# Patient Record
Sex: MALE | Race: BLACK OR AFRICAN AMERICAN | NOT HISPANIC OR LATINO | Employment: UNEMPLOYED | URBAN - METROPOLITAN AREA
[De-identification: names, ages, dates, MRNs, and addresses within clinical notes are randomized per-mention and may not be internally consistent; named-entity substitution may affect disease eponyms.]

---

## 2021-03-30 ENCOUNTER — OFFICE VISIT (OUTPATIENT)
Dept: FAMILY MEDICINE CLINIC | Facility: CLINIC | Age: 66
End: 2021-03-30

## 2021-03-30 VITALS
TEMPERATURE: 97.7 F | SYSTOLIC BLOOD PRESSURE: 118 MMHG | RESPIRATION RATE: 18 BRPM | DIASTOLIC BLOOD PRESSURE: 80 MMHG | HEART RATE: 89 BPM | OXYGEN SATURATION: 99 % | WEIGHT: 126 LBS

## 2021-03-30 DIAGNOSIS — F41.9 ANXIETY: ICD-10-CM

## 2021-03-30 DIAGNOSIS — K29.50 CHRONIC GASTRITIS, PRESENCE OF BLEEDING UNSPECIFIED, UNSPECIFIED GASTRITIS TYPE: ICD-10-CM

## 2021-03-30 DIAGNOSIS — Z12.5 PROSTATE CANCER SCREENING: ICD-10-CM

## 2021-03-30 DIAGNOSIS — F11.11 HISTORY OF HEROIN ABUSE (HCC): ICD-10-CM

## 2021-03-30 DIAGNOSIS — E11.49 OTHER DIABETIC NEUROLOGICAL COMPLICATION ASSOCIATED WITH TYPE 2 DIABETES MELLITUS (HCC): ICD-10-CM

## 2021-03-30 DIAGNOSIS — E11.69 TYPE 2 DIABETES MELLITUS WITH OTHER SPECIFIED COMPLICATION, UNSPECIFIED WHETHER LONG TERM INSULIN USE (HCC): Primary | ICD-10-CM

## 2021-03-30 DIAGNOSIS — L60.0 INGROWN TOENAIL OF BOTH FEET: ICD-10-CM

## 2021-03-30 PROCEDURE — 99205 OFFICE O/P NEW HI 60 MIN: CPT | Performed by: NURSE PRACTITIONER

## 2021-03-30 RX ORDER — PRIMIDONE 50 MG/1
50 TABLET ORAL 4 TIMES DAILY
COMMUNITY
End: 2021-03-30

## 2021-03-30 RX ORDER — ESCITALOPRAM OXALATE 10 MG/1
10 TABLET ORAL DAILY
COMMUNITY
End: 2021-03-30

## 2021-03-30 RX ORDER — GABAPENTIN 100 MG/1
300 CAPSULE ORAL 3 TIMES DAILY
Qty: 90 CAPSULE | Refills: 2 | Status: SHIPPED | OUTPATIENT
Start: 2021-03-30 | End: 2021-04-15

## 2021-03-30 RX ORDER — FLASH GLUCOSE SENSOR
1 KIT MISCELLANEOUS CONTINUOUS
Qty: 1 EACH | Refills: 0 | Status: SHIPPED | OUTPATIENT
Start: 2021-03-30 | End: 2022-02-16 | Stop reason: SDUPTHER

## 2021-03-30 RX ORDER — GABAPENTIN 100 MG/1
100 CAPSULE ORAL 3 TIMES DAILY
COMMUNITY
End: 2021-03-30 | Stop reason: SDUPTHER

## 2021-03-30 RX ORDER — ONDANSETRON 4 MG/1
4 TABLET, ORALLY DISINTEGRATING ORAL EVERY 6 HOURS PRN
COMMUNITY

## 2021-03-30 RX ORDER — FLASH GLUCOSE SCANNING READER
1 EACH MISCELLANEOUS CONTINUOUS
Qty: 1 DEVICE | Refills: 0 | Status: SHIPPED | OUTPATIENT
Start: 2021-03-30 | End: 2022-02-16 | Stop reason: SDUPTHER

## 2021-03-30 RX ORDER — PANTOPRAZOLE SODIUM 40 MG/1
40 TABLET, DELAYED RELEASE ORAL DAILY
Qty: 60 TABLET | Refills: 0 | Status: SHIPPED | OUTPATIENT
Start: 2021-03-30 | End: 2021-11-16 | Stop reason: SDUPTHER

## 2021-03-30 NOTE — PATIENT INSTRUCTIONS
How to Stop Smoking   WHAT YOU NEED TO KNOW:   You will improve your health and the health of others around you if you stop smoking  Your risk for heart and lung disease, cancer, stroke, heart attack, and vision problems will also decrease  Your adolescent can help prevent or stop harm to his or her brain or body  This will help him or her become a healthy adult  You can benefit from quitting no matter how long you have smoked  DISCHARGE INSTRUCTIONS:   Prepare to stop smoking:  Nicotine is a highly addictive drug found in cigarettes  Withdrawal symptoms can happen when you stop smoking and make it hard to quit  These include anxiety, depression, irritability, trouble sleeping, and increased appetite  You increase your chances of success if you prepare to quit  · Set a quit date  Jason Rodriguezness a date that is within the next 2 weeks  Do not pick a day that you think may be stressful or busy  Write down the day or [place] it on your calender  · Tell friends and family that you plan to quit  Explain that you may have withdrawal symptoms when you try to quit  Ask them to support you  They may be able to encourage you and help reduce your stress to make it easier for you to quit  · Make a list of your reasons for quitting  Put the list somewhere you will see it every day, such as your refrigerator  You can look at the list when you have a craving  · Remove all tobacco and nicotine products from your home, car, and workplace  Also, remove anything else that will tempt you to smoke, such as lighters, matches, or ashtrays  Clean your car, home, and places at work that smell like smoke  The smell of smoke can trigger a craving  · Identify triggers that make you want to smoke  This may include activities, feelings, or people  Also write down 1 way you can deal with each of your triggers  For example, if you want to smoke as soon as you wake up, plan another activity during this time, such as exercise      · Make a plan for how you will quit  Learn about the tools that can help you quit, such as medicine, counseling, or nicotine replacement therapy  Choose at least 2 options to help you quit  · Help your adolescent make a plan to quit  The plan will be more successful if your adolescent makes his or her own decisions  Do not try to pressure him or her to quit immediately or in a certain way  Be supportive and offer help if needed  Tools to help you stop smoking:   · Counseling  from a trained healthcare provider can provide you with support and skills to quit smoking  The provider will also teach you to manage your withdrawal symptoms and cravings  You may receive counseling from one counselor, in group therapy, or through phone therapy called a quit line  · Nicotine replacement therapy (NRT)  such as nicotine patches, gum, or lozenges may help reduce your nicotine cravings  You may get these without a doctor's order  Do not use e-cigarettes or smokeless tobacco in place of cigarettes or to help you quit  They still contain nicotine  · Prescription medicines  such as nasal sprays or nicotine inhalers may help reduce your withdrawal symptoms  Other medicines may also be used to reduce your urge to smoke  Ask your healthcare provider about these medicines  You may need to start certain medicines 2 weeks before your quit date for them to work well  · Hypnosis  is a practice that helps guide you through thoughts and feelings  Hypnosis may help decrease your cravings and make you more willing to quit  · Acupuncture therapy  uses very thin needles to balance energy channels in the body  This is thought to help decrease cravings and symptoms of nicotine withdrawal     · Support groups  let you talk to others who are trying to quit or have already quit  It may be helpful to speak with others about how they quit  Manage your cravings:   · Avoid situations, people, and places that tempt you to smoke    Go to nonsmoking places, such as libraries or restaurants  Understand what tempts you and try to avoid these things  · Keep your hands busy  Hold things such as a stress ball or pen  · Put candy or toothpicks in your mouth  Keep lollipops, sugarless gum, or toothpicks with you at all times  · Do not have alcohol or caffeine  These drinks may tempt you to smoke  Drink healthy liquids such as water or juice instead  · Reward yourself when you resist your cravings  Rewards will motivate you and help you stay positive  · Do an activity that distracts you from your craving  Examples include cleaning, creating art, or gardening  Prevent weight gain after you quit:  You may gain a few pounds after you quit smoking  It is healthier for you to gain a few pounds than to continue to smoke  The following can help you prevent weight gain:  · Eat healthy foods  These include fruits, vegetables, whole-grain breads, low-fat dairy products, beans, lean meats, and fish  Eat healthy snacks, such as low-fat yogurt, if you get hungry between meals  · Drink water before, during, and between meals  This will make your stomach feel full and help prevent you from overeating  Ask your healthcare provider how much liquid to drink each day and which liquids are best for you  · Be physically active  Activity may help reduce your cravings and reduce stress  Take a walk or do some kind of physical activity every day  Ask your healthcare provider which activities are right for you  For support and more information:   · Smokefree  gov  Phone: 7- 279 - 726-2905  Web Address: www smokefrMetabacus  [address] Information is for End User's use only and may not be sold, redistributed or otherwise used for commercial purposes  All illustrations and images included in CareNotes® are the copyrighted property of [employer]  or [facility] Dedra Zuleta   The above information is an  only   It is not intended as medical advice for individual conditions or treatments  Talk to your doctor, nurse or pharmacist before following any medical regimen to see if it is safe and effective for you

## 2021-03-31 PROBLEM — F11.11 HISTORY OF HEROIN ABUSE (HCC): Status: ACTIVE | Noted: 2021-03-31

## 2021-03-31 PROBLEM — F41.9 ANXIETY: Status: ACTIVE | Noted: 2021-03-31

## 2021-03-31 PROBLEM — Z12.5 PROSTATE CANCER SCREENING: Status: ACTIVE | Noted: 2021-03-31

## 2021-03-31 PROBLEM — L60.0 INGROWN TOENAIL OF BOTH FEET: Status: ACTIVE | Noted: 2021-03-31

## 2021-03-31 PROBLEM — K29.50 CHRONIC GASTRITIS: Status: ACTIVE | Noted: 2021-03-31

## 2021-03-31 PROBLEM — E11.40 DIABETIC NEUROPATHY (HCC): Status: ACTIVE | Noted: 2021-03-31

## 2021-03-31 PROBLEM — E11.69 TYPE 2 DIABETES MELLITUS WITH OTHER SPECIFIED COMPLICATION (HCC): Status: ACTIVE | Noted: 2021-03-31

## 2021-03-31 NOTE — ASSESSMENT & PLAN NOTE
Newly diagnosed in [place] and started on oral meds and insulin  No A1C on file  Lab test sent  Long discussion regarding diet/exercise  Continue same regimen and will reassess based on A1C and daily BS log  Pt requesting Freestyle Angella order

## 2021-03-31 NOTE — ASSESSMENT & PLAN NOTE
Bloating & nausea w/ meals, chronic symptoms  Will treat empirically with PPI for 8 weeks then step down to H2      Pt declines GI referral at this time, will refer if symptoms persist

## 2021-03-31 NOTE — ASSESSMENT & PLAN NOTE
Pt and daughter expressly request referral to podiatrist   They are both hesitant to trim nails given his neuropathy and diabetes

## 2021-03-31 NOTE — PROGRESS NOTES
Assessment/Plan:    Problem List Items Addressed This Visit        Digestive    Chronic gastritis     Bloating & nausea w/ meals, chronic symptoms  Will treat empirically with PPI for 8 weeks then step down to H2  Pt declines GI referral at this time, will refer if symptoms persist          Relevant Medications    pantoprazole (PROTONIX) 40 mg tablet       Endocrine    [facility] mellitus with other specified [facility] ) - Primary     Newly diagnosed in [place] and started on oral meds and insulin  No A1C on file  Lab test sent  Long discussion regarding diet/exercise  Continue same regimen and will reassess based on A1C and daily BS log  Pt requesting Freestyle Angella order  Relevant Medications    metFORMIN (GLUCOPHAGE) 1000 MG tablet    insulin detemir (LEVEMIR) 100 units/mL subcutaneous injection    gabapentin (NEURONTIN) 100 mg capsule    Continuous Blood Gluc  (FreeStyle Angella 14 Day [place]) AYLA    Continuous Blood Gluc Sensor (FreeStyle Angella 14 Day Sensor) MISC    Other Relevant Orders    POCT hemoglobin A1c    CBC and differential    Comprehensive metabolic panel    Hemoglobin A1C    Lipid panel    TSH, 3rd generation with Free T4 reflex    Vitamin D 25 hydroxy    Diabetic neuropathy (HCC)     Uncontrolled  Will increase gabapentin to 300mg TID  Advised to keep in contact with office if symptoms don't improve as relapse to drug use is concern  Will f/u in 4 weeks  Relevant Medications    metFORMIN (GLUCOPHAGE) 1000 MG tablet    insulin detemir (LEVEMIR) 100 units/mL subcutaneous injection    gabapentin (NEURONTIN) 100 mg capsule       Musculoskeletal and Integument    Ingrown toenail of both feet     Pt and daughter expressly request referral to podiatrist   They are both hesitant to trim nails given his neuropathy and diabetes  Relevant Orders    Ambulatory referral to Podiatry       Other    History of heroin abuse ([facility] )     Not on suboxone    Pt stopped primidone  Denies night sweats, diarrhea, chest pain, SOB, headaches, or insomnia  He endorses dysphoria and anxiety  He stopped taking celexa  Declines pharmacotherapy  Advised and agrees to mental health therapy  Referral placed  Relevant Orders    Ambulatory referral to behavioral health therapists    Anxiety     Denies SI/HI  Emotional at times during history/exam   Declines pharmacotherapy, stopped taking celexa  Advised and accepts referral to behavioral health therapy  Will f/u in 4 weeks, advised to call if needed sooner  Relevant Orders    Ambulatory referral to behavioral health therapists    Prostate cancer screening    Relevant Orders    PSA, total and free            Return in about 4 weeks (around 4/27/2021) for Recheck depression, diabetes, acid reflux  I have spent 50 minutes with Patient and family today in which greater than 50% of this time was spent in counseling/coordination of care regarding Diagnostic results, Prognosis, Risks and benefits of tx options, Intructions for management, Patient and family education, Importance of tx compliance, Risk factor reductions, and Impressions as well as reviewing recent notes from specialist visits and relevant test and imaging results  Subjective:     HPI: Claudia Ferrera is a 60 y o  male who  has no past medical history on file  who presented to the office today   To establish care with PCP  He is accompanied by his daughter  Patient was in [place] for 6 weeks for cocaine and heroin withdrawal and rehabilitation  He has returned and living with his daughter  He is committed to taking care of his health and starting a new life free of illicit drug use  He is not on Suboxone  He was discharged with Celexa and primidone but no longer taking these 2 medications  He was also started on diabetic medications as he was newly diagnosed with type 2 diabetes      He discloses that due to his painful peripheral neuropathy he was self medicating with illegal drugs  He also states that anxiety and depression are continue challenges for him but he does not want to take medication for this  He also explains that he has GERD symptoms with bloating and nausea with meals  He was never treated for GERD or gastritis in the past   Daughter also requests a freestyle Angella monitor because it is difficult for the patient to check his blood sugar with the monitor that they have; he does not like to stick his finger with lancets  He is a tobacco smoker, is pre-contemplative and is considering medication but declines at this encounter  He also declines chest CT due to history of smoking and also declines referral to GI for routine colonoscopy  No other health concerns at this time  The following portions of the patient's history were reviewed and updated as appropriate: allergies, current medications, past family history, past medical history, past social history, past surgical history, and problem list     Current Outpatient Medications on File Prior to Visit   Medication Sig Dispense Refill    insulin detemir (LEVEMIR) 100 units/mL subcutaneous injection Inject under the skin      metFORMIN (GLUCOPHAGE) 1000 MG tablet Take 1,000 mg by mouth 2 (two) times a day with meals      ondansetron (ZOFRAN-ODT) 4 mg disintegrating tablet Take 4 mg by mouth every 6 (six) hours as needed for nausea or vomiting       No current facility-administered medications on file prior to visit  Review of Systems   Constitutional: Negative for chills and fever  HENT: Negative for ear pain and sore throat  Eyes: Negative for pain and visual disturbance  Respiratory: Negative for cough and shortness of breath  Cardiovascular: Negative for chest pain and palpitations  Gastrointestinal: Positive for abdominal distention, abdominal pain and nausea  Negative for vomiting  Gerd symptoms     Genitourinary: Negative for dysuria and hematuria  Musculoskeletal: Negative for arthralgias and back pain  Skin: Negative for color change and rash  Neurological: Negative for seizures and syncope  Psychiatric/Behavioral: Positive for agitation and dysphoric mood  Negative for self-injury and suicidal ideas  The patient is nervous/anxious  All other systems reviewed and are negative  Tobacco Cessation Counseling: Tobacco cessation counseling was provided  The patient is sincerely urged to quit consumption of tobacco  He is ready to quit tobacco  Medication options and side effects of medication discussed  Patient refused medication  Objective:    /80 (BP Location: Left arm, Patient Position: Sitting, Cuff Size: Standard)   Pulse 89   Temp 97 7 °F (36 5 °C) (Temporal)   Resp 18   Wt 57 2 kg (126 lb)   SpO2 99%     Physical Exam  Constitutional:       Appearance: Normal appearance  HENT:      Head: Normocephalic  Right Ear: Tympanic membrane, ear canal and external ear normal  There is no impacted cerumen  Left Ear: Tympanic membrane, ear canal and external ear normal  There is no impacted cerumen  Nose: Nose normal       Mouth/Throat:      Mouth: Mucous membranes are moist    Eyes:      Extraocular Movements: Extraocular movements intact  Pupils: Pupils are equal, round, and reactive to light  Neck:      Musculoskeletal: Normal range of motion  Cardiovascular:      Rate and Rhythm: Normal rate and regular rhythm  Pulses: Normal pulses  Heart sounds: Normal heart sounds  Pulmonary:      Effort: Pulmonary effort is normal       Breath sounds: Normal breath sounds  Abdominal:      General: Bowel sounds are normal       Palpations: Abdomen is soft  Musculoskeletal: Normal range of motion  General: No tenderness or signs of injury  Right lower leg: No edema  Left lower leg: No edema  Skin:     General: Skin is warm and dry        Capillary Refill: Capillary refill takes less than 2 seconds  Findings: No bruising, lesion or rash  Neurological:      General: No focal deficit present  Mental Status: He is alert and oriented to person, place, and time  Psychiatric:         Attention and Perception: Attention normal          Mood and Affect: Affect is tearful  Speech: Speech normal          Behavior: Behavior normal          Thought Content: Thought content normal          Cognition and Memory: Cognition normal          Judgment: Judgment normal          Kg AYLA Solis  03/31/21  10:43 AM    Patient Instructions     How to Stop Smoking   WHAT YOU NEED TO KNOW:   You will improve your health and the health of others around you if you stop smoking  Your risk for heart and lung disease, cancer, stroke, heart attack, and vision problems will also decrease  Your adolescent can help prevent or stop harm to his or her brain or body  This will help him or her become a healthy adult  You can benefit from quitting no matter how long you have smoked  DISCHARGE INSTRUCTIONS:   Prepare to stop smoking:  Nicotine is a highly addictive drug found in cigarettes  Withdrawal symptoms can happen when you stop smoking and make it hard to quit  These include anxiety, depression, irritability, trouble sleeping, and increased appetite  You increase your chances of success if you prepare to quit  · Set a quit date  Susy Padilla a date that is within the next 2 weeks  Do not pick a day that you think may be stressful or busy  Write down the day or [place] it on your calender  · Tell friends and family that you plan to quit  Explain that you may have withdrawal symptoms when you try to quit  Ask them to support you  They may be able to encourage you and help reduce your stress to make it easier for you to quit  · Make a list of your reasons for quitting  Put the list somewhere you will see it every day, such as your refrigerator   You can look at the list when you have a craving  · Remove all tobacco and nicotine products from your home, car, and workplace  Also, remove anything else that will tempt you to smoke, such as lighters, matches, or ashtrays  Clean your car, home, and places at work that smell like smoke  The smell of smoke can trigger a craving  · Identify triggers that make you want to smoke  This may include activities, feelings, or people  Also write down 1 way you can deal with each of your triggers  For example, if you want to smoke as soon as you wake up, plan another activity during this time, such as exercise  · Make a plan for how you will quit  Learn about the tools that can help you quit, such as medicine, counseling, or nicotine replacement therapy  Choose at least 2 options to help you quit  · Help your adolescent make a plan to quit  The plan will be more successful if your adolescent makes his or her own decisions  Do not try to pressure him or her to quit immediately or in a certain way  Be supportive and offer help if needed  Tools to help you stop smoking:   · Counseling  from a trained healthcare provider can provide you with support and skills to quit smoking  The provider will also teach you to manage your withdrawal symptoms and cravings  You may receive counseling from one counselor, in group therapy, or through phone therapy called a quit line  · Nicotine replacement therapy (NRT)  such as nicotine patches, gum, or lozenges may help reduce your nicotine cravings  You may get these without a doctor's order  Do not use e-cigarettes or smokeless tobacco in place of cigarettes or to help you quit  They still contain nicotine  · Prescription medicines  such as nasal sprays or nicotine inhalers may help reduce your withdrawal symptoms  Other medicines may also be used to reduce your urge to smoke  Ask your healthcare provider about these medicines   You may need to start certain medicines 2 weeks before your quit date for them to work well  · Hypnosis  is a practice that helps guide you through thoughts and feelings  Hypnosis may help decrease your cravings and make you more willing to quit  · Acupuncture therapy  uses very thin needles to balance energy channels in the body  This is thought to help decrease cravings and symptoms of nicotine withdrawal     · Support groups  let you talk to others who are trying to quit or have already quit  It may be helpful to speak with others about how they quit  Manage your cravings:   · Avoid situations, people, and places that tempt you to smoke  Go to nonsmoking places, such as libraries or restaurants  Understand what tempts you and try to avoid these things  · Keep your hands busy  Hold things such as a stress ball or pen  · Put candy or toothpicks in your mouth  Keep lollipops, sugarless gum, or toothpicks with you at all times  · Do not have alcohol or caffeine  These drinks may tempt you to smoke  Drink healthy liquids such as water or juice instead  · Reward yourself when you resist your cravings  Rewards will motivate you and help you stay positive  · Do an activity that distracts you from your craving  Examples include cleaning, creating art, or gardening  Prevent weight gain after you quit:  You may gain a few pounds after you quit smoking  It is healthier for you to gain a few pounds than to continue to smoke  The following can help you prevent weight gain:  · Eat healthy foods  These include fruits, vegetables, whole-grain breads, low-fat dairy products, beans, lean meats, and fish  Eat healthy snacks, such as low-fat yogurt, if you get hungry between meals  · Drink water before, during, and between meals  This will make your stomach feel full and help prevent you from overeating  Ask your healthcare provider how much liquid to drink each day and which liquids are best for you  · Be physically active    Activity may help reduce your cravings and reduce stress  Take a walk or do some kind of physical activity every day  Ask your healthcare provider which activities are right for you  For support and more information:   · Smokefree  gov  Phone: 5- 635 - 371-6254  Web Address: perez smokefree  [place] Information is for End User's use only and may not be sold, redistributed or otherwise used for commercial purposes  All illustrations and images included in CareNotes® are the copyrighted property of [employer]  or [address] [place], [place] [address]seng Zuleta   The above information is an  only  It is not intended as medical advice for individual conditions or treatments  Talk to your doctor, nurse or pharmacist before following any medical regimen to see if it is safe and effective for you

## 2021-03-31 NOTE — ASSESSMENT & PLAN NOTE
Not on suboxone  Pt stopped primidone  Denies night sweats, diarrhea, chest pain, SOB, headaches, or insomnia  He endorses dysphoria and anxiety  He stopped taking celexa  Declines pharmacotherapy  Advised and agrees to mental health therapy  Referral placed

## 2021-03-31 NOTE — ASSESSMENT & PLAN NOTE
Uncontrolled  Will increase gabapentin to 300mg TID  Advised to keep in contact with office if symptoms don't improve as relapse to drug use is concern  Will f/u in 4 weeks

## 2021-03-31 NOTE — ASSESSMENT & PLAN NOTE
Denies SI/HI  Emotional at times during history/exam   Declines pharmacotherapy, stopped taking celexa  Advised and accepts referral to behavioral health therapy  Will f/u in 4 weeks, advised to call if needed sooner

## 2021-04-03 ENCOUNTER — APPOINTMENT (OUTPATIENT)
Dept: LAB | Facility: HOSPITAL | Age: 66
End: 2021-04-03
Payer: COMMERCIAL

## 2021-04-03 DIAGNOSIS — E11.69 TYPE 2 DIABETES MELLITUS WITH OTHER SPECIFIED COMPLICATION, UNSPECIFIED WHETHER LONG TERM INSULIN USE (HCC): ICD-10-CM

## 2021-04-03 DIAGNOSIS — Z12.5 PROSTATE CANCER SCREENING: ICD-10-CM

## 2021-04-03 LAB
25(OH)D3 SERPL-MCNC: 22.9 NG/ML (ref 30–100)
ALBUMIN SERPL BCP-MCNC: 3.8 G/DL (ref 3.5–5)
ALP SERPL-CCNC: 85 U/L (ref 46–116)
ALT SERPL W P-5'-P-CCNC: 33 U/L (ref 12–78)
ANION GAP SERPL CALCULATED.3IONS-SCNC: 9 MMOL/L (ref 4–13)
AST SERPL W P-5'-P-CCNC: 12 U/L (ref 5–45)
BASOPHILS # BLD AUTO: 0.02 THOUSANDS/ΜL (ref 0–0.1)
BASOPHILS NFR BLD AUTO: 0 % (ref 0–1)
BILIRUB SERPL-MCNC: 0.42 MG/DL (ref 0.2–1)
BUN SERPL-MCNC: 12 MG/DL (ref 5–25)
CALCIUM SERPL-MCNC: 9.1 MG/DL (ref 8.3–10.1)
CHLORIDE SERPL-SCNC: 103 MMOL/L (ref 100–108)
CHOLEST SERPL-MCNC: 207 MG/DL (ref 50–200)
CO2 SERPL-SCNC: 28 MMOL/L (ref 21–32)
CREAT SERPL-MCNC: 0.76 MG/DL (ref 0.6–1.3)
EOSINOPHIL # BLD AUTO: 0.15 THOUSAND/ΜL (ref 0–0.61)
EOSINOPHIL NFR BLD AUTO: 2 % (ref 0–6)
ERYTHROCYTE [DISTWIDTH] IN BLOOD BY AUTOMATED COUNT: 12.7 % (ref 11.6–15.1)
EST. AVERAGE GLUCOSE BLD GHB EST-MCNC: 200 MG/DL
GFR SERPL CREATININE-BSD FRML MDRD: 111 ML/MIN/1.73SQ M
GLUCOSE P FAST SERPL-MCNC: 189 MG/DL (ref 65–99)
HBA1C MFR BLD: 8.6 %
HCT VFR BLD AUTO: 41.6 % (ref 36.5–49.3)
HDLC SERPL-MCNC: 62 MG/DL
HGB BLD-MCNC: 14.1 G/DL (ref 12–17)
IMM GRANULOCYTES # BLD AUTO: 0.03 THOUSAND/UL (ref 0–0.2)
IMM GRANULOCYTES NFR BLD AUTO: 0 % (ref 0–2)
LDLC SERPL CALC-MCNC: 124 MG/DL (ref 0–100)
LYMPHOCYTES # BLD AUTO: 1.48 THOUSANDS/ΜL (ref 0.6–4.47)
LYMPHOCYTES NFR BLD AUTO: 20 % (ref 14–44)
MCH RBC QN AUTO: 30 PG (ref 26.8–34.3)
MCHC RBC AUTO-ENTMCNC: 33.9 G/DL (ref 31.4–37.4)
MCV RBC AUTO: 89 FL (ref 82–98)
MONOCYTES # BLD AUTO: 0.49 THOUSAND/ΜL (ref 0.17–1.22)
MONOCYTES NFR BLD AUTO: 7 % (ref 4–12)
NEUTROPHILS # BLD AUTO: 5.21 THOUSANDS/ΜL (ref 1.85–7.62)
NEUTS SEG NFR BLD AUTO: 71 % (ref 43–75)
NONHDLC SERPL-MCNC: 145 MG/DL
NRBC BLD AUTO-RTO: 0 /100 WBCS
PLATELET # BLD AUTO: 172 THOUSANDS/UL (ref 149–390)
PMV BLD AUTO: 11.4 FL (ref 8.9–12.7)
POTASSIUM SERPL-SCNC: 3.5 MMOL/L (ref 3.5–5.3)
PROT SERPL-MCNC: 6.9 G/DL (ref 6.4–8.2)
RBC # BLD AUTO: 4.7 MILLION/UL (ref 3.88–5.62)
SODIUM SERPL-SCNC: 140 MMOL/L (ref 136–145)
TRIGL SERPL-MCNC: 106 MG/DL
TSH SERPL DL<=0.05 MIU/L-ACNC: 0.47 UIU/ML (ref 0.36–3.74)
WBC # BLD AUTO: 7.38 THOUSAND/UL (ref 4.31–10.16)

## 2021-04-03 PROCEDURE — 83036 HEMOGLOBIN GLYCOSYLATED A1C: CPT

## 2021-04-03 PROCEDURE — 82306 VITAMIN D 25 HYDROXY: CPT

## 2021-04-03 PROCEDURE — 80061 LIPID PANEL: CPT

## 2021-04-03 PROCEDURE — 3052F HG A1C>EQUAL 8.0%<EQUAL 9.0%: CPT | Performed by: FAMILY MEDICINE

## 2021-04-03 PROCEDURE — 80053 COMPREHEN METABOLIC PANEL: CPT

## 2021-04-03 PROCEDURE — 84153 ASSAY OF PSA TOTAL: CPT

## 2021-04-03 PROCEDURE — 84154 ASSAY OF PSA FREE: CPT

## 2021-04-03 PROCEDURE — 84443 ASSAY THYROID STIM HORMONE: CPT

## 2021-04-03 PROCEDURE — 36415 COLL VENOUS BLD VENIPUNCTURE: CPT

## 2021-04-03 PROCEDURE — 85025 COMPLETE CBC W/AUTO DIFF WBC: CPT

## 2021-04-05 ENCOUNTER — OFFICE VISIT (OUTPATIENT)
Dept: FAMILY MEDICINE CLINIC | Facility: CLINIC | Age: 66
End: 2021-04-05

## 2021-04-05 VITALS
OXYGEN SATURATION: 97 % | TEMPERATURE: 97.6 F | HEART RATE: 95 BPM | DIASTOLIC BLOOD PRESSURE: 86 MMHG | SYSTOLIC BLOOD PRESSURE: 136 MMHG | BODY MASS INDEX: 25.72 KG/M2 | HEIGHT: 60 IN | WEIGHT: 131 LBS | RESPIRATION RATE: 18 BRPM

## 2021-04-05 DIAGNOSIS — B35.1 ONYCHOMYCOSIS: ICD-10-CM

## 2021-04-05 DIAGNOSIS — E11.69 TYPE 2 DIABETES MELLITUS WITH OTHER SPECIFIED COMPLICATION, UNSPECIFIED WHETHER LONG TERM INSULIN USE (HCC): Primary | ICD-10-CM

## 2021-04-05 DIAGNOSIS — E55.9 VITAMIN D DEFICIENCY: Primary | ICD-10-CM

## 2021-04-05 PROCEDURE — 11721 DEBRIDE NAIL 6 OR MORE: CPT | Performed by: PODIATRIST

## 2021-04-05 PROCEDURE — 99212 OFFICE O/P EST SF 10 MIN: CPT | Performed by: PODIATRIST

## 2021-04-05 RX ORDER — MELATONIN
1000 DAILY
Qty: 30 TABLET | Refills: 2 | Status: SHIPPED | OUTPATIENT
Start: 2021-04-05 | End: 2021-07-16

## 2021-04-05 NOTE — PROGRESS NOTES
At [address] [facility], [facility] 60 y o  male MRN: [id number]  Encounter: [id number]      Assessment/Plan        Diagnoses and all orders for this visit:    Type 2 diabetes mellitus with other specified complication, unspecified whether long term insulin use ([facility] [address] )    Onychomycosis  -     Ambulatory referral to Podiatry       Plan:   Patient was seen/examined  All questions and concerns addressed   Nails x10 were sharply trimmed to normal length and thickness with a large nail nipper without incident (CPT 83167)   Educated patient on proper diabetic foot care; checking feet every day, wearing white socks, always wearing diabetic shoes even in house, tight glycemic control, proper low-sugar diet and exercise   RTC in 6 month(s)    Dr Hudson Hackett was present during this entire procedure  History of Present Illness     HPI:  Skylar Slater is a 60 y o  male who presents with elongated toenails  States that their nails are painful, elongated  They have difficulty applying their socks and shoes  The pressure within their shoe gear is painful and they have been unable to cut their nails adequately  Patient reports numbness/tingling  They state their last HbA1c was 8 6%  The patient denies any nausea, vomiting, fever, chills, shortness of breath, or chest pains  Review of Systems   Constitutional: Negative  HENT: Negative  Eyes: Negative  Respiratory: Negative  Cardiovascular: Negative  Gastrointestinal: Negative  Musculoskeletal: Negative   Skin: elongated thickened toenails  Neurological: Negative  Historical Information   No past medical history on file  No past surgical history on file    Social History   Social History     Substance and Sexual Activity   Alcohol Use Not Currently     Social History     Substance and Sexual Activity   Drug Use Never     Social History     Tobacco Use   Smoking Status Current Every Day Smoker   Smokeless Tobacco Never Used     Family History: No family history on file  Meds/Allergies   (Not in a hospital admission)    No Known Allergies    Objective     Current Vitals:   Blood Pressure: 136/86 (04/05/21 0924)  Pulse: 95 (04/05/21 0924)  Temperature: 97 6 °F (36 4 °C) (04/05/21 0924)  Respirations: 18 (04/05/21 0924)  Height: 4' 11" (149 9 cm) (04/05/21 0924)  Weight - Scale: 59 4 kg (131 lb) (04/05/21 0924)  SpO2: 97 % (04/05/21 0924)        /86 (BP Location: Left arm, Patient Position: Sitting, Cuff Size: Standard)   Pulse 95   Temp 97 6 °F (36 4 °C)   Resp 18   Ht 4' 11" (1 499 m)   Wt 59 4 kg (131 lb)   SpO2 97%   BMI 26 46 kg/m²       Lower Extremity Exam:    Vascular: Right foot DP/PT +2                   Left foot DP/PT +2                   There is no lower extremity edema bilateral     Musculoskeletal: There is 5/5 strength throughout the bilateral lower extremity  full ankle range of motion with well maintained subtalar range of motion  There is no tenderness over the foot and ankle  There is not foot deformities    Neurological: Sensation to 5 07 Mulberry-Josef nylon filament: positive bilaterally      Vibratory sense to distal Foot  positive bilaterally      Sharp/Dull sense is positive bilaterally      Dermatology: Skin Condition:  normal     There is not evidence of macerated tissue between toe spaces  Nail Exam: onychomycosis of the toenails       Open ulcerations: No     Calluses: No    Risk Category: 0 = No loss of protective sensation

## 2021-04-06 DIAGNOSIS — E11.69 TYPE 2 DIABETES MELLITUS WITH OTHER SPECIFIED COMPLICATION, UNSPECIFIED WHETHER LONG TERM INSULIN USE (HCC): Primary | ICD-10-CM

## 2021-04-06 LAB
PSA FREE MFR SERPL: 37.1 %
PSA FREE SERPL-MCNC: 0.26 NG/ML
PSA SERPL-MCNC: 0.7 NG/ML (ref 0–4)

## 2021-04-15 ENCOUNTER — OFFICE VISIT (OUTPATIENT)
Dept: FAMILY MEDICINE CLINIC | Facility: CLINIC | Age: 66
End: 2021-04-15

## 2021-04-15 VITALS
RESPIRATION RATE: 18 BRPM | TEMPERATURE: 98 F | SYSTOLIC BLOOD PRESSURE: 138 MMHG | OXYGEN SATURATION: 98 % | DIASTOLIC BLOOD PRESSURE: 86 MMHG | WEIGHT: 128.3 LBS | HEIGHT: 60 IN | BODY MASS INDEX: 25.19 KG/M2 | HEART RATE: 96 BPM

## 2021-04-15 DIAGNOSIS — E11.69 TYPE 2 DIABETES MELLITUS WITH OTHER SPECIFIED COMPLICATION, UNSPECIFIED WHETHER LONG TERM INSULIN USE (HCC): Primary | ICD-10-CM

## 2021-04-15 DIAGNOSIS — E11.49 OTHER DIABETIC NEUROLOGICAL COMPLICATION ASSOCIATED WITH TYPE 2 DIABETES MELLITUS (HCC): ICD-10-CM

## 2021-04-15 PROCEDURE — 99213 OFFICE O/P EST LOW 20 MIN: CPT | Performed by: FAMILY MEDICINE

## 2021-04-15 PROCEDURE — 4004F PT TOBACCO SCREEN RCVD TLK: CPT | Performed by: FAMILY MEDICINE

## 2021-04-15 PROCEDURE — 3008F BODY MASS INDEX DOCD: CPT | Performed by: FAMILY MEDICINE

## 2021-04-15 RX ORDER — GABAPENTIN 300 MG/1
600 CAPSULE ORAL 3 TIMES DAILY
Qty: 180 CAPSULE | Refills: 2 | Status: SHIPPED | OUTPATIENT
Start: 2021-04-15 | End: 2021-07-19

## 2021-04-16 ENCOUNTER — SOCIAL WORK (OUTPATIENT)
Dept: BEHAVIORAL/MENTAL HEALTH CLINIC | Facility: CLINIC | Age: 66
End: 2021-04-16

## 2021-04-16 DIAGNOSIS — F32.1 CURRENT MODERATE EPISODE OF MAJOR DEPRESSIVE DISORDER WITHOUT PRIOR EPISODE (HCC): Primary | ICD-10-CM

## 2021-04-16 NOTE — PROGRESS NOTES
A/P:  1  Diabetic peripheral neuropathy: Increase gabapentin to 600 mg po TID  If no relief, consider TCA versus duloxetine versus venlafaxine  F/U with Felt Blue, CRNP in 4-6 weeks  HPI:  60year old man diagnosed with diabetic polyneuropathy in b/l legs  Started on gabapentin 300 mg po TID  States that the gabapentin helps the pain, but his symptoms return after a few hours  In particular, the symptoms keep him awake at night  Last A1C 8 6% (goal <7%)  On metformin, determir      ROS per HPI      Current Outpatient Medications:     cholecalciferol (VITAMIN D3) 1,000 units tablet, Take 1 tablet (1,000 Units total) by mouth daily, Disp: 30 tablet, Rfl: 2    gabapentin (NEURONTIN) 300 mg capsule, Take 2 capsules (600 mg total) by mouth 3 (three) times a day, Disp: 180 capsule, Rfl: 2    insulin detemir (LEVEMIR) 100 units/mL subcutaneous injection, Inject under the skin, Disp: , Rfl:     metFORMIN (GLUCOPHAGE) 1000 MG tablet, Take 1 tablet (1,000 mg total) by mouth 2 (two) times a day with meals, Disp: 180 tablet, Rfl: 0    ondansetron (ZOFRAN-ODT) 4 mg disintegrating tablet, Take 4 mg by mouth every 6 (six) hours as needed for nausea or vomiting, Disp: , Rfl:     pantoprazole (PROTONIX) 40 mg tablet, Take 1 tablet (40 mg total) by mouth daily, Disp: 60 tablet, Rfl: 0    Continuous Blood Gluc  (FreeStyle Angella 14 Day Nowata) AYLA, Use 1 Device continuous (Patient not taking: Reported on 4/15/2021), Disp: 1 Device, Rfl: 0    Continuous Blood Gluc Sensor (FreeStyle Angella 14 Day Sensor) MISC, Use 1 Device continuous (Patient not taking: Reported on 4/15/2021), Disp: 1 each, Rfl: 0    Vitals:    04/15/21 1130   BP: 138/86   Pulse: 96   Resp: 18   Temp: 98 °F (36 7 °C)   SpO2: 98%     GNRL: WN, WD male in mild distress  EXT: Decreased sensation b/l LE, 1+ b/l pulses, no obvious wounds or deformities of feet
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
## 2021-04-16 NOTE — BH TREATMENT PLAN
[address] [facility], [facility]  1955  Date of Treatment Plan:4/16/21    Treatment Goals (after each item selected, indicate outcome measures; (ie: as evidenced by)    [x] Reduce Risk Factors of: relapse   [x] Reduce Major Symptoms of: Depression and addiction   [x] Ameliorate Functional Impairments of:    [x] Develop Coping Strategies to Address Stress of: past and present addictive behaviors   [] Stabilize (short term) Crisis of:   [x] Maintain (long term) Stabilization of Symptoms of: addiction and depression   [] Medication Referral to:   [x] Maintain Sobriety: from drugs and alcohol     Planned Interventions- Patient Participation (must be consistent with treatment goals):    [] Assertiveness Training [x] Problem Solving Skills Training   [] Anger Management [x] Solution Focused Techniques   [] Affect Identification and Expression [x] Stress Management   [] Cognitive Restructuring [] Supportive Therapy   [] Communication Training  [] Self/Other Calloway Training    [x] Grief Work  [x] Decision Options Exploration   [] Imagery/Relaxation Training [] Decision Option Exploration   [] Parent Training  [x] Pattern Identification and Interruption       [x] Engage Significant Others in Treatment: to involve [facility] dtr Leesa Linares    [x] Facilitate Decision Making Regarding:long term abstinance    [] Explore/Monitor:    [] Teach Skills of:    [] Educate Regarding:   [] Assign Readings:   [] Referrals Planned:   [x] Use of Resources/Strengths:community resources and past work experience   [x] Preventative Strategies: relapse prevention   [x] Obstacles to Change: psychosocial stressors, lack of motivation and negative environment etc     Estimated Time Frames:     Goal 1: Develop Coping Strategies to Address Stress of: past and present addictive behaviors   Goal 2: Maintain (long term) Stabilization of Symptoms of: addiction and depression    I have been provided education on my primary diagnosis of: 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

## 2021-04-19 ENCOUNTER — TELEPHONE (OUTPATIENT)
Dept: FAMILY MEDICINE CLINIC | Facility: CLINIC | Age: 66
End: 2021-04-19

## 2021-04-19 NOTE — PATIENT INSTRUCTIONS
Help Prevent Suicide   WHAT YOU NEED TO KNOW:   A person may see suicide as the only way to escape emotional or physical pain and suffering  You can help provide emotional support for him or her and get the help he or she needs  Learn to recognize warning signs that the person may be considering suicide  Resources are available to help you and the person  DISCHARGE INSTRUCTIONS:   Call the person's local emergency number (911 in the [address] [place] [address],[address]) if:   · The person has done something on purpose to hurt himself or herself  · The person tries to commit suicide  · The person tells you he or she made a plan to commit suicide  Call the person's doctor or therapist if:   · The person acts out in anger, is reckless, or is abusing alcohol or drugs  · The person has serious thoughts of suicide, even after treatment  · You begin to see warning signs that the person may be considering suicide  · The person has intense feelings of sadness, anger, revenge, or despair  · The person withdraws from others  · The person tells you he or she has more thoughts of suicide when alone  · The person stops eating, or begins to smoke or drink heavily  · The person says he or she is a burden because of a disability or disease  · You have questions or concerns about the person's condition or care  What to do if the person is having thoughts of suicide:  Call the person's local emergency number [phone] in the [address] [place] [address],[address]) if you feel he or she is at immediate risk of suicide  Also call if he or she talks about an active suicide plan  Assume that the person intends to carry out his or her plan  The following are some things you can do:  · Contact a suicide prevention organization:      ? National Suicide Prevention Lifeline: 2-708.258.5007 (3-614-615-CNTK)     ? Suicide Hotline: 2-824.197.7585 (7-568-PSOWYIM)     ?  For a list of international numbers: https://save org/find-help/international-resources/    · Contact the person's therapist  His or her healthcare provider can give you a list of therapists if he or she does not have one  · Keep medicines, weapons, and alcohol out of the person's reach  · Do not leave the person alone if he or she says he or she wants to commit suicide  Do not leave the person alone if you think he or she may try it  Make sure you do not put yourself at risk if the person has a weapon  · Ask the person if he or she is thinking of committing suicide and has a plan  Warning signs to watch for:   · Talking about a plan for committing suicide, or suddenly deciding to make a will    · Cutting himself or herself, burning the skin with cigarettes, or driving recklessly    · Drug or alcohol use, not taking prescribed medicine, or taking too much prescribed medicine    · Sudden anger, lashing out at others, or seeming hopeless, anxious, or angry and then suddenly becoming happy or peaceful    · Not wanting to spend time with others or doing things he or she usually enjoys    · Trouble at work, or not showing up for work    · A change in the way he or she eats, sleeps, or dresses    · Weight gain or loss or having less energy than usual    · Trouble sleeping or spending a lot of time sleeping    · Giving away or throwing away his or her belongings    · Suddenly not going to therapy    Follow up with the person's doctor or therapist as directed:  Write down your questions so you remember to ask them during your visits  Treatments the person may need:   · Medicines  may be given to prevent mood swings, or to decrease anxiety or depression  The person will need to take all medicines as directed  A sudden stop can be harmful  It may take 4 to 6 weeks for the medicine to help him or her feel better  · A therapist  can help the person identify and change negative feelings or beliefs about himself or herself  This may also help change the way the he or she feels and acts   A therapist can also help the person find ways to cope with things that cannot be changed  What you can do to help the person:   · Encourage the person to seek help for drug or alcohol abuse  Drugs and alcohol can increase suicidal thoughts and make the person more likely to act on them  · Help the person connect with others  Encourage him or her to become involved in the community  Some examples include tutoring a young student, volunteering at a [employer], or joining a group exercise program     · Exercise with the person  Exercise can lift his or her mood, increase energy, and make it easier to sleep at night  · Encourage the person to try new things  Adults who are open to new experiences handle stress and change better than those who are not  · Call, visit, or send postcards to the person often  Check on him or her after the loss of a pet, longtime friend, or child  Holidays, birthdays, and anniversaries can be difficult for a person after a loss  The loss of a spouse can be especially painful and lonely  · Help the person schedule a visit with his or her Scientologist or spiritual leader  A Scientologist or spiritual leader may be able to offer additional support and resources to the person  · Encourage the person to continue taking medicine and going to therapy  Medicine and therapy can help improve his or her mental health  For support and more information:   · [address] [place] [address]  Phone: 2- 725 - 709-TBFF (01 33 [phone] 04 02)  Web Address: [employer]    · Suicide Awareness Voices of Education  [address] [place] [address] Tony Gurrola [address] , [address]  Phone: 3- 666 - 560-2344  Web Address: [employer] [facility] [place] [address] Information is for End User's use only and may not be sold, redistributed or otherwise used for commercial purposes   All illustrations and images included in CareNotes® are the copyrighted property of [employer]  or [address]  The above information is an  only  It is not intended as medical advice for individual conditions or treatments  Talk to your doctor, nurse or pharmacist before following any medical regimen to see if it is safe and effective for you

## 2021-04-19 NOTE — TELEPHONE ENCOUNTER
----- Message from Barbara Greenberg sent at 4/15/2021 12:30 PM EDT -----  Regarding: Laurie Gordon, when scheduling patients for Ray  If the patient is seeing Oza Klinefelter for the 1st time be sure to schedule the patient as a new patient appt  This will come to me in my workq as a new patient and will trigger me to set up the B/H guarantor on the patients acct  Thanks

## 2021-04-21 ENCOUNTER — TELEPHONE (OUTPATIENT)
Dept: PSYCHIATRY | Facility: CLINIC | Age: 66
End: 2021-04-21

## 2021-05-05 ENCOUNTER — IMMUNIZATIONS (OUTPATIENT)
Dept: FAMILY MEDICINE CLINIC | Facility: HOSPITAL | Age: 66
End: 2021-05-05

## 2021-05-05 DIAGNOSIS — Z23 ENCOUNTER FOR IMMUNIZATION: Primary | ICD-10-CM

## 2021-05-05 PROCEDURE — 91301 SARS-COV-2 / COVID-19 MRNA VACCINE (MODERNA) 100 MCG: CPT

## 2021-05-05 PROCEDURE — 0011A SARS-COV-2 / COVID-19 MRNA VACCINE (MODERNA) 100 MCG: CPT

## 2021-05-07 ENCOUNTER — TELEPHONE (OUTPATIENT)
Dept: FAMILY MEDICINE CLINIC | Facility: CLINIC | Age: 66
End: 2021-05-07

## 2021-05-07 NOTE — TELEPHONE ENCOUNTER
This therapist contacted Esthela Santos and confirmed the virtual appt on 5/14 at 11am  His email id is:    [email or web address]  [email or web address]

## 2021-05-14 ENCOUNTER — TELEMEDICINE (OUTPATIENT)
Dept: BEHAVIORAL/MENTAL HEALTH CLINIC | Facility: CLINIC | Age: 66
End: 2021-05-14

## 2021-05-14 DIAGNOSIS — F41.1 GENERALIZED ANXIETY DISORDER: Primary | ICD-10-CM

## 2021-05-14 PROCEDURE — 90832 PSYTX W PT 30 MINUTES: CPT | Performed by: SOCIAL WORKER

## 2021-05-14 NOTE — PATIENT INSTRUCTIONS
Generalized Anxiety Disorder   WHAT YOU NEED TO KNOW:   General anxiety disorder (OMID) is a condition that causes you to worry more than normal  It also causes you to feel fear in most situations  You are worried or afraid even without a cause  You may worry about your health, job, money, and relationships  It is hard for you to control your worry and feel calm  OMID prevents you from doing daily activities  It may also prevent you from spending time with family and friends  Without treatment, your anxiety may get worse  DISCHARGE INSTRUCTIONS:   Call your local emergency number (911 in the [address]) for any of the following:   · You have chest pain, tightness, or heaviness that may spread to your shoulders, arms, jaw, neck, or back  · You feel like hurting yourself or someone else  Call your doctor if:   · Your symptoms get worse or do not get better with treatment  · You have new symptoms since your last visit  · You have questions or concerns about your condition or care  Medicines:   · Medicines  help you feel more calm and relaxed, and decrease your symptoms  · Take your medicine as directed  Contact your healthcare provider if you think your medicine is not helping or if you have side effects  Tell him of her if you are allergic to any medicine  Keep a list of the medicines, vitamins, and herbs you take  Include the amounts, and when and why you take them  Bring the list or the pill bottles to follow-up visits  Carry your medicine list with you in case of an emergency  Manage anxiety:   · Talk to someone about your anxiety  Your healthcare provider may suggest counseling  Cognitive behavioral therapy can help you understand and change how you react to events  It can also help you understand what triggers your symptoms  You might feel more comfortable talking with a friend or family member about your anxiety  Choose someone you know will be supportive and encouraging      · Keep a journal of your symptoms  Write down what you were doing before your symptoms started  Also write down what made the anxiety better or worse  Bring this journal with you to your follow-up appointments  · Find ways to relax  Activities such as yoga, meditation, or listening to music can help you relax  Spend time with friends, or do things you enjoy  · Practice deep breathing  Deep breathing can help you relax when you feel anxious  Focus on taking slow, deep breaths several times a day, or during an anxiety attack  Slowly breathe in through your nose  Pause, then slowly breathe out through your mouth  Try to breathe out longer than you breathed in  · Create a regular sleep routine  Regular sleep can help you feel calmer during the day  Go to sleep and wake up at the same times every day  Do not watch television or use the computer right before bed  Your room should be comfortable, dark, and quiet  · Eat a variety of healthy foods  Healthy foods include fruits, vegetables, low-fat dairy products, lean meats, fish, whole-grain breads, and cooked beans  Healthy foods can help you feel less anxious and have more energy  · Exercise regularly  Exercise can increase your energy level  Exercise may also lift your mood and help you sleep better  Your healthcare provider can help you create an exercise plan  · Do not smoke  Nicotine and other chemicals in cigarettes and cigars can increase anxiety  Ask your healthcare provider for information if you currently smoke and need help to quit  E-cigarettes or smokeless tobacco still contain nicotine  Talk to your healthcare provider before you use these products  · Do not have caffeine  Caffeine can make your symptoms worse  Do not have foods or drinks that are meant to increase your energy level  · Limit or do not drink alcohol  Ask your healthcare provider if alcohol is safe for you  Also ask how much is safe   You may not be able to drink alcohol if you take certain anxiety or depression medicines  · Do not use drugs  Drugs can make your anxiety worse  It can also make anxiety hard to manage  Talk to your healthcare provider if you use drugs and want help to quit  Follow up with your healthcare provider as directed:  Write down your questions so you remember to ask them during your visits  © Copyright [address] Information is for End User's use only and may not be sold, redistributed or otherwise used for commercial purposes  All illustrations and images included in CareNotes® are the copyrighted property of A D A M , Inc  or [facility] Dedra Zuleta   The above information is an  only  It is not intended as medical advice for individual conditions or treatments  Talk to your doctor, nurse or pharmacist before following any medical regimen to see if it is safe and effective for you

## 2021-05-14 NOTE — PSYCH
Assessment/Plan: Jeff Mayo presented in the session with an anxious mood and affect  He was able to ventilate his feelings appropriately  He was also able to pin point the reasons for his anxiety  He denied any depressive thoughts or feelings currently  There are no diagnoses linked to this encounter  Subjective: I have been feeling anxious and nervous lately  I am going for camping with my N/A friends  I am anxious and nervous because I may forget and leave stuff behind  I feel overwhelmed now and feel nervous constantly  I take gabapentin and the vitamins  I take the metformin for diabetes  My anxiety is the worse now  Patient ID: Jaden Ocasio is a 60 y o  male  HPI11:05 to 11:40- 35 min    Review of Systems      Objective: Jeff Mayo presented in the session with anxiety  He was having difficulty logging in  He seemed to be nervous and was feeling on the edge  He was unable to relax and wanted to cut short the visit  He was unable to acknowledge the importance of deep breathing  He was unable to practice deep breathing  He seemed to look for a quick solution or a pill to manage the anxiety         Physical Exam

## 2021-05-14 NOTE — BH TREATMENT PLAN
[address] [facility]  1955  Date of Treatment Plan:4/16/21    Treatment Goals (after each item selected, indicate outcome measures; (ie: as evidenced by)    [x] Reduce Risk Factors of: relapse   [x] Reduce Major Symptoms of: Depression and anxiety   [x] Ameliorate Functional Impairments of: lack of interest and lack of motivation   [x] Develop Coping Strategies to Address Stress of: life stressors   [x] Stabilize (short term) Crisis of: going out in the crowd away from the comfort zone   [x] Maintain (long term) Stabilization of Symptoms of: addiction and depression   [] Medication Referral to:   [x] Maintain Sobriety: from drugs and alcohol     Planned Interventions- Patient Participation (must be consistent with treatment goals):    [] Assertiveness Training [x] Problem Solving Skills Training   [] Anger Management [x] Solution Focused Techniques   [] Affect Identification and Expression [x] Stress Management   [] Cognitive Restructuring [x] Supportive Therapy   [] Communication Training  [x] Self/Other Power Training    [x] Grief Work  [x] Decision Options Exploration   [x] Imagery/Relaxation Training [] Decision Option Exploration   [] Parent Training  [x] Pattern Identification and Interruption       [x] Engage Significant Others in Treatment: to involve Sherwin's dtr Antoinette Torres    [x] Facilitate Decision Making Regarding:long term abstinance    [] Explore/Monitor:    [x] Teach Skills of: relaxation   [] Educate Regarding:   [] Assign Readings:   [] Referrals Planned:   [x] Use of Resources/Strengths:community resources and past work experience   [x] Preventative Strategies: relapse prevention   [x] Obstacles to Change: psychosocial stressors, lack of motivation and negative environment etc     Estimated Time Frames: 7/16/2021     Goal 1: Develop Coping Strategies to Address Stress of: past and present addictive behaviors   Goal 2: Maintain (long term) Stabilization of Symptoms of: addiction and depression    I have been provided education on my primary diagnosis of: 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

## 2021-05-21 ENCOUNTER — TELEMEDICINE (OUTPATIENT)
Dept: BEHAVIORAL/MENTAL HEALTH CLINIC | Facility: CLINIC | Age: 66
End: 2021-05-21

## 2021-05-21 DIAGNOSIS — F41.1 GENERALIZED ANXIETY DISORDER: Primary | ICD-10-CM

## 2021-05-21 PROCEDURE — 90834 PSYTX W PT 45 MINUTES: CPT | Performed by: SOCIAL WORKER

## 2021-05-21 NOTE — PSYCH
Assessment/Plan: Santosh Hudson presented in the session with an anxious mood and irritable affect          There are no diagnoses linked to this encounter  Subjective: I am in a situation and need help  I feel I don't have the control over my situation  My 10 yr old grand dtr has been disrespectful  She doesn't respects her brother who is 15  Her mom has lots leniency for her  I told my dtr that I cannot deal with the disrespectful behavior  I don't know what to do  My energy is little low  I had issue with weight loss  My normal weight was 135  Now my weight is 127  I am more anxious than depressed  I feel irritated and angry with my situation  I am anxious especially when I think about something that is coming up like my appointment  I was very much anxious and nervous before my camping trip  Patient ID: Deidra Holt is a 60 y o  male  HPI- 2:16 to 3:05- 51 min     Review of Systems      Objective:Sherwin was having difficulty joining the call today at 1pm  Therapist tried to help him but Santosh Hudson couldn't join  So he was asked to join later after 2:15  Santosh Hudson seems to have anxiety prior to an appointment or activity  He was helped to learn the problem solving technique and the relaxation skill  He was helped to learn the deep breathing exercise  He was encouraged to practice it 15 to 20 min daily  Santosh Hudson seems to have the attention/concentration problems  He acknowledged the need for the breathing exercise        Physical Exam

## 2021-05-21 NOTE — PATIENT INSTRUCTIONS
Generalized Anxiety Disorder   WHAT YOU NEED TO KNOW:   General anxiety disorder (OMID) is a condition that causes you to worry more than normal  It also causes you to feel fear in most situations  You are worried or afraid even without a cause  You may worry about your health, job, money, and relationships  It is hard for you to control your worry and feel calm  OMID prevents you from doing daily activities  It may also prevent you from spending time with family and friends  Without treatment, your anxiety may get worse  DISCHARGE INSTRUCTIONS:   Call your local emergency number (911 in the [address]) for any of the following:   · You have chest pain, tightness, or heaviness that may spread to your shoulders, arms, jaw, neck, or back  · You feel like hurting yourself or someone else  Call your doctor if:   · Your symptoms get worse or do not get better with treatment  · You have new symptoms since your last visit  · You have questions or concerns about your condition or care  Medicines:   · Medicines  help you feel more calm and relaxed, and decrease your symptoms  · Take your medicine as directed  Contact your healthcare provider if you think your medicine is not helping or if you have side effects  Tell him of her if you are allergic to any medicine  Keep a list of the medicines, vitamins, and herbs you take  Include the amounts, and when and why you take them  Bring the list or the pill bottles to follow-up visits  Carry your medicine list with you in case of an emergency  Manage anxiety:   · Talk to someone about your anxiety  Your healthcare provider may suggest counseling  Cognitive behavioral therapy can help you understand and change how you react to events  It can also help you understand what triggers your symptoms  You might feel more comfortable talking with a friend or family member about your anxiety  Choose someone you know will be supportive and encouraging      · Keep a journal of your symptoms  Write down what you were doing before your symptoms started  Also write down what made the anxiety better or worse  Bring this journal with you to your follow-up appointments  · Find ways to relax  Activities such as yoga, meditation, or listening to music can help you relax  Spend time with friends, or do things you enjoy  · Practice deep breathing  Deep breathing can help you relax when you feel anxious  Focus on taking slow, deep breaths several times a day, or during an anxiety attack  Slowly breathe in through your nose  Pause, then slowly breathe out through your mouth  Try to breathe out longer than you breathed in  · Create a regular sleep routine  Regular sleep can help you feel calmer during the day  Go to sleep and wake up at the same times every day  Do not watch television or use the computer right before bed  Your room should be comfortable, dark, and quiet  · Eat a variety of healthy foods  Healthy foods include fruits, vegetables, low-fat dairy products, lean meats, fish, whole-grain breads, and cooked beans  Healthy foods can help you feel less anxious and have more energy  · Exercise regularly  Exercise can increase your energy level  Exercise may also lift your mood and help you sleep better  Your healthcare provider can help you create an exercise plan  · Do not smoke  Nicotine and other chemicals in cigarettes and cigars can increase anxiety  Ask your healthcare provider for information if you currently smoke and need help to quit  E-cigarettes or smokeless tobacco still contain nicotine  Talk to your healthcare provider before you use these products  · Do not have caffeine  Caffeine can make your symptoms worse  Do not have foods or drinks that are meant to increase your energy level  · Limit or do not drink alcohol  Ask your healthcare provider if alcohol is safe for you  Also ask how much is safe   You may not be able to drink alcohol if you take certain anxiety or depression medicines  · Do not use drugs  Drugs can make your anxiety worse  It can also make anxiety hard to manage  Talk to your healthcare provider if you use drugs and want help to quit  Follow up with your healthcare provider as directed:  Write down your questions so you remember to ask them during your visits  © Copyright [address] Information is for End User's use only and may not be sold, redistributed or otherwise used for commercial purposes  All illustrations and images included in CareNotes® are the copyrighted property of A D A M , Inc  or [facility] Dedra Zuleta   The above information is an  only  It is not intended as medical advice for individual conditions or treatments  Talk to your doctor, nurse or pharmacist before following any medical regimen to see if it is safe and effective for you

## 2021-05-21 NOTE — BH TREATMENT PLAN
[address] [facility]  1955  Date of Treatment Plan:4/16/21    Treatment Goals (after each item selected, indicate outcome measures; (ie: as evidenced by)    [x] Reduce Risk Factors of: relapse on opiates   [x] Reduce Major Symptoms of: Depression and anxiety   [x] Ameliorate Functional Impairments of: lack of interest and lack of motivation   [x] Develop Coping Strategies to Address Stress of: psychosocial stressors    [x] Stabilize (short term) Crisis of: going out in the crowd away from the comfort zone   [x] Maintain (long term) Stabilization of Symptoms of: addiction, anxiety and depression   [] Medication Referral to:   [x] Maintain Sobriety: from psychoactive substances     Planned Interventions- Patient Participation (must be consistent with treatment goals):    [x] Assertiveness Training [x] Problem Solving Skills Training   [] Anger Management [x] Solution Focused Techniques   [] Affect Identification and Expression [x] Stress Management   [] Cognitive Restructuring [x] Supportive Therapy   [] Communication Training  [x] Self/Other [place] Training    [x] Grief Work  [x] Decision Options Exploration   [x] Imagery/Relaxation Training [] Decision Option Exploration   [] Parent Training  [x] Pattern Identification and Interruption       [x] Engage Significant Others in Treatment: to involve Sherwin's dtr Amanda Aguirre    [x] Facilitate Decision Making Regarding:family issues    [x] Explore/Monitor: anxiety, depression and addictive thinking patterns   [x] Teach Skills of: relaxation, problem solving   [] Educate Regarding:   [] Assign Readings:   [] Referrals Planned:   [x] Use of Resources/Strengths:community resources and past work experience   [x] Preventative Strategies: positive coping skills, positive distractions   [x] Obstacles to Change: psychosocial stressors, lack of motivation and negative environment etc     Estimated Time Frames: 7/16/2021     Goal 1: Develop Coping Strategies to Address Stress of: past and present addictive behaviors   Goal 2: Maintain (long term) Stabilization of Symptoms of: addiction and depression    I have been provided education on my primary diagnosis of: 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

## 2021-05-26 ENCOUNTER — OFFICE VISIT (OUTPATIENT)
Dept: FAMILY MEDICINE CLINIC | Facility: CLINIC | Age: 66
End: 2021-05-26

## 2021-05-26 VITALS
RESPIRATION RATE: 20 BRPM | WEIGHT: 130.3 LBS | SYSTOLIC BLOOD PRESSURE: 126 MMHG | OXYGEN SATURATION: 97 % | DIASTOLIC BLOOD PRESSURE: 78 MMHG | BODY MASS INDEX: 25.58 KG/M2 | HEART RATE: 97 BPM | TEMPERATURE: 98.6 F | HEIGHT: 60 IN

## 2021-05-26 DIAGNOSIS — Z79.4 TYPE 2 DIABETES MELLITUS WITH OTHER SPECIFIED COMPLICATION, WITH LONG-TERM CURRENT USE OF INSULIN (HCC): ICD-10-CM

## 2021-05-26 DIAGNOSIS — M79.622 LEFT UPPER ARM PAIN: ICD-10-CM

## 2021-05-26 DIAGNOSIS — E11.69 TYPE 2 DIABETES MELLITUS WITH OTHER SPECIFIED COMPLICATION, WITH LONG-TERM CURRENT USE OF INSULIN (HCC): ICD-10-CM

## 2021-05-26 DIAGNOSIS — G47.30 SLEEP APNEA, UNSPECIFIED TYPE: ICD-10-CM

## 2021-05-26 DIAGNOSIS — E11.9 ENCOUNTER FOR DIABETIC FOOT EXAM (HCC): ICD-10-CM

## 2021-05-26 DIAGNOSIS — E11.49 OTHER DIABETIC NEUROLOGICAL COMPLICATION ASSOCIATED WITH TYPE 2 DIABETES MELLITUS (HCC): ICD-10-CM

## 2021-05-26 DIAGNOSIS — F11.11 HISTORY OF HEROIN ABUSE (HCC): ICD-10-CM

## 2021-05-26 DIAGNOSIS — J45.909 ASTHMA DUE TO ENVIRONMENTAL ALLERGIES: ICD-10-CM

## 2021-05-26 DIAGNOSIS — Z00.01 ENCOUNTER FOR GENERAL ADULT MEDICAL EXAMINATION WITH ABNORMAL FINDINGS: Primary | ICD-10-CM

## 2021-05-26 PROCEDURE — 4004F PT TOBACCO SCREEN RCVD TLK: CPT | Performed by: NURSE PRACTITIONER

## 2021-05-26 PROCEDURE — 99215 OFFICE O/P EST HI 40 MIN: CPT | Performed by: NURSE PRACTITIONER

## 2021-05-26 PROCEDURE — 3008F BODY MASS INDEX DOCD: CPT | Performed by: NURSE PRACTITIONER

## 2021-05-26 RX ORDER — ASPIRIN 81 MG/1
81 TABLET ORAL DAILY
Qty: 90 TABLET | Refills: 2 | Status: SHIPPED | OUTPATIENT
Start: 2021-05-26 | End: 2022-02-04

## 2021-05-26 RX ORDER — CETIRIZINE HYDROCHLORIDE 10 MG/1
10 TABLET ORAL DAILY
Qty: 30 TABLET | Refills: 2 | Status: SHIPPED | OUTPATIENT
Start: 2021-05-26 | End: 2021-09-09

## 2021-05-26 NOTE — PATIENT INSTRUCTIONS
Remember to have a small snack before bedtime to prevent hypoglycemic event (critically low blood sugar at night)

## 2021-05-26 NOTE — PROGRESS NOTES
Assessment/Plan:    Problem List Items Addressed This Visit        Endocrine    Type 2 diabetes mellitus with other specified complication ([facility] )     Compliant with 1000mg BID metformin for last two months  Does not take Lantus - he's trying to control on oral meds alone  Due to low energy in early morning I believe he may be hypoglycemic over night and recommend a snack before bed  I will keep him off insulin and consider reducing metformin if A1C reveals marked improvement  DM eye exam complete by optometrist  DM foot exam today  Lab Results   Component Value Date    HGBA1C 8 6 (H) 04/03/2021            Relevant Medications    aspirin (ECOTRIN LOW STRENGTH) 81 mg EC tablet    Other Relevant Orders    Microalbumin / creatinine urine ratio    Ambulatory referral to Endocrinology    Hemoglobin A1C    Diabetic neuropathy ([facility] )     Recently increased to 600mg TID by colleague during f/u visit  I agree with this plan and patient feels this is better for symptom control  He still has pain localized to anterior upper left arm which is worse during neuropathy flare-ups; see plan for this below  Lab Results   Component Value Date    HGBA1C 8 6 (H) 04/03/2021               Respiratory    Asthma due to environmental allergies    Relevant Medications    cetirizine (ZyrTEC) 10 mg tablet    Sleep apnea     unrefreshing sleep, daytime somnelence  I believe there is component of hypoglycemia at night which is addressed in the note, but will refer to sleep med for study  Relevant Orders    Ambulatory referral to Sleep Medicine       Other    History of heroin abuse (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  Relevant Medications    Diclofenac Sodium (VOLTAREN) 1 %    Other Relevant Orders    D-dimer, quantitative      Other Visit Diagnoses     Encounter for general adult medical examination with abnormal findings    -  Primary    Enc[facility] diabetic [facility] exam [facility]                Return in about 3 months (around 8/26/2021) for Recheck DM  A chart review was performed and previous primary care visit notes were reviewed  All applicable imaging studies were reviewed and images were reviewed personally  All applicable laboratory studies were reviewed personally  Care everywhere review was performed if  available and all pertinent notes were reviewed  I have spent 40 minutes with Patient and family today in which greater than 50% of this time was spent in counseling/coordination of care regarding Diagnostic results, Prognosis, Risks and benefits of tx options, Intructions for management, Patient and family education, Importance of tx compliance, Risk factor reductions and Impressions  Subjective:     HPI: Marc Adan is a 60 y o  male who  has no past medical history on file  who presented to the office today for a follow up  Two months ago he established care with me  He is again accompanied by his daughter  Before establishing patient was in [facility] [address] for 6 weeks for cocaine and heroin withdrawal and rehabilitation  He is not on Suboxone  He was discharged with Celexa and primidone but no longer taking these 2 medications  He was also started on diabetic medications as he was newly diagnosed with type 2 diabetes  He also states that anxiety and depression are continue challenges for him but he does not want to take medication for this  Today he declines referral to colonoscopy for routine colon screen  He did have a diabetic eye exam from  doctor  He was able to be seen by podiatry where he had his nails trimmed  He also established with psychotherapy after my original referral   He still feels he's okay with pharmacotherapy for anxiety  His chief complaint is chronic fatigue  This is mostly first thing in the morning where he feels "very low energy, difficulty getting out of bed"  He typically does not eat after dinner/before bed  He was seen for a same-day visit for diabetic neuropathy wherein Dr Wally Kehr increased his gabapentin to 600mg TID  Patient complains of pain in the upper left extremity, this is worse during neuropathic flares but chronic  New for the last 2-3 weeks  He is doing strenuous outdoor work but he complains of the "deep" pain mainly focused just this anterior left upper arm area  Denies swelling, erythema, loss of sensation in left arm or unusual warmth/coolness of that extremity  No trauma to the area  Daughter is once again asking for [facility] but he may not need it based on diabetic control  They would like consult with Endo  No other concerns at this time      The following portions of the patient's history were reviewed and updated as appropriate: allergies, current medications, past family history, past medical history, past social history, past surgical history, and problem list     Current Outpatient Medications on File Prior to Visit   Medication Sig Dispense Refill    cholecalciferol (VITAMIN D3) 1,000 units tablet Take 1 tablet (1,000 Units total) by mouth daily 30 tablet 2    gabapentin (NEURONTIN) 300 mg capsule Take 2 capsules (600 mg total) by mouth 3 (three) times a day 180 capsule 2    metFORMIN (GLUCOPHAGE) 1000 MG tablet Take 1 tablet (1,000 mg total) by mouth 2 (two) times a day with meals 180 tablet 0    Continuous Blood Gluc  (FreeStyle [place] 14 Day [place]) AYLA Use 1 Device continuous (Patient not taking: Reported on 4/15/2021) 1 Device 0    Continuous Blood Gluc Sensor (FreeStyle [place]ella 14 Day Sensor) MISC Use 1 Device continuous (Patient not taking: Reported on 4/15/2021) 1 each 0    insulin detemir (LEVEMIR) 100 units/mL subcutaneous injection Inject under the skin      ondansetron (ZOFRAN-ODT) 4 mg disintegrating tablet Take 4 mg by mouth every 6 (six) hours as needed for nausea or vomiting      pantoprazole (PROTONIX) 40 mg tablet Take 1 tablet (40 mg total) by mouth daily (Patient not taking: Reported on 5/26/2021) 60 tablet 0     No current facility-administered medications on file prior to visit  Review of Systems   Constitutional: Negative for chills and fever  HENT: Negative for ear pain and sore throat  Eyes: Negative for pain and visual disturbance  Respiratory: Negative for cough and shortness of breath  Cardiovascular: Negative for chest pain and palpitations  Gastrointestinal: Positive for abdominal distention and nausea  Negative for abdominal pain and vomiting  Gerd symptoms  Genitourinary: Negative for dysuria and hematuria  Musculoskeletal: Negative for arthralgias and back pain  Skin: Negative for color change and rash  Neurological: Negative for seizures and syncope  Psychiatric/Behavioral: Negative for agitation, dysphoric mood, self-injury and suicidal ideas  The patient is nervous/anxious  All other systems reviewed and are negative  BMI Counseling: Body mass index is 26 32 kg/m²  The BMI is above normal  Nutrition recommendations include decreasing portion sizes, encouraging healthy choices of fruits and vegetables, decreasing fast food intake, consuming healthier snacks, limiting drinks that contain sugar, moderation in carbohydrate intake, increasing intake of lean protein, reducing intake of saturated and trans fat and reducing intake of cholesterol   Exercise recommendations include moderate physical activity 150 minutes/week, exercising 3-5 times per week and obtaining a gym membership  No pharmacotherapy was ordered  Objective:    /78 (BP Location: Left arm, Patient Position: Sitting, Cuff Size: Standard)   Pulse 97   Temp 98 6 °F (37 °C) (Temporal)   Resp 20   Ht 4' 11" (1 499 m)   Wt 59 1 kg (130 lb 4 8 oz)   SpO2 97%   BMI 26 32 kg/m²     Physical Exam  Constitutional:       Appearance: Normal appearance  HENT:      Head: Normocephalic  Right Ear: Tympanic membrane, ear canal and external ear normal  There is no impacted cerumen  Left Ear: Tympanic membrane, ear canal and external ear normal  There is no impacted cerumen  Nose: Nose normal       Mouth/Throat:      Mouth: Mucous membranes are moist    Eyes:      Extraocular Movements: Extraocular movements intact  Pupils: Pupils are equal, round, and reactive to light  Neck:      Musculoskeletal: Normal range of motion  Cardiovascular:      Rate and Rhythm: Normal rate and regular rhythm  Pulses: Normal pulses  no weak pulses          Dorsalis pedis pulses are 2+ on the right side and 2+ on the left side  Heart sounds: Normal heart sounds  Pulmonary:      Effort: Pulmonary effort is normal       Breath sounds: Normal breath sounds  Abdominal:      General: Bowel sounds are normal       Palpations: Abdomen is soft  Musculoskeletal: Normal range of motion  General: No tenderness or signs of injury  Right lower leg: No edema  Left lower leg: No edema  Feet:      Right foot:      Skin integrity: No ulcer, skin breakdown, erythema, warmth, callus or dry skin  Left foot:      Skin integrity: No ulcer, skin breakdown, erythema, warmth, callus or dry skin  Skin:     General: Skin is warm and dry  Capillary Refill: Capillary refill takes less than 2 seconds  Findings: No bruising, lesion or rash  Neurological:      General: No focal deficit present        Mental Status: He is alert and oriented to person, place, and time    Psychiatric:         Attention and Perception: Attention normal          Mood and Affect: Affect is tearful  Speech: Speech normal          Behavior: Behavior normal          Thought Content: Thought content normal          Cognition and Memory: Cognition normal          Judgment: Judgment normal        Patient's shoes and socks removed  Right Foot/Ankle   Right Foot Inspection  Skin Exam: skin normal and skin intact no dry skin, no warmth, no callus, no erythema, no maceration, no abnormal color, no pre-ulcer, no ulcer and no callus                          Toe Exam: no swelling, no tenderness, erythema and  no right toe deformity  Sensory       Monofilament testing: intact  Vascular  Capillary refills: < 3 seconds  The right DP pulse is 2+  Right Toe  - Comprehensive Exam  Ecchymosis: none  Arch: normal  Hammertoes: absent  Claw Toes: absent  Swelling: none   Tenderness: none         Left Foot/Ankle  Left Foot Inspection  Skin Exam: skin normal and skin intactno dry skin, no warmth, no erythema, no maceration, normal color, no pre-ulcer, no ulcer and no callus                         Toe Exam: ROM and strength within normal limitsno swelling, no tenderness, no erythema and no left toe deformity                   Sensory       Monofilament: intact  Vascular  Capillary refills: < 3 seconds  The left DP pulse is 2+  Left Toe  - Comprehensive Exam  Ecchymosis: none  Arch: normal  Hammertoes: absent  Claw toes: absent  Swelling: none   Tenderness: none       Assign Risk Category:  No deformity present; No loss of protective sensation; No weak pulses       Risk: 0      AYLA Kovacs  05/28/21  12:43 PM    Patient Instructions   Remember to have a small snack before bedtime to prevent hypoglycemic event (critically low blood sugar at night)

## 2021-05-28 ENCOUNTER — TELEMEDICINE (OUTPATIENT)
Dept: BEHAVIORAL/MENTAL HEALTH CLINIC | Facility: CLINIC | Age: 66
End: 2021-05-28

## 2021-05-28 ENCOUNTER — TELEPHONE (OUTPATIENT)
Dept: FAMILY MEDICINE CLINIC | Facility: CLINIC | Age: 66
End: 2021-05-28

## 2021-05-28 DIAGNOSIS — F32.0 CURRENT MILD EPISODE OF MAJOR DEPRESSIVE DISORDER, UNSPECIFIED WHETHER RECURRENT (HCC): Primary | ICD-10-CM

## 2021-05-28 PROBLEM — M79.622 LEFT UPPER ARM PAIN: Status: ACTIVE | Noted: 2021-05-28

## 2021-05-28 PROBLEM — J45.909 ASTHMA DUE TO ENVIRONMENTAL ALLERGIES: Status: ACTIVE | Noted: 2021-05-28

## 2021-05-28 PROBLEM — G47.30 SLEEP APNEA: Status: ACTIVE | Noted: 2021-05-28

## 2021-05-28 PROCEDURE — 90834 PSYTX W PT 45 MINUTES: CPT | Performed by: SOCIAL WORKER

## 2021-05-28 NOTE — ASSESSMENT & PLAN NOTE
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

## 2021-05-28 NOTE — PSYCH
Assessment/Plan: Adele Hall presented in the session with a sad mood and affect  He was able to verbalize his feelings openly about his past mistakes  He verbalized about his addiction and how it damaged his relationships  There are no diagnoses linked to this encounter  Subjective: I spoke with my dtr regarding my grand dtrs behavior  She spoke to the 10 yr old and told her to respect her grandpa  Yesterday night I was having nightmares and was calling out for my brother and my ex wife  My grandson recorded the episode  I was feeling very tired an exhausted and my whole body was down  My sugar level was at 300 and I was so much exhausted that I couldn't do any thing  I was feeling guilty about my past behavior especially with my addiction  My ex and were together for 40 yrs and she  is always in my dreams  I want to rebuild our relationship as good friends  Patient ID: Saleem Castillo is a 60 y o  male  HPI- 2:25 to 3:15= 50 min    Review of Systems      Objective:Sherwin confessed that he is a recovering addict and has a clean time for 157 days  He seems to have gained good insight and wants to start a new chapter in his life  He agrees to focus on the relapse prevention, relationship rebuilding, and the self care        Physical Exam

## 2021-05-28 NOTE — BH TREATMENT PLAN
[address] [facility]  1955  Date of Treatment Plan:4/16/21    Treatment Goals (after each item selected, indicate outcome measures; (ie: as evidenced by)    [x] Reduce Risk Factors of: relapse on opiates- Sherwin has 157 days clean time   [x] Reduce Major Symptoms of: Depression and anxiety- Jameel Collado continues to feel depressed and slightly anxious   [x] Ameliorate Functional Impairments of: lack of interest and lack of motivation- Sherwin display improved motivation for change   [x] Develop Coping Strategies to Address Stress of: psychosocial stressors- Jameel Collado is motivated to learn coping skills    [x] Stabilize (short term) Crisis of: going out in the crowd away from the comfort zone- Jameel Collado feels nervous in the crowded areas   [x] Maintain (long term) Stabilization of Symptoms of: addiction, anxiety and depression- Jameel Collado continues to work on all 3   [] Medication Referral to:   [x] Maintain Sobriety: from psychoactive substances- good progress evidenced by 157 days clean time     Planned Interventions- Patient Participation (must be consistent with treatment goals):    [x] Assertiveness Training [x] Problem Solving Skills Training   [] Anger Management [x] Solution Focused Techniques   [] Affect Identification and Expression [x] Stress Management   [] Cognitive Restructuring [x] Supportive Therapy   [] Communication Training  [x] Self/Other [place] Training    [x] Grief Work  [x] Decision Options Exploration   [x] Imagery/Relaxation Training [] Decision Option Exploration   [] Parent Training  [x] Pattern Identification and Interruption       [x] Engage Significant Others in Treatment: to involve Sherwin's dtr Emily Mahoney    [x] Facilitate Decision Making Regarding:family issues    [x] Explore/Monitor: anxiety, depression and addictive thinking patterns   [x] Teach Skills of: relaxation, problem solving   [] Educate Regarding:   [] Assign Readings:   [] Referrals Planned:   [x] Use of Resources/Strengths:community resources and past work experience   [x] Preventative Strategies: positive coping skills, positive distractions   [x] Obstacles to Change: psychosocial stressors, lack of motivation and negative environment etc     Estimated Time Frames: 7/16/2021     Goal 1: Develop Coping Strategies to Address Stress of: past and present addictive behaviors   Goal 2: Maintain (long term) Stabilization of Symptoms of: addiction and depression    I have been provided education on my primary diagnosis of: 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

## 2021-05-28 NOTE — ASSESSMENT & PLAN NOTE
Compliant with 1000mg BID metformin for last two months  Does not take Lantus - he's trying to control on oral meds alone  Due to low energy in early morning I believe he may be hypoglycemic over night and recommend a snack before bed  I will keep him off insulin and consider reducing metformin if A1C reveals marked improvement  DM eye exam complete by optometrist  DM foot exam today    Lab Results   Component Value Date    HGBA1C 8 6 (H) 04/03/2021

## 2021-05-28 NOTE — PATIENT INSTRUCTIONS
Depression   WHAT YOU NEED TO KNOW:   Depression is a medical condition that causes feelings of sadness or hopelessness that do not go away  Depression may cause you to lose interest in things you used to enjoy  These feelings may interfere with your daily life  DISCHARGE INSTRUCTIONS:   Call your local emergency number (911 in the [address] [place] [address] Rd[address]) if:   · You think about harming yourself or someone else  · You have done something on purpose to hurt yourself  Call your therapist or doctor if:   · Your symptoms do not improve  · You cannot make it to your next appointment  · You have new symptoms  · You have questions or concerns about your condition or care  The following resources are available at any time to help you, if needed:   · [address] [place] [address]: [phone] (5-475-908-SAPI)     · Suicide Hotline: 1-839.472.7145 (8-885-PEJPWCL)     · For a list of international numbers: https://save org/find-help/international-resources/    Medicines:   · Antidepressants  may be given to improve or balance your mood  You may need to take this medicine for several weeks before you begin to feel better  · Take your medicine as directed  Contact your healthcare provider if you think your medicine is not helping or if you have side effects  Tell him of her if you are allergic to any medicine  Keep a list of the medicines, vitamins, and herbs you take  Include the amounts, and when and why you take them  Bring the list or the pill bottles to follow-up visits  Carry your medicine list with you in case of an emergency  Therapy  is often used together with medicine to relieve depression  Therapy is a way for you to talk about your feelings and anything that may be causing depression  Therapy can be done alone or in a group  It may also be done with family members or a significant other  Self-care:   · Get regular physical activity  Try to be active for 30 minutes, 3 to 5 days a week   Physical activity can help relieve depression  Work with your healthcare provider to develop a plan that you enjoy  It may help to ask someone to be active with you  · Create a regular sleep schedule  A routine can help you relax before bed  Listen to music, read, or do yoga  Try to go to bed and wake up at the same time every day  Sleep is important for emotional health  · Eat a variety of healthy foods  Healthy foods include fruits, vegetables, whole-grain breads, low-fat dairy products, lean meats, fish, and cooked beans  A healthy meal plan is low in fat, salt, and added sugar  · Do not drink alcohol or use drugs  Alcohol and drugs can make depression worse  Talk to your therapist or doctor if you need help quitting  Follow up with your healthcare provider as directed: Your healthcare provider will monitor your progress at follow-up visits  He or she will also monitor your medicine if you take antidepressants  Your healthcare provider will ask if the medicine is helping  Tell him or her about any side effects or problems you may have with your medicine  The type or amount of medicine may need to be changed  Write down your questions so you remember to ask them during your visits  © Copyright [address] [place] [address] Drive Information is for End User's use only and may not be sold, redistributed or otherwise used for commercial purposes  All illustrations and images included in CareNotes® are the copyrighted property of [employer]  or [facility] Dedra Zuleta   The above information is an  only  It is not intended as medical advice for individual conditions or treatments  Talk to your doctor, nurse or pharmacist before following any medical regimen to see if it is safe and effective for you

## 2021-05-28 NOTE — ASSESSMENT & PLAN NOTE
unrefreshing sleep, daytime somnelence  I believe there is component of hypoglycemia at night which is addressed in the note, but will refer to sleep med for study

## 2021-05-28 NOTE — ASSESSMENT & PLAN NOTE
Recently increased to 600mg TID by colleague during f/u visit  I agree with this plan and patient feels this is better for symptom control  He still has pain localized to anterior upper left arm which is worse during neuropathy flare-ups; see plan for this below    Lab Results   Component Value Date    HGBA1C 8 6 (H) 04/03/2021

## 2021-05-28 NOTE — ASSESSMENT & PLAN NOTE
Not on suboxone  Denies night sweats, diarrhea, chest pain, SOB, headaches, or insomnia  Slight tremors from withdrawal remain  He has f/u with mental health provider  Continues to decline pharmacotherapy

## 2021-06-02 ENCOUNTER — IMMUNIZATIONS (OUTPATIENT)
Dept: FAMILY MEDICINE CLINIC | Facility: HOSPITAL | Age: 66
End: 2021-06-02

## 2021-06-02 DIAGNOSIS — Z23 ENCOUNTER FOR IMMUNIZATION: Primary | ICD-10-CM

## 2021-06-02 PROCEDURE — 0012A SARS-COV-2 / COVID-19 MRNA VACCINE (MODERNA) 100 MCG: CPT

## 2021-06-02 PROCEDURE — 91301 SARS-COV-2 / COVID-19 MRNA VACCINE (MODERNA) 100 MCG: CPT

## 2021-06-04 ENCOUNTER — TELEMEDICINE (OUTPATIENT)
Dept: BEHAVIORAL/MENTAL HEALTH CLINIC | Facility: CLINIC | Age: 66
End: 2021-06-04

## 2021-06-04 DIAGNOSIS — F41.1 GENERALIZED ANXIETY DISORDER: Primary | ICD-10-CM

## 2021-06-04 NOTE — BH TREATMENT PLAN
[address] [facility]  1955  Date of Treatment Plan:4/16/21    Treatment Goals (after each item selected, indicate outcome measures; (ie: as evidenced by)    [x] Reduce Risk Factors of: relapse on opiates- Missy Gomez continues to maintain abstinence from opiates   [x] Reduce Major Symptoms of: Depression and anxiety- Missy Gomez continues to feel depressed and anxious   [x] Ameliorate Functional Impairments of: lack of interest and lack of motivation- Sherwin display improved interest and motivation for change   [x] Develop Coping Strategies to Address Stress of: psychosocial stressors- Missy Gomez is  Learning positive coping skills    [x] Stabilize (short term) Crisis of: going out in the crowd away from the comfort zone- Missy Gomez continues to feel nervous in the crowded areas   [x] Maintain (long term) Stabilization of Symptoms of: addiction, anxiety and depression- Missy Gomez continues to work on all 3 long term goals   [] Medication Referral to:   [x] Maintain Sobriety: from psychoactive substances- good progress evidenced by 164 days clean time     Planned Interventions- Patient Participation (must be consistent with treatment goals):    [x] Assertiveness Training [x] Problem Solving Skills Training   [] Anger Management [x] Solution Focused Techniques   [] Affect Identification and Expression [x] Stress Management   [] Cognitive Restructuring [x] Supportive Therapy   [] Communication Training  [x] Self/Other [place] Training    [x] Grief Work  [x] Decision Options Exploration   [x] Imagery/Relaxation Training [] Decision Option Exploration   [] Parent Training  [x] Pattern Identification and Interruption       [x] Engage Significant Others in Treatment: to involve Sherwin's dtdao Kaye    [x] Facilitate Decision Making Regarding:family issues    [x] Explore/Monitor: anxiety, depression and addictive thinking patterns   [x] Teach Skills of: relaxation, problem solving   [] Educate Regarding:   [] Assign Readings: [] Referrals Planned:   [x] Use of Resources/Strengths:community resources and past work experience   [x] Preventative Strategies: positive coping skills, positive distractions   [x] Obstacles to Change: psychosocial stressors, lack of motivation and negative environment etc     Estimated Time Frames: 7/16/2021     Goal 1: Develop Coping Strategies to Address Stress of: past and present addictive behaviors   Goal 2: Maintain (long term) Stabilization of Symptoms of: addiction and depression    I have been provided education on my primary diagnosis of: 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

## 2021-06-04 NOTE — PSYCH
Assessment/Plan: Orly Avila presented in the session with an euthymic mood and affect  He verbalized his feelings openly about his family dynamics and how it impacted him  He also verbalized about his addiction and how it affected his relationships  He seems motivated to maintain abstinence and recover completely  There are no diagnoses linked to this encounter  Subjective: I took my 2nd shot yesterday and was feeling sick really bad  I was sleeping all day yesterday  Today I am feeling much better  I had a great memorial day weekend with my family  My son came to visit me from [place]  We are planning for a camping trip with my family  One more family event is coming up in [place]  My sisters grand dtr is getting Episcopal at home and then at the Buddhist  My dtrs don't want me to go to [place] because they are afraid that I may relapse  My brother kicked me out of his appt because I was using  Then he asked my son to leave which made me upset  My ex wife of 36 yrs left me because of my addiction  Patient ID: Holly Santillan is a 60 y o  male  HPI- 3:35 to 4:25= 50 min    Review of Leopoldo Juanier is living in a much more safer environment currently  He is able to maintain abstinence with the help of his family  He actively participated in the session  He wants to maintain the focus on relapse prevention, relationship rebuilding, and self care        Physical Exam

## 2021-06-04 NOTE — PATIENT INSTRUCTIONS
Generalized Anxiety Disorder   WHAT YOU NEED TO KNOW:   General anxiety disorder (OMID) is a condition that causes you to worry more than normal  It also causes you to feel fear in most situations  You are worried or afraid even without a cause  You may worry about your health, job, money, and relationships  It is hard for you to control your worry and feel calm  OMID prevents you from doing daily activities  It may also prevent you from spending time with family and friends  Without treatment, your anxiety may get worse  DISCHARGE INSTRUCTIONS:   Call your local emergency number (911 in the [address] [facility] [address]) for any of the following:   · You have chest pain, tightness, or heaviness that may spread to your shoulders, arms, jaw, neck, or back  · You feel like hurting yourself or someone else  Call your doctor if:   · Your symptoms get worse or do not get better with treatment  · You have new symptoms since your last visit  · You have questions or concerns about your condition or care  Medicines:   · Medicines  help you feel more calm and relaxed, and decrease your symptoms  · Take your medicine as directed  Contact your healthcare provider if you think your medicine is not helping or if you have side effects  Tell him of her if you are allergic to any medicine  Keep a list of the medicines, vitamins, and herbs you take  Include the amounts, and when and why you take them  Bring the list or the pill bottles to follow-up visits  Carry your medicine list with you in case of an emergency  Manage anxiety:   · Talk to someone about your anxiety  Your healthcare provider may suggest counseling  Cognitive behavioral therapy can help you understand and change how you react to events  It can also help you understand what triggers your symptoms  You might feel more comfortable talking with a friend or family member about your anxiety  Choose someone you know will be supportive and encouraging      · Keep a journal of your symptoms  Write down what you were doing before your symptoms started  Also write down what made the anxiety better or worse  Bring this journal with you to your follow-up appointments  · Find ways to relax  Activities such as yoga, meditation, or listening to music can help you relax  Spend time with friends, or do things you enjoy  · Practice deep breathing  Deep breathing can help you relax when you feel anxious  Focus on taking slow, deep breaths several times a day, or during an anxiety attack  Slowly breathe in through your nose  Pause, then slowly breathe out through your mouth  Try to breathe out longer than you breathed in  · Create a regular sleep routine  Regular sleep can help you feel calmer during the day  Go to sleep and wake up at the same times every day  Do not watch television or use the computer right before bed  Your room should be comfortable, dark, and quiet  · Eat a variety of healthy foods  Healthy foods include fruits, vegetables, low-fat dairy products, lean meats, fish, whole-grain breads, and cooked beans  Healthy foods can help you feel less anxious and have more energy  · Exercise regularly  Exercise can increase your energy level  Exercise may also lift your mood and help you sleep better  Your healthcare provider can help you create an exercise plan  · Do not smoke  Nicotine and other chemicals in cigarettes and cigars can increase anxiety  Ask your healthcare provider for information if you currently smoke and need help to quit  E-cigarettes or smokeless tobacco still contain nicotine  Talk to your healthcare provider before you use these products  · Do not have caffeine  Caffeine can make your symptoms worse  Do not have foods or drinks that are meant to increase your energy level  · Limit or do not drink alcohol  Ask your healthcare provider if alcohol is safe for you  Also ask how much is safe   You may not be able to drink alcohol if you take certain anxiety or depression medicines  · Do not use drugs  Drugs can make your anxiety worse  It can also make anxiety hard to manage  Talk to your healthcare provider if you use drugs and want help to quit  Follow up with your healthcare provider as directed:  Write down your questions so you remember to ask them during your visits  © Copyright [address] Information is for End User's use only and may not be sold, redistributed or otherwise used for commercial purposes  All illustrations and images included in CareNotes® are the copyrighted property of A [employer] , Inc  or [facility] Dedra Zuleta   The above information is an  only  It is not intended as medical advice for individual conditions or treatments  Talk to your doctor, nurse or pharmacist before following any medical regimen to see if it is safe and effective for you

## 2021-06-08 ENCOUNTER — TELEPHONE (OUTPATIENT)
Dept: FAMILY MEDICINE CLINIC | Facility: CLINIC | Age: 66
End: 2021-06-08

## 2021-06-08 NOTE — TELEPHONE ENCOUNTER
I can't see the documentation details as to what was done  I did kind of go over that with her a little  I can certainly let her know the endocrinologist may have better ideas how to manage how he takes his sugars/getting a CGM covered

## 2021-06-08 NOTE — TELEPHONE ENCOUNTER
Request for call back  I spoke to her  She wondered why the [employer] was denied  I explained it to her  With your approval, I believe if I do a letter of medical necessity that states the pt is a recovering addict and has issues sticking himself due to the neuropathy, it may get approved  I would also like to complete an IEB for him to see what assistance at home he qualifies for  Sue Reddy expressed she's concerned he needs a little help at home  I also gave her the information for the [address] [place] [address] since she expressed some concerns about his current therapy

## 2021-06-11 ENCOUNTER — TELEMEDICINE (OUTPATIENT)
Dept: BEHAVIORAL/MENTAL HEALTH CLINIC | Facility: CLINIC | Age: 66
End: 2021-06-11

## 2021-06-11 DIAGNOSIS — F41.0 GENERALIZED ANXIETY DISORDER WITH PANIC ATTACKS: Primary | ICD-10-CM

## 2021-06-11 DIAGNOSIS — F41.1 GENERALIZED ANXIETY DISORDER WITH PANIC ATTACKS: Primary | ICD-10-CM

## 2021-06-11 NOTE — TELEPHONE ENCOUNTER
Currently working on [address] [facility] [place] form, will update daughter once I have signed and faxed
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
## 2021-06-14 NOTE — PATIENT INSTRUCTIONS
Generalized Anxiety Disorder   WHAT YOU NEED TO KNOW:   General anxiety disorder (OMID) is a condition that causes you to worry more than normal  It also causes you to feel fear in most situations  You are worried or afraid even without a cause  You may worry about your health, job, money, and relationships  It is hard for you to control your worry and feel calm  OMID prevents you from doing daily activities  It may also prevent you from spending time with family and friends  Without treatment, your anxiety may get worse  DISCHARGE INSTRUCTIONS:   Call your local emergency number (911 in the [address]) for any of the following:   · You have chest pain, tightness, or heaviness that may spread to your shoulders, arms, jaw, neck, or back  · You feel like hurting yourself or someone else  Call your doctor if:   · Your symptoms get worse or do not get better with treatment  · You have new symptoms since your last visit  · You have questions or concerns about your condition or care  Medicines:   · Medicines  help you feel more calm and relaxed, and decrease your symptoms  · Take your medicine as directed  Contact your healthcare provider if you think your medicine is not helping or if you have side effects  Tell him of her if you are allergic to any medicine  Keep a list of the medicines, vitamins, and herbs you take  Include the amounts, and when and why you take them  Bring the list or the pill bottles to follow-up visits  Carry your medicine list with you in case of an emergency  Manage anxiety:   · Talk to someone about your anxiety  Your healthcare provider may suggest counseling  Cognitive behavioral therapy can help you understand and change how you react to events  It can also help you understand what triggers your symptoms  You might feel more comfortable talking with a friend or family member about your anxiety  Choose someone you know will be supportive and encouraging      · Keep a journal of your symptoms  Write down what you were doing before your symptoms started  Also write down what made the anxiety better or worse  Bring this journal with you to your follow-up appointments  · Find ways to relax  Activities such as yoga, meditation, or listening to music can help you relax  Spend time with friends, or do things you enjoy  · Practice deep breathing  Deep breathing can help you relax when you feel anxious  Focus on taking slow, deep breaths several times a day, or during an anxiety attack  Slowly breathe in through your nose  Pause, then slowly breathe out through your mouth  Try to breathe out longer than you breathed in  · Create a regular sleep routine  Regular sleep can help you feel calmer during the day  Go to sleep and wake up at the same times every day  Do not watch television or use the computer right before bed  Your room should be comfortable, dark, and quiet  · Eat a variety of healthy foods  Healthy foods include fruits, vegetables, low-fat dairy products, lean meats, fish, whole-grain breads, and cooked beans  Healthy foods can help you feel less anxious and have more energy  · Exercise regularly  Exercise can increase your energy level  Exercise may also lift your mood and help you sleep better  Your healthcare provider can help you create an exercise plan  · Do not smoke  Nicotine and other chemicals in cigarettes and cigars can increase anxiety  Ask your healthcare provider for information if you currently smoke and need help to quit  E-cigarettes or smokeless tobacco still contain nicotine  Talk to your healthcare provider before you use these products  · Do not have caffeine  Caffeine can make your symptoms worse  Do not have foods or drinks that are meant to increase your energy level  · Limit or do not drink alcohol  Ask your healthcare provider if alcohol is safe for you  Also ask how much is safe   You may not be able to drink alcohol if you take certain anxiety or depression medicines  · Do not use drugs  Drugs can make your anxiety worse  It can also make anxiety hard to manage  Talk to your healthcare provider if you use drugs and want help to quit  Follow up with your healthcare provider as directed:  Write down your questions so you remember to ask them during your visits  © Copyright [address] Information is for End User's use only and may not be sold, redistributed or otherwise used for commercial purposes  All illustrations and images included in CareNotes® are the copyrighted property of A D A M , Inc  or [facility] Dedra Zuleta   The above information is an  only  It is not intended as medical advice for individual conditions or treatments  Talk to your doctor, nurse or pharmacist before following any medical regimen to see if it is safe and effective for you

## 2021-06-14 NOTE — BH TREATMENT PLAN
[address] [facility]  1955  Date of Treatment Plan:4/16/21    Treatment Goals (after each item selected, indicate outcome measures; (ie: as evidenced by)    [x] Reduce Risk Factors of: relapse on opiates- Meryl Aguilar has maintained abstinence from psychoactive substances  [x] Reduce Major Symptoms of: Depression and anxiety- Sherwin denies depression but reports anxiety and worry issues's      [x] Ameliorate Functional Impairments of: lack of interest and lack of motivation- Meryl Aguilar continues to have the motivation to make the changes in his life   [x] Develop Coping Strategies to Address Stress of: psychosocial stressors- Meryl Aguilar is learning coping skills    [x] Stabilize (short term) Crisis of: going out in the crowd away from the comfort zone- Meryl Aguilar feels nervous in the crowded areas   [x] Maintain (long term) Stabilization of Symptoms of: addiction, anxiety and depression- Meryl Aguilar continues to work on all 3 and has made considerable progress   [] Medication Referral to:   [x] Maintain Sobriety: from psychoactive substances- good progress evidenced by 5 months clean time     Planned Interventions- Patient Participation (must be consistent with treatment goals):    [x] Assertiveness Training [x] Problem Solving Skills Training   [] Anger Management [x] Solution Focused Techniques   [] Affect Identification and Expression [x] Stress Management   [] Cognitive Restructuring [x] Supportive Therapy   [] Communication Training  [x] Self/Other Humphreys Training    [x] Grief Work  [x] Decision Options Exploration   [x] Imagery/Relaxation Training [] Decision Option Exploration   [] Parent Training  [x] Pattern Identification and Interruption       [x] Engage Significant Others in Treatment: to involve Sherwin's dtr Mary Monet    [x] Facilitate Decision Making Regarding:family issues    [x] Explore/Monitor: anxiety, depression and addictive thinking patterns   [x] Teach Skills of: relaxation, problem solving   [] Educate Regarding:   [] Assign Readings:   [] Referrals Planned:   [x] Use of Resources/Strengths:community resources and past work experience   [x] Preventative Strategies: positive coping skills, positive distractions   [x] Obstacles to Change: psychosocial stressors, lack of motivation and negative environment etc     Estimated Time Frames: 7/16/2021     Goal 1: Develop Coping Strategies to Address Stress of: past and present addictive behaviors   Goal 2: Maintain (long term) Stabilization of Symptoms of: addiction and depression    I have been provided education on my primary diagnosis of: 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

## 2021-06-18 ENCOUNTER — TELEPHONE (OUTPATIENT)
Dept: FAMILY MEDICINE CLINIC | Facility: CLINIC | Age: 66
End: 2021-06-18

## 2021-06-18 NOTE — TELEPHONE ENCOUNTER
This therapist contacted Nayely Medeiros and left a detailed voicemail  He is awaiting a call back and plans to schedule an appt

## 2021-06-24 ENCOUNTER — TELEPHONE (OUTPATIENT)
Dept: BEHAVIORAL/MENTAL HEALTH CLINIC | Facility: CLINIC | Age: 66
End: 2021-06-24

## 2021-06-24 NOTE — TELEPHONE ENCOUNTER
This therapist contacted Ceci Scott and reminded him about his appt  Later he in the afternoon he contacted Ceci Scott again as he didn't show up for his visit

## 2021-06-28 ENCOUNTER — TELEPHONE (OUTPATIENT)
Dept: FAMILY MEDICINE CLINIC | Facility: CLINIC | Age: 66
End: 2021-06-28

## 2021-06-28 NOTE — TELEPHONE ENCOUNTER
This therapist contacted Alicia Knott and reminded him about his appt today  Alicia Knott reported that he has been busy and missed the last 2 appts   He added that today is not a good day for him and requested for an appt on Friday 7/02 at 2pm

## 2021-07-02 ENCOUNTER — TELEMEDICINE (OUTPATIENT)
Dept: BEHAVIORAL/MENTAL HEALTH CLINIC | Facility: CLINIC | Age: 66
End: 2021-07-02

## 2021-07-02 DIAGNOSIS — F41.1 GAD (GENERALIZED ANXIETY DISORDER): Primary | ICD-10-CM

## 2021-07-02 NOTE — TELEPHONE ENCOUNTER
Form completed by provider on 07/02/21    Confirmation received    Completed form placed in scan bin
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
## 2021-07-06 NOTE — PATIENT INSTRUCTIONS
Generalized Anxiety Disorder   WHAT YOU NEED TO KNOW:   General anxiety disorder (OMID) is a condition that causes you to worry more than normal  It also causes you to feel fear in most situations  You are worried or afraid even without a cause  You may worry about your health, job, money, and relationships  It is hard for you to control your worry and feel calm  OMID prevents you from doing daily activities  It may also prevent you from spending time with family and friends  Without treatment, your anxiety may get worse  DISCHARGE INSTRUCTIONS:   Call your local emergency number (911 in the [address]) for any of the following:   · You have chest pain, tightness, or heaviness that may spread to your shoulders, arms, jaw, neck, or back  · You feel like hurting yourself or someone else  Call your doctor if:   · Your symptoms get worse or do not get better with treatment  · You have new symptoms since your last visit  · You have questions or concerns about your condition or care  Medicines:   · Medicines  help you feel more calm and relaxed, and decrease your symptoms  · Take your medicine as directed  Contact your healthcare provider if you think your medicine is not helping or if you have side effects  Tell him of her if you are allergic to any medicine  Keep a list of the medicines, vitamins, and herbs you take  Include the amounts, and when and why you take them  Bring the list or the pill bottles to follow-up visits  Carry your medicine list with you in case of an emergency  Manage anxiety:   · Talk to someone about your anxiety  Your healthcare provider may suggest counseling  Cognitive behavioral therapy can help you understand and change how you react to events  It can also help you understand what triggers your symptoms  You might feel more comfortable talking with a friend or family member about your anxiety  Choose someone you know will be supportive and encouraging      · Keep a journal of your symptoms  Write down what you were doing before your symptoms started  Also write down what made the anxiety better or worse  Bring this journal with you to your follow-up appointments  · Find ways to relax  Activities such as yoga, meditation, or listening to music can help you relax  Spend time with friends, or do things you enjoy  · Practice deep breathing  Deep breathing can help you relax when you feel anxious  Focus on taking slow, deep breaths several times a day, or during an anxiety attack  Slowly breathe in through your nose  Pause, then slowly breathe out through your mouth  Try to breathe out longer than you breathed in  · Create a regular sleep routine  Regular sleep can help you feel calmer during the day  Go to sleep and wake up at the same times every day  Do not watch television or use the computer right before bed  Your room should be comfortable, dark, and quiet  · Eat a variety of healthy foods  Healthy foods include fruits, vegetables, low-fat dairy products, lean meats, fish, whole-grain breads, and cooked beans  Healthy foods can help you feel less anxious and have more energy  · Exercise regularly  Exercise can increase your energy level  Exercise may also lift your mood and help you sleep better  Your healthcare provider can help you create an exercise plan  · Do not smoke  Nicotine and other chemicals in cigarettes and cigars can increase anxiety  Ask your healthcare provider for information if you currently smoke and need help to quit  E-cigarettes or smokeless tobacco still contain nicotine  Talk to your healthcare provider before you use these products  · Do not have caffeine  Caffeine can make your symptoms worse  Do not have foods or drinks that are meant to increase your energy level  · Limit or do not drink alcohol  Ask your healthcare provider if alcohol is safe for you  Also ask how much is safe   You may not be able to drink alcohol if you take certain anxiety or depression medicines  · Do not use drugs  Drugs can make your anxiety worse  It can also make anxiety hard to manage  Talk to your healthcare provider if you use drugs and want help to quit  Follow up with your healthcare provider as directed:  Write down your questions so you remember to ask them during your visits  © Copyright [address] Information is for End User's use only and may not be sold, redistributed or otherwise used for commercial purposes  All illustrations and images included in CareNotes® are the copyrighted property of A D A M , Inc  or [facility] Dedra Zuleta   The above information is an  only  It is not intended as medical advice for individual conditions or treatments  Talk to your doctor, nurse or pharmacist before following any medical regimen to see if it is safe and effective for you

## 2021-07-06 NOTE — BH TREATMENT PLAN
[address] [facility]  1955  Date of Treatment Plan:4/16/21    Treatment Goals (after each item selected, indicate outcome measures; (ie: as evidenced by)    [x] Reduce Risk Factors of: relapse on opiates- Tri has maintained abstinence from psychoactive substances- good progress    [x] Reduce Major Symptoms of: Depression and anxiety- Sherwin denies depression but reports anxiety and worry issues- medium progress      [x] Ameliorate Functional Impairments of: lack of interest and lack of motivation- Tri continues to have the motivation to make positive changes in his life- moderate progress   [x] Develop Coping Strategies to Address Stress of: psychosocial stressors- Tri is practicing the positive coping skills - moderate progress   [x] Stabilize (short term) Crisis of: going out in the crowd away from the comfort zone- Tri feels nervous in the crowded areas- slow progress   [x] Maintain (long term) Stabilization of Symptoms of: addiction, anxiety and depression- Tri continues to work on all 3 and has made considerable progress- moderate   [] Medication Referral to:   [x] Maintain Sobriety: from psychoactive substances- good progress evidenced by the clean time     Planned Interventions- Patient Participation (must be consistent with treatment goals):    [x] Assertiveness Training [x] Problem Solving Skills Training   [] Anger Management [x] Solution Focused Techniques   [] Affect Identification and Expression [x] Stress Management   [] Cognitive Restructuring [x] Supportive Therapy   [] Communication Training  [x] Self/Other Delta Training    [x] Grief Work  [x] Decision Options Exploration   [x] Imagery/Relaxation Training [] Decision Option Exploration   [] Parent Training  [x] Pattern Identification and Interruption       [x] Engage Significant Others in Treatment: to involve Sherwin's dtr Geraldine Dowling    [x] Facilitate Decision Making Regarding:family issues    [x] Explore/Monitor: anxiety, depression and addictive thinking patterns   [x] Teach Skills of: relaxation, problem solving   [] Educate Regarding:   [] Assign Readings:   [] Referrals Planned:   [x] Use of Resources/Strengths:community resources and past work experience   [x] Preventative Strategies: positive coping skills, positive distractions   [x] Obstacles to Change: psychosocial stressors, lack of motivation and negative environment etc     Estimated Time Frames: 7/16/2021     Goal 1: Develop Coping Strategies to Address Stress of: past and present addictive behaviors   Goal 2: Maintain (long term) Stabilization of Symptoms of: addiction and depression    I have been provided education on my primary diagnosis of: 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

## 2021-07-12 ENCOUNTER — TELEPHONE (OUTPATIENT)
Dept: FAMILY MEDICINE CLINIC | Facility: CLINIC | Age: 66
End: 2021-07-12

## 2021-07-12 NOTE — TELEPHONE ENCOUNTER
This therapist contacted Deon Rubio and reminded him about the virtual visit on 7/09  Deon Rubio stated that he prefers his visits on Fridays at 3pm  The therapist planned to see him at [address] [place] [address] did not show up for the visit  So the therapist contacted and left a detailed voicemail  He is awaiting a call back from larisa

## 2021-07-16 ENCOUNTER — TELEMEDICINE (OUTPATIENT)
Dept: BEHAVIORAL/MENTAL HEALTH CLINIC | Facility: CLINIC | Age: 66
End: 2021-07-16
Payer: COMMERCIAL

## 2021-07-16 DIAGNOSIS — E11.49 OTHER DIABETIC NEUROLOGICAL COMPLICATION ASSOCIATED WITH TYPE 2 DIABETES MELLITUS (HCC): ICD-10-CM

## 2021-07-16 DIAGNOSIS — F40.10 SOCIAL ANXIETY DISORDER: Primary | ICD-10-CM

## 2021-07-16 DIAGNOSIS — E11.69 TYPE 2 DIABETES MELLITUS WITH OTHER SPECIFIED COMPLICATION, UNSPECIFIED WHETHER LONG TERM INSULIN USE (HCC): ICD-10-CM

## 2021-07-16 DIAGNOSIS — F11.21 OPIOID DEPENDENCE IN REMISSION (HCC): ICD-10-CM

## 2021-07-16 DIAGNOSIS — E55.9 VITAMIN D DEFICIENCY: ICD-10-CM

## 2021-07-16 PROCEDURE — 90832 PSYTX W PT 30 MINUTES: CPT | Performed by: SOCIAL WORKER

## 2021-07-16 RX ORDER — MELATONIN
Qty: 30 TABLET | Refills: 2 | Status: SHIPPED | OUTPATIENT
Start: 2021-07-16 | End: 2021-10-12

## 2021-07-16 NOTE — BH TREATMENT PLAN
[address] [facility]  1955  Date of Treatment Plan:7/16/21    Treatment Goals (after each item selected, indicate outcome measures; (ie: as evidenced by)    [x] Reduce Risk Factors of: relapse on opiates- Lucy Onofre has maintained abstinence from psychoactive substances- good progress    [x] Reduce Major Symptoms of: Depression and anxiety- Lucy Onofre denies depression, anxiety and worry issues- good progress  [x] Ameliorate Functional Impairments of: lack of interest and lack of motivation- Lucy Onofre continues to have the motivation to make positive changes in his life- good progress   [x] Develop Coping Strategies to Address Stress of: psychosocial stressors- Lucy Onofre is practicing the positive coping skills daily - good progress   [x] Stabilize (short term) Crisis of: going out in the crowd away from the comfort zone- Sherwin started going to the crowded places- good progress   [x] Maintain (long term) Stabilization of Symptoms of: addiction, anxiety and depression- Lucy Onofre continues to work on all 3 and has made considerable progress- good progress      [] Medication Referral to:   [x] Maintain Sobriety: from psychoactive substances- good progress evidenced by the clean time     Planned Interventions- Patient Participation (must be consistent with treatment goals):    [x] Assertiveness Training [x] Problem Solving Skills Training   [] Anger Management [x] Solution Focused Techniques   [] Affect Identification and Expression [x] Stress Management   [] Cognitive Restructuring [x] Supportive Therapy   [] Communication Training  [x] Self/Other Noble Training    [x] Grief Work  [x] Decision Options Exploration   [x] Imagery/Relaxation Training [] Decision Option Exploration   [] Parent Training  [x] Pattern Identification and Interruption       [] Engage Significant Others in Treatment: to involve Sherwin's dtr [place]    [x] Facilitate Decision Making Regarding:housing issue    [x] Explore/Monitor: anxiety, depression and addictive thinking patterns   [x] Teach Skills of: relaxation, problem solving   [] Educate Regarding:   [] Assign Readings:   [] Referrals Planned:   [x] Use of Resources/Strengths:community resources and past work experience   [x] Preventative Strategies: positive coping skills, positive distractions   [x] Obstacles to Change: psychosocial stressors, lack of motivation and negative environment etc     Estimated Time Frames: 10/16/2021     Goal 1: Learn to manage anxiety/worry issues and learn relaxation skills  Goal 2: Maintain abstinence from psychoactive substances  Also maintain positive relationships, hobbies and interests  I have been provided education on my primary diagnosis of: F11 24- Social anxiety disorder  My therapist and I have developed this plan together, and I am in agreement to working on these issues and goals  I understand the plan that has been developed for my treatment  [] Patient Declined copy of treatment plan

## 2021-07-16 NOTE — PATIENT INSTRUCTIONS
Generalized Anxiety Disorder   WHAT YOU NEED TO KNOW:   General anxiety disorder (OMID) is a condition that causes you to worry more than normal  It also causes you to feel fear in most situations  You are worried or afraid even without a cause  You may worry about your health, job, money, and relationships  It is hard for you to control your worry and feel calm  OMID prevents you from doing daily activities  It may also prevent you from spending time with family and friends  Without treatment, your anxiety may get worse  DISCHARGE INSTRUCTIONS:   Call your local emergency number (911 in the [address]) for any of the following:   · You have chest pain, tightness, or heaviness that may spread to your shoulders, arms, jaw, neck, or back  · You feel like hurting yourself or someone else  Call your doctor if:   · Your symptoms get worse or do not get better with treatment  · You have new symptoms since your last visit  · You have questions or concerns about your condition or care  Medicines:   · Medicines  help you feel more calm and relaxed, and decrease your symptoms  · Take your medicine as directed  Contact your healthcare provider if you think your medicine is not helping or if you have side effects  Tell him of her if you are allergic to any medicine  Keep a list of the medicines, vitamins, and herbs you take  Include the amounts, and when and why you take them  Bring the list or the pill bottles to follow-up visits  Carry your medicine list with you in case of an emergency  Manage anxiety:   · Talk to someone about your anxiety  Your healthcare provider may suggest counseling  Cognitive behavioral therapy can help you understand and change how you react to events  It can also help you understand what triggers your symptoms  You might feel more comfortable talking with a friend or family member about your anxiety  Choose someone you know will be supportive and encouraging      · Keep a journal of your symptoms  Write down what you were doing before your symptoms started  Also write down what made the anxiety better or worse  Bring this journal with you to your follow-up appointments  · Find ways to relax  Activities such as yoga, meditation, or listening to music can help you relax  Spend time with friends, or do things you enjoy  · Practice deep breathing  Deep breathing can help you relax when you feel anxious  Focus on taking slow, deep breaths several times a day, or during an anxiety attack  Slowly breathe in through your nose  Pause, then slowly breathe out through your mouth  Try to breathe out longer than you breathed in  · Create a regular sleep routine  Regular sleep can help you feel calmer during the day  Go to sleep and wake up at the same times every day  Do not watch television or use the computer right before bed  Your room should be comfortable, dark, and quiet  · Eat a variety of healthy foods  Healthy foods include fruits, vegetables, low-fat dairy products, lean meats, fish, whole-grain breads, and cooked beans  Healthy foods can help you feel less anxious and have more energy  · Exercise regularly  Exercise can increase your energy level  Exercise may also lift your mood and help you sleep better  Your healthcare provider can help you create an exercise plan  · Do not smoke  Nicotine and other chemicals in cigarettes and cigars can increase anxiety  Ask your healthcare provider for information if you currently smoke and need help to quit  E-cigarettes or smokeless tobacco still contain nicotine  Talk to your healthcare provider before you use these products  · Do not have caffeine  Caffeine can make your symptoms worse  Do not have foods or drinks that are meant to increase your energy level  · Limit or do not drink alcohol  Ask your healthcare provider if alcohol is safe for you  Also ask how much is safe   You may not be able to drink alcohol if you take certain anxiety or depression medicines  · Do not use drugs  Drugs can make your anxiety worse  It can also make anxiety hard to manage  Talk to your healthcare provider if you use drugs and want help to quit  Follow up with your healthcare provider as directed:  Write down your questions so you remember to ask them during your visits  © Copyright [address] Information is for End User's use only and may not be sold, redistributed or otherwise used for commercial purposes  All illustrations and images included in CareNotes® are the copyrighted property of A D A M , Inc  or [facility] Dedra Zuleta   The above information is an  only  It is not intended as medical advice for individual conditions or treatments  Talk to your doctor, nurse or pharmacist before following any medical regimen to see if it is safe and effective for you

## 2021-07-16 NOTE — PSYCH
Assessment/Plan: Sharon Sawyer presented in the session with an euthymic mood and affect  He participated actively in the session and requested for a short visit today  He verbalized his feelings and emotions openly  He described in detail about his family dynamics and how it impacts him  He happily verbalized how he is working on his relationships  He denied depression and anxiety currently  He continues to be motivated to make positive changes in his life  Plan: help him learn the problem solving skills, and the cope ahead skills  There are no diagnoses linked to this encounter  Subjective: I am spending time with my grand dtr  I am taking her to the park, to the Baptism  Me and my granddtr is getting along very well  We play cards and I teach her how to garden  My brother whom I hate texted my dtr if he could stop by to meet me  He was driving with my mom to [place]  He and my mom stopped by and we had a great time  He gave me a handshake but I gave him a hug  I didn't forgave him yet because of what he did to me and my son  I am working on my plan B to look for the [employer] appt  My dtrs partner Erica Antonio will be out from half-way in September  Its already crowded over her  I am trying to get the application for the appt  I didn't tell my dtr yet  I am going to visit my parents in [place]  My dads birthday is on August 3rd so I am planning to take my grandson with me  After meeting my parents we will go to the ball game  I am working on my amends with my ex wife  I want to send her a dozen of yellow roses  Her birthday is on the 35th of this month  I am going to complete step 1 in NA and my sponsor is happy about it  We are going to have the N/A picnic on 8/12  Patient ID: Eduardo Sanchez is a 60 y o  male  HPI- 3:03pm to 3:33= 30 min    Review of Systems      Objective: Sharon Sawyer has been maintaining abstinence from the psychoactive substances  He is trying to maintain or rebuild his relationship   He is focused and goal oriented  He is able to achieve his goals with the support from his family, N/A, sponsor and the psychotherapy  Trishaangel Valdez continues to be motivated regarding relapse prevention, relationship rebuilding, and self care  He denies any stressful issues and reports that he is doing well currently        Physical Exam

## 2021-07-18 DIAGNOSIS — E11.69 TYPE 2 DIABETES MELLITUS WITH OTHER SPECIFIED COMPLICATION, UNSPECIFIED WHETHER LONG TERM INSULIN USE (HCC): ICD-10-CM

## 2021-07-19 RX ORDER — GABAPENTIN 300 MG/1
CAPSULE ORAL
Qty: 180 CAPSULE | Refills: 2 | Status: SHIPPED | OUTPATIENT
Start: 2021-07-19 | End: 2021-10-21

## 2021-07-23 ENCOUNTER — TELEPHONE (OUTPATIENT)
Dept: BEHAVIORAL/MENTAL HEALTH CLINIC | Facility: CLINIC | Age: 66
End: 2021-07-23

## 2021-07-23 NOTE — TELEPHONE ENCOUNTER
This therapist contacted Deon Rubio and reminded him about his appt  Deon Rubio  reported that he was going to pick his grand dtr from school at 3:30 pm  Therapist informed him that he could attend the 30 min session  Deon Rubio agreed to join but had trouble connecting with his cell phone  He stated that he doesn't have a laptop   At 3:10pm he requested the therapist to reschedule his appt to 7/30 at 2pm

## 2021-08-27 ENCOUNTER — TELEPHONE (OUTPATIENT)
Dept: BEHAVIORAL/MENTAL HEALTH CLINIC | Facility: CLINIC | Age: 66
End: 2021-08-27

## 2021-08-27 NOTE — TELEPHONE ENCOUNTER
This therapist contacted Lucy Onofre and left a detailed voicemail  He reminded him about his appt at 3pm  However Sherwin didn't show up or call so far

## 2021-09-09 DIAGNOSIS — J45.909 ASTHMA DUE TO ENVIRONMENTAL ALLERGIES: ICD-10-CM

## 2021-09-09 RX ORDER — CETIRIZINE HYDROCHLORIDE 10 MG/1
TABLET ORAL
Qty: 30 TABLET | Refills: 2 | Status: SHIPPED | OUTPATIENT
Start: 2021-09-09 | End: 2021-12-06

## 2021-10-12 DIAGNOSIS — E55.9 VITAMIN D DEFICIENCY: ICD-10-CM

## 2021-10-12 RX ORDER — MELATONIN
Qty: 30 TABLET | Refills: 2 | Status: SHIPPED | OUTPATIENT
Start: 2021-10-12 | End: 2022-01-11

## 2021-10-13 DIAGNOSIS — E11.69 TYPE 2 DIABETES MELLITUS WITH OTHER SPECIFIED COMPLICATION, UNSPECIFIED WHETHER LONG TERM INSULIN USE (HCC): ICD-10-CM

## 2021-10-21 DIAGNOSIS — E11.69 TYPE 2 DIABETES MELLITUS WITH OTHER SPECIFIED COMPLICATION, UNSPECIFIED WHETHER LONG TERM INSULIN USE (HCC): ICD-10-CM

## 2021-10-21 DIAGNOSIS — E11.49 OTHER DIABETIC NEUROLOGICAL COMPLICATION ASSOCIATED WITH TYPE 2 DIABETES MELLITUS (HCC): ICD-10-CM

## 2021-10-21 RX ORDER — GABAPENTIN 300 MG/1
CAPSULE ORAL
Qty: 180 CAPSULE | Refills: 2 | Status: SHIPPED | OUTPATIENT
Start: 2021-10-21 | End: 2021-11-16

## 2021-10-22 ENCOUNTER — TELEPHONE (OUTPATIENT)
Dept: FAMILY MEDICINE CLINIC | Facility: CLINIC | Age: 66
End: 2021-10-22

## 2021-11-16 ENCOUNTER — OFFICE VISIT (OUTPATIENT)
Dept: FAMILY MEDICINE CLINIC | Facility: CLINIC | Age: 66
End: 2021-11-16

## 2021-11-16 VITALS
TEMPERATURE: 97.6 F | BODY MASS INDEX: 27.09 KG/M2 | DIASTOLIC BLOOD PRESSURE: 86 MMHG | OXYGEN SATURATION: 96 % | HEIGHT: 60 IN | HEART RATE: 97 BPM | SYSTOLIC BLOOD PRESSURE: 138 MMHG | RESPIRATION RATE: 18 BRPM | WEIGHT: 138 LBS

## 2021-11-16 DIAGNOSIS — K29.50 CHRONIC GASTRITIS, PRESENCE OF BLEEDING UNSPECIFIED, UNSPECIFIED GASTRITIS TYPE: ICD-10-CM

## 2021-11-16 DIAGNOSIS — E11.69 TYPE 2 DIABETES MELLITUS WITH OTHER SPECIFIED COMPLICATION, UNSPECIFIED WHETHER LONG TERM INSULIN USE (HCC): Primary | ICD-10-CM

## 2021-11-16 DIAGNOSIS — E11.49 OTHER DIABETIC NEUROLOGICAL COMPLICATION ASSOCIATED WITH TYPE 2 DIABETES MELLITUS (HCC): ICD-10-CM

## 2021-11-16 DIAGNOSIS — N52.1 ERECTILE DYSFUNCTION DUE TO DISEASES CLASSIFIED ELSEWHERE: ICD-10-CM

## 2021-11-16 DIAGNOSIS — M21.611 BUNION, RIGHT: ICD-10-CM

## 2021-11-16 LAB — SL AMB POCT HEMOGLOBIN AIC: 11.8 (ref ?–6.5)

## 2021-11-16 PROCEDURE — 99214 OFFICE O/P EST MOD 30 MIN: CPT | Performed by: NURSE PRACTITIONER

## 2021-11-16 PROCEDURE — 83036 HEMOGLOBIN GLYCOSYLATED A1C: CPT | Performed by: NURSE PRACTITIONER

## 2021-11-16 RX ORDER — ATORVASTATIN CALCIUM 10 MG/1
10 TABLET, FILM COATED ORAL DAILY
Qty: 30 TABLET | Refills: 5 | Status: SHIPPED | OUTPATIENT
Start: 2021-11-16 | End: 2022-05-15

## 2021-11-16 RX ORDER — PANTOPRAZOLE SODIUM 40 MG/1
40 TABLET, DELAYED RELEASE ORAL DAILY
Qty: 60 TABLET | Refills: 0 | Status: SHIPPED | OUTPATIENT
Start: 2021-11-16 | End: 2022-01-11

## 2021-11-16 RX ORDER — SILDENAFIL 25 MG/1
25 TABLET, FILM COATED ORAL DAILY PRN
Qty: 10 TABLET | Refills: 0 | Status: SHIPPED | OUTPATIENT
Start: 2021-11-16 | End: 2022-01-10 | Stop reason: SDUPTHER

## 2021-11-16 RX ORDER — GABAPENTIN 600 MG/1
600 TABLET ORAL 3 TIMES DAILY
Qty: 90 TABLET | Refills: 3 | Status: SHIPPED | OUTPATIENT
Start: 2021-11-16 | End: 2022-03-07

## 2021-11-17 ENCOUNTER — TELEPHONE (OUTPATIENT)
Dept: FAMILY MEDICINE CLINIC | Facility: CLINIC | Age: 66
End: 2021-11-17

## 2021-11-17 PROBLEM — N52.1 ERECTILE DYSFUNCTION DUE TO DISEASES CLASSIFIED ELSEWHERE: Status: ACTIVE | Noted: 2021-11-17

## 2021-11-17 PROBLEM — M21.611 BUNION, RIGHT: Status: ACTIVE | Noted: 2021-11-17

## 2021-11-19 ENCOUNTER — PATIENT OUTREACH (OUTPATIENT)
Dept: FAMILY MEDICINE CLINIC | Facility: CLINIC | Age: 66
End: 2021-11-19

## 2021-11-22 ENCOUNTER — PATIENT OUTREACH (OUTPATIENT)
Dept: FAMILY MEDICINE CLINIC | Facility: CLINIC | Age: 66
End: 2021-11-22

## 2021-11-30 ENCOUNTER — PATIENT OUTREACH (OUTPATIENT)
Dept: FAMILY MEDICINE CLINIC | Facility: CLINIC | Age: 66
End: 2021-11-30

## 2021-12-02 ENCOUNTER — TELEPHONE (OUTPATIENT)
Dept: FAMILY MEDICINE CLINIC | Facility: CLINIC | Age: 66
End: 2021-12-02

## 2021-12-02 DIAGNOSIS — E11.69 TYPE 2 DIABETES MELLITUS WITH OTHER SPECIFIED COMPLICATION, UNSPECIFIED WHETHER LONG TERM INSULIN USE (HCC): ICD-10-CM

## 2021-12-02 DIAGNOSIS — K29.50 CHRONIC GASTRITIS, PRESENCE OF BLEEDING UNSPECIFIED, UNSPECIFIED GASTRITIS TYPE: Primary | ICD-10-CM

## 2021-12-06 ENCOUNTER — TELEPHONE (OUTPATIENT)
Dept: ENDOCRINOLOGY | Facility: CLINIC | Age: 66
End: 2021-12-06

## 2021-12-06 ENCOUNTER — PATIENT OUTREACH (OUTPATIENT)
Dept: FAMILY MEDICINE CLINIC | Facility: CLINIC | Age: 66
End: 2021-12-06

## 2021-12-06 DIAGNOSIS — J45.909 ASTHMA DUE TO ENVIRONMENTAL ALLERGIES: ICD-10-CM

## 2021-12-06 RX ORDER — CETIRIZINE HYDROCHLORIDE 10 MG/1
TABLET ORAL
Qty: 30 TABLET | Refills: 2 | Status: SHIPPED | OUTPATIENT
Start: 2021-12-06 | End: 2022-01-18

## 2021-12-27 ENCOUNTER — PATIENT OUTREACH (OUTPATIENT)
Dept: FAMILY MEDICINE CLINIC | Facility: CLINIC | Age: 66
End: 2021-12-27

## 2021-12-27 ENCOUNTER — OFFICE VISIT (OUTPATIENT)
Dept: PODIATRY | Facility: CLINIC | Age: 66
End: 2021-12-27
Payer: MEDICARE

## 2021-12-27 VITALS
HEIGHT: 60 IN | SYSTOLIC BLOOD PRESSURE: 130 MMHG | WEIGHT: 132 LBS | BODY MASS INDEX: 25.91 KG/M2 | DIASTOLIC BLOOD PRESSURE: 80 MMHG

## 2021-12-27 DIAGNOSIS — F17.200 SMOKER: ICD-10-CM

## 2021-12-27 DIAGNOSIS — E11.69 TYPE 2 DIABETES MELLITUS WITH OTHER SPECIFIED COMPLICATION, UNSPECIFIED WHETHER LONG TERM INSULIN USE (HCC): ICD-10-CM

## 2021-12-27 DIAGNOSIS — M21.611 BUNION, RIGHT: Primary | ICD-10-CM

## 2021-12-27 PROCEDURE — 99203 OFFICE O/P NEW LOW 30 MIN: CPT | Performed by: PODIATRIST

## 2022-01-05 ENCOUNTER — PATIENT OUTREACH (OUTPATIENT)
Dept: FAMILY MEDICINE CLINIC | Facility: CLINIC | Age: 67
End: 2022-01-05

## 2022-01-05 NOTE — PROGRESS NOTES
I spoke with the patient who inquired about medication refills  The patient stated he prefers Levemir pens and not the vial; will send note to PCP  He also notes he does not have a CGM but would like one  The patient states he has been taking his medications and watching his diet (but states he eats anything in moderation)  I reviewed his diabetic meds with him  He reports his fasting sugar today of 179; yesterday 183 and 2 days ago 175  The patient admits he has food at home but is lazy and does not want to cook for himself  He states 2022 will be a new year of him taking better care of himself  He reports his diet can consist of oatmeal for B; hot dogs for L; frozen meals for D  I will continue educating on healthier options  The patient states he needs test strips  I called him to ask which glucometer he has since there is none noted in the chart; I am still waiting for a return call  I will continue to follow

## 2022-01-06 ENCOUNTER — PATIENT OUTREACH (OUTPATIENT)
Dept: FAMILY MEDICINE CLINIC | Facility: CLINIC | Age: 67
End: 2022-01-06

## 2022-01-06 NOTE — PROGRESS NOTES
I called the patient to inform him the Levemir pens were ordered  He was aware as his pharmacy told him they were out of stock  The patient will call other local [facility] pharmacies to see if they have them in stock  The patient states he has a Freestyle Freedom Light glucometer; will send note to PCP to order strips  I informed the patient about the CGM stating it would be easier to be covered through Endo  Patient states he has an appointment scheduled in April  The patient reported his fasting sugar this morning was 284  He admitted to taking his medications as well as another dose of Levemir which dropped his level to 31  He states he felt faint and drank orange juice, ate oatmeal and felt better  I educated him on the symptoms of hypoglycemia and to take his insulin only as prescribed  He stated he will not do that again  I will continue to follow

## 2022-01-06 NOTE — PROGRESS NOTES
The patient called stating he contacted his pharmacy and was told they were not out of Levemir pens but they had to be ordered and will be ready for  this afternoon >3pm

## 2022-01-07 DIAGNOSIS — E11.69 TYPE 2 DIABETES MELLITUS WITH OTHER SPECIFIED COMPLICATION, UNSPECIFIED WHETHER LONG TERM INSULIN USE (HCC): ICD-10-CM

## 2022-01-09 DIAGNOSIS — E55.9 VITAMIN D DEFICIENCY: ICD-10-CM

## 2022-01-09 DIAGNOSIS — K29.50 CHRONIC GASTRITIS, PRESENCE OF BLEEDING UNSPECIFIED, UNSPECIFIED GASTRITIS TYPE: ICD-10-CM

## 2022-01-10 ENCOUNTER — PATIENT OUTREACH (OUTPATIENT)
Dept: FAMILY MEDICINE CLINIC | Facility: CLINIC | Age: 67
End: 2022-01-10

## 2022-01-10 DIAGNOSIS — N52.1 ERECTILE DYSFUNCTION DUE TO DISEASES CLASSIFIED ELSEWHERE: ICD-10-CM

## 2022-01-10 RX ORDER — SILDENAFIL 25 MG/1
25 TABLET, FILM COATED ORAL DAILY PRN
Qty: 10 TABLET | Refills: 0 | Status: SHIPPED | OUTPATIENT
Start: 2022-01-10

## 2022-01-10 NOTE — PROGRESS NOTES
I returned the patient's call  He states he did not receive the refill for his test strips; will send note to PCP  The patient also needs another medication refill which I will submit

## 2022-01-11 ENCOUNTER — TELEPHONE (OUTPATIENT)
Dept: FAMILY MEDICINE CLINIC | Facility: CLINIC | Age: 67
End: 2022-01-11

## 2022-01-11 RX ORDER — MELATONIN
Qty: 30 TABLET | Refills: 2 | Status: SHIPPED | OUTPATIENT
Start: 2022-01-11

## 2022-01-11 RX ORDER — PANTOPRAZOLE SODIUM 40 MG/1
TABLET, DELAYED RELEASE ORAL
Qty: 60 TABLET | Refills: 0 | Status: SHIPPED | OUTPATIENT
Start: 2022-01-11 | End: 2022-02-01

## 2022-01-11 NOTE — TELEPHONE ENCOUNTER
PCP SIGNATURE NEEDED FOR FREESTYLE YIN  FORM RECEIVED VIA FAX AND PLACED IN PCP FOLDER TO BE DELIVERED AT ASSIGNED TIMES

## 2022-01-12 ENCOUNTER — PATIENT OUTREACH (OUTPATIENT)
Dept: FAMILY MEDICINE CLINIC | Facility: CLINIC | Age: 67
End: 2022-01-12

## 2022-01-12 NOTE — PROGRESS NOTES
I returned the patient's call  He states he has only 6 test strips remaining  He stated the pharmacy needs an authorization to get the strips covered  I informed him a form was received which needs to signed by the PCP and faxed  I will continue to follow

## 2022-01-13 ENCOUNTER — PATIENT OUTREACH (OUTPATIENT)
Dept: FAMILY MEDICINE CLINIC | Facility: CLINIC | Age: 67
End: 2022-01-13

## 2022-01-13 NOTE — PROGRESS NOTES
Can you call the pt to schedule an appt regarding his sleep issues? Appt needed before meds can be rx'd  Thank you

## 2022-01-13 NOTE — PROGRESS NOTES
I returned the patient's call  He states the pharmacy told him sensors were available for  but they were too costly for the patient and he refused them  The patient is still waiting for his test strips; will send PCP another note  The patient reports he has not been sleeping  He states he will go to bed ~11pm or midnight and wakes up about 2:30  He notes when he was in treatment he was given trazodone which helped and is requesting this medication; will send note to PCP  I will continue to follow

## 2022-01-18 ENCOUNTER — OFFICE VISIT (OUTPATIENT)
Dept: FAMILY MEDICINE CLINIC | Facility: CLINIC | Age: 67
End: 2022-01-18

## 2022-01-18 VITALS
SYSTOLIC BLOOD PRESSURE: 132 MMHG | HEIGHT: 60 IN | DIASTOLIC BLOOD PRESSURE: 80 MMHG | HEART RATE: 111 BPM | OXYGEN SATURATION: 98 % | BODY MASS INDEX: 26.7 KG/M2 | WEIGHT: 136 LBS | RESPIRATION RATE: 16 BRPM | TEMPERATURE: 98.2 F

## 2022-01-18 DIAGNOSIS — F11.11 HISTORY OF HEROIN ABUSE (HCC): ICD-10-CM

## 2022-01-18 DIAGNOSIS — K29.50 CHRONIC GASTRITIS, PRESENCE OF BLEEDING UNSPECIFIED, UNSPECIFIED GASTRITIS TYPE: ICD-10-CM

## 2022-01-18 DIAGNOSIS — E11.69 TYPE 2 DIABETES MELLITUS WITH OTHER SPECIFIED COMPLICATION, WITH LONG-TERM CURRENT USE OF INSULIN (HCC): Primary | ICD-10-CM

## 2022-01-18 DIAGNOSIS — Z79.4 TYPE 2 DIABETES MELLITUS WITH OTHER SPECIFIED COMPLICATION, WITH LONG-TERM CURRENT USE OF INSULIN (HCC): Primary | ICD-10-CM

## 2022-01-18 DIAGNOSIS — E11.69 TYPE 2 DIABETES MELLITUS WITH OTHER SPECIFIED COMPLICATION, UNSPECIFIED WHETHER LONG TERM INSULIN USE (HCC): ICD-10-CM

## 2022-01-18 LAB — SL AMB POCT HEMOGLOBIN AIC: 10.8 (ref ?–6.5)

## 2022-01-18 PROCEDURE — 99214 OFFICE O/P EST MOD 30 MIN: CPT | Performed by: NURSE PRACTITIONER

## 2022-01-18 PROCEDURE — 83036 HEMOGLOBIN GLYCOSYLATED A1C: CPT | Performed by: NURSE PRACTITIONER

## 2022-01-18 RX ORDER — SUCRALFATE 1 G/1
1 TABLET ORAL 4 TIMES DAILY
Qty: 90 TABLET | Refills: 2 | Status: SHIPPED | OUTPATIENT
Start: 2022-01-18 | End: 2022-03-21

## 2022-01-18 NOTE — PROGRESS NOTES
Assessment/Plan:    Problem List Items Addressed This Visit        Digestive    Chronic gastritis     Stable on 20mg protonix, continue same  Gerd diet reviewed  Relevant Medications    sucralfate (CARAFATE) 1 g tablet    Other Relevant Orders    Ambulatory Referral to Gastroenterology       Endocrine    Type 2 diabetes mellitus with other specified complication ([facility] [address] ) - Primary       Lab Results   Component Value Date    HGBA1C 10 8 (A) 01/18/2022    HGBA1C 11 8 (A) 11/16/2021    HGBA1C 8 6 (H) 04/03/2021 ·   he's starting to move back in the right direction as evidenced by the BS log above, 1 point drop in 2 months time  · He attributes this to stricture use of his insulin  · He is to establish with Endo in April, reviewed diet and exercise goals today, kari Vallecillo is still pending insurance review  Relevant Orders    POCT hemoglobin A1c (Completed)    Hemoglobin A1C       Other    [facility]     Next week will be one-year clean, commended pt on success  He has good support system in place to continue sobriety  Return in about 3 months (around 4/18/2022) for Recheck DM - make sure after 4/19 casey that's when he sees Endo  A chart review was performed and previous primary care visit notes were reviewed  All applicable imaging studies were reviewed and images were reviewed personally  All applicable laboratory studies were reviewed personally  Care everywhere review was performed if  available and all pertinent notes were reviewed  Subjective:     HPI: Deisy Felton is a 73 y o  male who  has no past medical history on file  who presented to the office today for diabetes follow-up  Patient states since the beginning of the new year after having a serious conversation with his daughter, he has renewed his efforts at getting his diabetes under control  After that he has started taking his blood sugar twice daily as copied into the chart above    He has also been strictly compliant with his 20 units of Levemir daily, Januvia and metformin  He does continue to have GI distress however he states any epigastric pain has resolved and he remains compliant with his Protonix  He does complain of bloating, early satiety, loose stools  He was referred to GI but did not follow-up with that appointment, he is asking for another referral   He wants to work on quitting smoking and continues to try to do so on his own  He has no other health concerns at this time  Although the appointment chief complaint was listed as insomnia he states that this has resolved      The following portions of the patient's history were reviewed and updated as appropriate: allergies, current medications, past family history, past medical history, past social history, past surgical history, and problem list     Current Outpatient Medications on File Prior to Visit   Medication Sig Dispense Refill    Alcohol Swabs (Alcohol Pads) 70 % PADS Use daily in the early morning 100 each 5    aspirin (ECOTRIN LOW STRENGTH) 81 mg EC tablet Take 1 tablet (81 mg total) by mouth daily 90 tablet 2    atorvastatin (LIPITOR) 10 mg tablet Take 1 tablet (10 mg total) by mouth daily 30 tablet 5    cholecalciferol (VITAMIN D3) 1,000 units tablet take 1 tablet by mouth once daily 30 tablet 2    Continuous Blood Gluc  (FreeStyle Angella 14 Day [place]) AYLA Use 1 Device continuous (Patient not taking: Reported on 4/15/2021) 1 Device 0    Continuous Blood Gluc Sensor (FreeStyle Angella 14 Day Sensor) MISC Use 1 Device continuous (Patient not taking: Reported on 4/15/2021) 1 each 0    Diclofenac Sodium (VOLTAREN) 1 % Apply 2 g topically 4 (four) times a day 2 g 2    gabapentin (Neurontin) 600 MG tablet Take 1 tablet (600 mg total) by mouth 3 (three) times a day 90 tablet 3    glucose blood (FREESTYLE LITE) test strip Check blood sugar 3 times a day 100 each 11    glucose monitoring kit (FREESTYLE) monitoring kit Use 1 each daily in the early morning 1 each 0    insulin detemir (Levemir FlexTouch) 100 Units/mL injection pen Inject 20 Units under the skin daily at bedtime 15 mL 10    Lancets (freestyle) lancets Use as instructed 100 each 5    metFORMIN (GLUCOPHAGE) 1000 MG tablet take 1 tablet by mouth twice a day with food 180 tablet 0    ondansetron (ZOFRAN-ODT) 4 mg disintegrating tablet Take 4 mg by mouth every 6 (six) hours as needed for nausea or vomiting (Patient not taking: Reported on 12/27/2021 )      pantoprazole (PROTONIX) 40 mg tablet take 1 tablet by mouth once daily 60 tablet 0    sildenafil (VIAGRA) 25 MG tablet Take 1 tablet (25 mg total) by mouth daily as needed for erectile dysfunction 10 tablet 0    sitaGLIPtin (JANUVIA) 25 mg tablet Take 1 tablet (25 mg total) by mouth daily 30 tablet 5     No current facility-administered medications on file prior to visit  Review of Systems   Constitutional: Negative for chills and fever  HENT: Negative for ear pain and sore throat  Eyes: Negative for pain and visual disturbance  Respiratory: Negative for cough and shortness of breath  Cardiovascular: Negative for chest pain and palpitations  Gastrointestinal: Positive for abdominal distention and nausea  Negative for abdominal pain and vomiting  Gerd symptoms  Genitourinary: Negative for dysuria and hematuria  Musculoskeletal: Negative for arthralgias and back pain  Skin: Negative for color change and rash  Neurological: Negative for seizures and syncope  Bilateral lower leg peripheral neuropathy   Psychiatric/Behavioral: Negative for agitation, dysphoric mood, self-injury and suicidal ideas  The patient is not nervous/anxious  All other systems reviewed and are negative              Objective:    /80 (BP Location: Right arm, Patient Position: Sitting, Cuff Size: Standard)   Pulse (!) 111   Temp 98 2 °F (36 8 °C)   Resp 16   Ht 4' 11" (1 499 m) Wt 61 7 kg (136 lb)   SpO2 98%   BMI 27 47 kg/m²     Physical Exam  Constitutional:       Appearance: Normal appearance  HENT:      Head: Normocephalic  Right Ear: Tympanic membrane, ear canal and external ear normal  There is no impacted cerumen  Left Ear: Tympanic membrane, ear canal and external ear normal  There is no impacted cerumen  Nose: Nose normal       Mouth/Throat:      Mouth: Mucous membranes are moist    Eyes:      Extraocular Movements: Extraocular movements intact  Pupils: Pupils are equal, round, and reactive to light  Cardiovascular:      Rate and Rhythm: Normal rate and regular rhythm  Pulses: Normal pulses  Dorsalis pedis pulses are 2+ on the right side and 2+ on the left side  Heart sounds: Normal heart sounds  Pulmonary:      Effort: Pulmonary effort is normal       Breath sounds: Normal breath sounds  Abdominal:      General: Bowel sounds are normal       Palpations: Abdomen is soft  Musculoskeletal:         General: No tenderness or signs of injury  Normal range of motion  Cervical back: Normal range of motion  Right lower leg: No edema  Left lower leg: No edema  Right foot: Bunion present  Comments: Maintains sensation to both legs/ feet despite neuropathy  Feet:      Right foot:      Skin integrity: No ulcer, skin breakdown, erythema, warmth, callus or dry skin  Left foot:      Skin integrity: No ulcer, skin breakdown, erythema, warmth, callus or dry skin  Skin:     General: Skin is warm and dry  Capillary Refill: Capillary refill takes less than 2 seconds  Findings: No bruising, lesion or rash  Neurological:      General: No focal deficit present  Mental Status: He is alert and oriented to person, place, and time  Psychiatric:         Attention and Perception: Attention normal          Mood and Affect: Affect is tearful           Speech: Speech normal          Behavior: Behavior normal          Thought Content: Thought content normal          Cognition and Memory: Cognition normal          Judgment: Judgment normal          AYLA Julian  01/19/22  1:09 PM    There are no Patient Instructions on file for this visit

## 2022-01-18 NOTE — TELEPHONE ENCOUNTER
Any update on the below form? PCP SIGNATURE NEEDED FOR FREESTYLE YIN FORM RECEIVED VIA FAX AND PLACED IN PCP FOLDER TO BE DELIVERED AT ASSIGNED TIMES

## 2022-01-18 NOTE — ASSESSMENT & PLAN NOTE
Lab Results   Component Value Date    HGBA1C 10 8 (A) 01/18/2022    HGBA1C 11 8 (A) 11/16/2021    HGBA1C 8 6 (H) 04/03/2021   · he's starting to move back in the right direction as evidenced by the BS log above, 1 point drop in 2 months time  · He attributes this to stricture use of his insulin  · He is to establish with Endo in April, reviewed diet and exercise goals today, kari Du is still pending insurance review

## 2022-01-19 ENCOUNTER — PATIENT OUTREACH (OUTPATIENT)
Dept: FAMILY MEDICINE CLINIC | Facility: CLINIC | Age: 67
End: 2022-01-19

## 2022-01-19 NOTE — PROGRESS NOTES
As of right now, there's nothing in my box  I'll keep an eye out and complete it as soon as I have it

## 2022-01-19 NOTE — ASSESSMENT & PLAN NOTE
Next week will be one-year clean, commended pt on success  He has good support system in place to continue sobriety

## 2022-01-19 NOTE — PROGRESS NOTES
I received another call from the patient stating he still has not received his test strips  I called the pharmacy and was told a form needs to be completed for both test strips and lancets to be covered; note sent to PCP  Pharmacy was faxing the form now and asked for it to be returned today  I called the patient to update him and he stated he used his last strip yesterday  He noted he will only take metformin if he cannot check his blood sugar

## 2022-01-19 NOTE — PROGRESS NOTES
I received a message on my voicemail from the patient stating he forgot to ask for smoking cessation patches at his appointment yesterday; will send note to PCP

## 2022-01-21 ENCOUNTER — PATIENT OUTREACH (OUTPATIENT)
Dept: FAMILY MEDICINE CLINIC | Facility: CLINIC | Age: 67
End: 2022-01-21

## 2022-01-21 NOTE — PROGRESS NOTES
I called the pharmacy to see if they faxed the form that the PCP needed to complete  They could not find that the form was faxed  I asked them to fax it to the  fax machine to expedite this process  I called the patient to update him and he was very understanding  I explained to him some things are out of our control but we will do the best we can in getting this issue resolved and he was thankful

## 2022-01-24 ENCOUNTER — TELEPHONE (OUTPATIENT)
Dept: FAMILY MEDICINE CLINIC | Facility: CLINIC | Age: 67
End: 2022-01-24

## 2022-01-24 ENCOUNTER — PATIENT OUTREACH (OUTPATIENT)
Dept: FAMILY MEDICINE CLINIC | Facility: CLINIC | Age: 67
End: 2022-01-24

## 2022-01-24 NOTE — TELEPHONE ENCOUNTER
Form was originally faxed on 1/18/22 and has been re faxed today 1/24/22  [facility] [employer] has been informed form has been faxed twice

## 2022-01-24 NOTE — TELEPHONE ENCOUNTER
The form that was faxed 1/18 and 1/24 was for the [facility] and not for the test strips  The form was received today for PCP to complete

## 2022-01-24 NOTE — PROGRESS NOTES
I called [facility] since the fax that was to be received 1/21 did not arrive  I was told the form will be faxed over this morning for the PCP to complete  Update: Form was faxed and will be handed to PCP to complete

## 2022-01-24 NOTE — TELEPHONE ENCOUNTER
----- Message from Kathleen Becker RN sent at 1/21/2022  2:06 PM EST -----  Regarding: RE: why strips/lancets aren't being covered  I just called the pharmacy to have the re-fax the form AGAIN! It SHOULD be arriving this afternoon to the  fax in order to expedite this  Thank you!  ----- Message -----  From: AYLA Allen  Sent: 1/21/2022   2:04 PM EST  To: Kathleen Becker RN, Noy Olinda  Subject: FW: why strips/lancets aren't being covered      Delphine,    Any sign of this prior auth from [facility] for Skylar Slater?    ----- Message -----  From: Анна Gillespie RN  Sent: 1/21/2022   8:29 AM EST  To: AYLA Allen, Kathleen Becker RN  Subject: RE: why strips/lancets aren't being covered      So yes, if we got a DWO from [facility], that needs to be completed and signed before they'll release any diabetic supplies  The patient has Medicare so that's how they have to do it  It's a pain but it's a requirement  I do see almost a month of readings but I'm having a hard time following how he's checking them  I think he would benefit from either a log book or printed off log sheets  ----- Message -----  From: Kathleen Becker RN  Sent: 1/20/2022   3:50 PM EST  To: AYLA Allen, Анна Gillespie RN  Subject: RE: why strips/lancets aren't being covered      I saw the order for the [facility] however [facility] faxed a new order yesterday that needed to be completed for the strips/lancets  Unsure why this is becoming so complicated! Thanks for your help!  ----- Message -----  From: Анна Gillespie RN  Sent: 1/20/2022   3:44 PM EST  To: AYLA Allen, Kathleen Becker RN  Subject: RE: why strips/lancets aren't being covered      There's a copy of a DWO on file [facility] under [facility]  Directly from [employer] it looks like  I've never done that before so I can't really speak to that      I usually go through [facility] and they require 30 consecutive days of blood sugar readings to get insurance to cover a new start CGM  He's also supposed to inject insulin 3 or more times a day  I see once daily only  ----- Message -----  From: AYLA Mejia  Sent: 1/20/2022   3:32 PM EST  To: Gerber Crespo, SENDY, Shaneka Mcmanus RN  Subject: why strips/lancets aren't being covered          Marla Carver,  I'm at a loss as to why his strips/lancets aren't just covered, I don't have any other patients with this issue  I'm Deon Burroughs maybe she can shed light on this  Anyway, no I haven't received any calls  Did his pharmacy mention why these aren't covered? Maybe it's a simple fix like the quantity  Yes, he doesn't need another f/u since I saw him two days ago  February can be cancelled  ----- Message -----  From: Shaneka Mcmanus RN  Sent: 1/20/2022   2:14 PM EST  To: AYLA Mejia! Any chance that form was received/completed for the pt's test strips/lancets? He is calling me EVERY day asking for updates, plus he stated he isn't taking his insulin until he receives his strips  [facility]! Also, do you want his 2/16 appt cx'd? You mentioned seeing him back after 4/19 after he sees Endo  Thank you!   Bo Norris

## 2022-01-24 NOTE — TELEPHONE ENCOUNTER
I got it Laly No, I'll have it done before end of afternoon - sorry I have a critical patient situation

## 2022-01-25 ENCOUNTER — PATIENT OUTREACH (OUTPATIENT)
Dept: FAMILY MEDICINE CLINIC | Facility: CLINIC | Age: 67
End: 2022-01-25

## 2022-01-25 ENCOUNTER — TELEPHONE (OUTPATIENT)
Dept: FAMILY MEDICINE CLINIC | Facility: CLINIC | Age: 67
End: 2022-01-25

## 2022-01-25 NOTE — PROGRESS NOTES
Thank you  [FreeTextEntry1] : labs will be drawn in the office today to further assess her health and to rule out celiac, she was given a kit for the fecal occult blood testing, she is refusing to schedule a screening colonoscopy at this time, dexa was ordered

## 2022-01-25 NOTE — PROGRESS NOTES
I received a call from the patient stating he found test strips that do not fit his glucometer and asked if we could order a new glucometer  I informed him his PCP completed the form and it was faxed to the pharmacy yesterday  I offered to call the pharmacy to check on the status  The pharmacy stated they cannot accept the form since PCP is not accepted by Medicare and it will need to be completed by an attending; will send PCP a note

## 2022-01-25 NOTE — TELEPHONE ENCOUNTER
[facility] FORM RECEIVED VIA FAX AND PLACED IN PCP FOLDER TO BE DELIVERED AT ASSIGNED TIMES        Freestyle Lite test strip

## 2022-01-25 NOTE — PROGRESS NOTES
Jelani Jorgensen, thanks for looking into this  I completed new forms again and placed in Dr Sadaf Bangura bin

## 2022-01-26 ENCOUNTER — PATIENT OUTREACH (OUTPATIENT)
Dept: FAMILY MEDICINE CLINIC | Facility: CLINIC | Age: 67
End: 2022-01-26

## 2022-01-26 NOTE — PROGRESS NOTES
The patient left a message on my voicemail stating he contacted the pharmacy and was told his test strips will be delivered tomorrow  He again was very appreciative of the help

## 2022-01-26 NOTE — PROGRESS NOTES
I messaged Leonila to assist with faxing the [address] which she performed  I called the pharmacy asking if they received the fax for the patient's test strips which they did and will be filling the order  I called the patient to inform him and he was very appreciative  I will follow up next week to review the patient's blood sugars

## 2022-01-31 ENCOUNTER — PATIENT OUTREACH (OUTPATIENT)
Dept: FAMILY MEDICINE CLINIC | Facility: CLINIC | Age: 67
End: 2022-01-31

## 2022-01-31 NOTE — PROGRESS NOTES
I returned the patient's call  He reported his fasting sugar this morning was 227 and at 3pm it was 300  He asked if he should take more insulin  I informed the patient to continue taking his medications as prescribed; I reviewed his meds and he stated he is taking them as instructed  I advised him to keep a strict watch on his diet and he agreed  He stated he will continue checking fasting readings and 2 hour PP  I will follow up in a few days to review more readings

## 2022-02-01 ENCOUNTER — CONSULT (OUTPATIENT)
Dept: GASTROENTEROLOGY | Facility: MEDICAL CENTER | Age: 67
End: 2022-02-01
Payer: COMMERCIAL

## 2022-02-01 VITALS
TEMPERATURE: 97.2 F | DIASTOLIC BLOOD PRESSURE: 87 MMHG | HEART RATE: 83 BPM | WEIGHT: 137.6 LBS | SYSTOLIC BLOOD PRESSURE: 138 MMHG | BODY MASS INDEX: 27.79 KG/M2

## 2022-02-01 DIAGNOSIS — Z12.11 COLON CANCER SCREENING: ICD-10-CM

## 2022-02-01 DIAGNOSIS — R14.0 BLOATING: ICD-10-CM

## 2022-02-01 DIAGNOSIS — R10.13 EPIGASTRIC PAIN: Primary | ICD-10-CM

## 2022-02-01 PROCEDURE — 99204 OFFICE O/P NEW MOD 45 MIN: CPT | Performed by: INTERNAL MEDICINE

## 2022-02-01 RX ORDER — PANTOPRAZOLE SODIUM 40 MG/1
40 TABLET, DELAYED RELEASE ORAL 2 TIMES DAILY
Qty: 60 TABLET | Refills: 0 | Status: SHIPPED | OUTPATIENT
Start: 2022-02-01 | End: 2022-02-26

## 2022-02-01 NOTE — PATIENT INSTRUCTIONS
Scheduled date of Colon/EGD (as of today): 04/12/2022  Physician performing: Dr Stella Brown  Location of procedure: [facility]  Bowel prep reviewed with patient: Miralax/Dulcolax  Instructions reviewed with patient by: MA  Clearances: GALEN diabetic

## 2022-02-01 NOTE — PROGRESS NOTES
[facility] - Outpatient Consultation  Shaw Antonio 73 y o  male MRN: [id number]  Encounter: [id number]          ASSESSMENT AND PLAN:   58-year-old male with history of insulin-dependent type 2 diabetes, hyperlipidemia presents for evaluation  1  Epigastric pain  2  Bloating  He notes chronic abdominal pain and bloating  Differential includes peptic ulcer disease, gastritis  He also has risk factors for gastroparesis given his longstanding type 2 diabetes, elevated A1c and insulin dependence  I recommended he increase his pantoprazole to twice daily  He will obtain gastric emptying study for evaluation of gastroparesis  Diagnostic EGD will be performed in addition to abdominal ultrasound for evaluation of his symptoms  I recommended small, frequent meals in the interim    - Ambulatory Referral to Gastroenterology  - US right upper quadrant; Future  - pantoprazole (PROTONIX) 40 mg tablet; Take 1 tablet (40 mg total) by mouth 2 (two) times a day  Dispense: 60 tablet; Refill: 0  - NM gastric emptying; Future  - EGD; Future      3  Colon cancer screening  He states his last colonoscopy was 20 years ago and was normal   He is overdue for surveillance colonoscopy  I discussed the indication, risk and benefit of colonoscopy for colon cancer screening with him he is agreeable to proceed  - Colonoscopy; Future        ______________________________________________________________________    HPI:  58-year-old male with history of insulin-dependent type 2 diabetes, hyperlipidemia presents for evaluation  He reports 6 months of epigastric and left upper quadrant abdominal pain  This occurs after eating  He no significant abdominal bloating after eating  He denies early satiety  He has no nausea or vomiting  The pain can last several hours and self-resolved  He has been taking pantoprazole 40 mg daily with little to no relief of the symptoms  He denies NSAID use    He reports bowel movements are 3-4 times per day and are soft and pasty  He denies melena or hematochezia  He has no family history of colorectal cancer or other gastrointestinal diseases  He takes no anti-platelet or anticoagulant medications      January 2022 hemoglobin A1c is 10 8  April 2021 hemoglobin is 14, platelets 062, liver enzymes within normal limits  He has no recent abdominal imaging available for review    Prior EGD/colonoscopy :  He reports undergoing a colonoscopy 20 years ago for screening purposes which was normal   He has no prior EGD    REVIEW OF SYSTEMS:    CONSTITUTIONAL: Denies any fever, chills, rigors, and weight loss  HEENT: No earache or tinnitus  Denies hearing loss or visual disturbances  CARDIOVASCULAR: No chest pain or palpitations  RESPIRATORY: Denies any cough, hemoptysis, shortness of breath or dyspnea on exertion  GASTROINTESTINAL: As noted in the History of Present Illness  GENITOURINARY: No problems with urination  Denies any hematuria or dysuria  NEUROLOGIC: No dizziness or vertigo, denies headaches  MUSCULOSKELETAL: Denies any muscle or joint pain  SKIN: Denies skin rashes or itching  ENDOCRINE: Denies excessive thirst  Denies intolerance to heat or cold  PSYCHOSOCIAL: Denies depression or anxiety  Denies any recent memory loss  Historical Information   No past medical history on file  No past surgical history on file  Social History   Social History     Substance and Sexual Activity   Alcohol Use Not Currently     Social History     Substance and Sexual Activity   Drug Use Never     Social History     Tobacco Use   Smoking Status Current Every Day Smoker    Packs/day: 0 50    Types: Cigarettes   Smokeless Tobacco Never Used     No family history on file      Meds/Allergies       Current Outpatient Medications:     Alcohol Swabs (Alcohol Pads) 70 % PADS    aspirin (ECOTRIN LOW STRENGTH) 81 mg EC tablet    atorvastatin (LIPITOR) 10 mg tablet   cholecalciferol (VITAMIN D3) 1,000 units tablet    Continuous Blood Gluc  (FreeStyle Angella 14 Day Brick) AYLA    Continuous Blood Gluc Sensor (FreeStyle Angella 14 Day Sensor) MISC    Diclofenac Sodium (VOLTAREN) 1 %    gabapentin (Neurontin) 600 MG tablet    glucose blood (FREESTYLE LITE) test strip    glucose monitoring kit (FREESTYLE) monitoring kit    insulin detemir (Levemir FlexTouch) 100 Units/mL injection pen    Lancets (freestyle) lancets    metFORMIN (GLUCOPHAGE) 1000 MG tablet    ondansetron (ZOFRAN-ODT) 4 mg disintegrating tablet    pantoprazole (PROTONIX) 40 mg tablet    sildenafil (VIAGRA) 25 MG tablet    sitaGLIPtin (JANUVIA) 25 mg tablet    sucralfate (CARAFATE) 1 g tablet    No Known Allergies        Objective     Blood pressure 138/87, pulse 83, temperature (!) 97 2 °F (36 2 °C), weight 62 4 kg (137 lb 9 6 oz)  There is no height or weight on file to calculate BMI  PHYSICAL EXAM:      General Appearance:   Alert, cooperative, no distress   HEENT:   Normocephalic, atraumatic, anicteric  Neck:  Supple, symmetrical, trachea midline   Lungs:   Clear to auscultation bilaterally; no rales, rhonchi or wheezing; respirations unlabored    Heart[de-identified]   Regular rate and rhythm; no murmur, rub, or gallop  Abdomen:   Soft, mild left upper quadrant tenderness to deep palpation without rebound or guarding, non-distended; normal bowel sounds; no masses, no organomegaly    Genitalia:   Deferred    Rectal:   Deferred    Extremities:  No cyanosis, clubbing or edema    Pulses:  2+ and symmetric    Skin:  No jaundice, rashes, or lesions    Lymph nodes:  No palpable cervical lymphadenopathy        Lab Results:   No visits with results within 1 Day(s) from this visit  Latest known visit with results is:   Office Visit on 01/18/2022   Component Date Value    Hemoglobin A1C 01/18/2022 10 8*         Radiology Results:   No results found

## 2022-02-03 ENCOUNTER — PATIENT OUTREACH (OUTPATIENT)
Dept: FAMILY MEDICINE CLINIC | Facility: CLINIC | Age: 67
End: 2022-02-03

## 2022-02-03 NOTE — PROGRESS NOTES
I called the patient to follow up  He states he has been feeling well  He provides the following blood sugar readings: yesterday's fasting 180/5pm 373/10pm 70 and this morning's fasting was 165  He admits he has not watched his diet this week as he should because he ran out of food and is eating whatever is on hand  I asked if he needs help obtaining food but he states he receives food stamps  He states he will be going to an [employer] convention for 3 days, leaving tomorrow  He notes when he returns he will do his grocery shopping and get back on track  The patient stated he will be needing needles and prefers the shorter ones  I called the pharmacy and was told he has not had needles filled there  They stated the shortest ones they have are the Nancy 4mm; will send note to PCP  The patient noted he had a GI appointment recently and is scheduled for an EGD/colonoscopy in April  I will continue to follow

## 2022-02-04 ENCOUNTER — PATIENT OUTREACH (OUTPATIENT)
Dept: FAMILY MEDICINE CLINIC | Facility: CLINIC | Age: 67
End: 2022-02-04

## 2022-02-04 DIAGNOSIS — Z79.4 TYPE 2 DIABETES MELLITUS WITH OTHER SPECIFIED COMPLICATION, WITH LONG-TERM CURRENT USE OF INSULIN (HCC): ICD-10-CM

## 2022-02-04 DIAGNOSIS — E11.69 TYPE 2 DIABETES MELLITUS WITH OTHER SPECIFIED COMPLICATION, WITH LONG-TERM CURRENT USE OF INSULIN (HCC): ICD-10-CM

## 2022-02-04 RX ORDER — ASPIRIN 81 MG/1
TABLET, COATED ORAL
Qty: 90 TABLET | Refills: 2 | Status: SHIPPED | OUTPATIENT
Start: 2022-02-04

## 2022-02-04 NOTE — PROGRESS NOTES
I received a call from the patient requesting a referral for a sleep study  He states he snores very heavily; will send note to PCP  I informed him the order for his needles were placed and he was thankful  I will continue to follow

## 2022-02-09 ENCOUNTER — RA CDI HCC (OUTPATIENT)
Dept: OTHER | Facility: HOSPITAL | Age: 67
End: 2022-02-09

## 2022-02-09 NOTE — PROGRESS NOTES
[facility]  coding opportunities          Number of diagnosis code(s) already on the problem list added to FYI fla     Chart Reviewed * (Number of) Inbasket suggestions sent to Provider: 1                  Patients insurance company: Medicare           E11 40  e11 65

## 2022-02-15 ENCOUNTER — PATIENT OUTREACH (OUTPATIENT)
Dept: FAMILY MEDICINE CLINIC | Facility: CLINIC | Age: 67
End: 2022-02-15

## 2022-02-15 NOTE — PROGRESS NOTES
I called the patient to remind him of his PCP appointment for tomorrow; he was aware and plans on attending  He noted he is getting ready for a dental appointment this morning  I will continue to follow

## 2022-02-16 ENCOUNTER — PATIENT OUTREACH (OUTPATIENT)
Dept: FAMILY MEDICINE CLINIC | Facility: CLINIC | Age: 67
End: 2022-02-16

## 2022-02-16 ENCOUNTER — OFFICE VISIT (OUTPATIENT)
Dept: FAMILY MEDICINE CLINIC | Facility: CLINIC | Age: 67
End: 2022-02-16

## 2022-02-16 VITALS
OXYGEN SATURATION: 98 % | DIASTOLIC BLOOD PRESSURE: 70 MMHG | BODY MASS INDEX: 27.29 KG/M2 | RESPIRATION RATE: 16 BRPM | TEMPERATURE: 98.6 F | SYSTOLIC BLOOD PRESSURE: 122 MMHG | HEART RATE: 106 BPM | HEIGHT: 60 IN | WEIGHT: 139 LBS

## 2022-02-16 DIAGNOSIS — E11.69 TYPE 2 DIABETES MELLITUS WITH OTHER SPECIFIED COMPLICATION, UNSPECIFIED WHETHER LONG TERM INSULIN USE (HCC): ICD-10-CM

## 2022-02-16 DIAGNOSIS — M21.611 BUNION, RIGHT: Primary | ICD-10-CM

## 2022-02-16 DIAGNOSIS — Z79.4 TYPE 2 DIABETES MELLITUS WITH OTHER SPECIFIED COMPLICATION, WITH LONG-TERM CURRENT USE OF INSULIN (HCC): ICD-10-CM

## 2022-02-16 DIAGNOSIS — Z87.891 PERSONAL HISTORY OF NICOTINE DEPENDENCE: ICD-10-CM

## 2022-02-16 DIAGNOSIS — F17.200 SMOKER: ICD-10-CM

## 2022-02-16 DIAGNOSIS — F41.9 ANXIETY: ICD-10-CM

## 2022-02-16 DIAGNOSIS — E11.69 TYPE 2 DIABETES MELLITUS WITH OTHER SPECIFIED COMPLICATION, WITH LONG-TERM CURRENT USE OF INSULIN (HCC): ICD-10-CM

## 2022-02-16 PROBLEM — L60.0 INGROWN TOENAIL OF BOTH FEET: Status: RESOLVED | Noted: 2021-03-31 | Resolved: 2022-02-16

## 2022-02-16 PROBLEM — M79.622 LEFT UPPER ARM PAIN: Status: RESOLVED | Noted: 2021-05-28 | Resolved: 2022-02-16

## 2022-02-16 PROCEDURE — 99214 OFFICE O/P EST MOD 30 MIN: CPT | Performed by: NURSE PRACTITIONER

## 2022-02-16 RX ORDER — INSULIN DETEMIR 100 [IU]/ML
22 INJECTION, SOLUTION SUBCUTANEOUS EVERY 12 HOURS SCHEDULED
Qty: 15 ML | Refills: 10 | Status: SHIPPED | OUTPATIENT
Start: 2022-02-16 | End: 2022-04-27

## 2022-02-16 RX ORDER — NICOTINE 21 MG/24HR
1 PATCH, TRANSDERMAL 24 HOURS TRANSDERMAL EVERY 24 HOURS
Qty: 28 PATCH | Refills: 2 | Status: SHIPPED | OUTPATIENT
Start: 2022-02-16

## 2022-02-16 RX ORDER — FLASH GLUCOSE SCANNING READER
1 EACH MISCELLANEOUS CONTINUOUS
Qty: 1 EACH | Refills: 0 | Status: SHIPPED | OUTPATIENT
Start: 2022-02-16 | End: 2023-02-16

## 2022-02-16 RX ORDER — FLASH GLUCOSE SENSOR
1 KIT MISCELLANEOUS CONTINUOUS
Qty: 1 EACH | Refills: 0 | Status: SHIPPED | OUTPATIENT
Start: 2022-02-16 | End: 2022-06-16 | Stop reason: SDUPTHER

## 2022-02-16 NOTE — ASSESSMENT & PLAN NOTE
Stable on 20mg protonix, continue same  Gerd diet reviewed    - has upcoming appt with GI/colonoscopy

## 2022-02-16 NOTE — ASSESSMENT & PLAN NOTE
Per podiatry, no surgery until he quits smoking and gets his A1c under control (in process), now wants nicotine patch

## 2022-02-16 NOTE — PROGRESS NOTES
I received a message from the patient stating he would like to review his blood sugars with me since he forgot to take his log to his appointment today  I emailed the patient asking him to send me the logs via email

## 2022-02-16 NOTE — ASSESSMENT & PLAN NOTE
Lab Results   Component Value Date    HGBA1C 10 8 (A) 01/18/2022    HGBA1C 11 8 (A) 11/16/2021    HGBA1C 8 6 (H) 04/03/2021   · he's starting to move back in the right direction as evidenced by the BS log above, 1 point drop in 2 months time  · He is to establish with Endo in April, reviewed diet and exercise goals today, kari Worthy is still pending insurance review  · Today pt endorses strict compliance with above as he did last month  He forgot his logs despite his best effort at trying to remember  I advised he drop off the sheet here or relay this info to our Nurse over phone  · Also, he admits to using Levemir 20 units BID rather than Once daily  He still reports blood sugars above 250 in morning before breakfast, and evening before bedtime  · Increase per ADA guidelines (5?15% or 1?4 units) to 22 units BID

## 2022-02-16 NOTE — ASSESSMENT & PLAN NOTE
No longer on meds or regular therapy  Controlled by lifestyle factors - keeping active, family support, speaks with our complex care RN regularly  Advised to reach out immediately if he ever feels uncontrolled or tempted to substance abuse (recently celebrated 1 year sober)

## 2022-02-23 ENCOUNTER — PATIENT OUTREACH (OUTPATIENT)
Dept: FAMILY MEDICINE CLINIC | Facility: CLINIC | Age: 67
End: 2022-02-23

## 2022-02-23 NOTE — PROGRESS NOTES
I received a call from the patient requesting that I resend the email that I sent last week asking for his blood sugar logs  I emailed the patient and will await his reply

## 2022-02-26 DIAGNOSIS — R10.13 EPIGASTRIC PAIN: ICD-10-CM

## 2022-02-26 RX ORDER — PANTOPRAZOLE SODIUM 40 MG/1
TABLET, DELAYED RELEASE ORAL
Qty: 60 TABLET | Refills: 0 | Status: SHIPPED | OUTPATIENT
Start: 2022-02-26 | End: 2022-03-26

## 2022-03-01 ENCOUNTER — PATIENT OUTREACH (OUTPATIENT)
Dept: FAMILY MEDICINE CLINIC | Facility: CLINIC | Age: 67
End: 2022-03-01

## 2022-03-03 ENCOUNTER — PATIENT OUTREACH (OUTPATIENT)
Dept: FAMILY MEDICINE CLINIC | Facility: CLINIC | Age: 67
End: 2022-03-03

## 2022-03-03 NOTE — PROGRESS NOTES
I called the patient to inform him the prescription was ordered and he can check with his pharmacy this afternoon

## 2022-03-03 NOTE — PROGRESS NOTES
I returned the patient's call  He is again requesting an order be placed for trazodone; will send note to PCP  The patient reports his fasting sugar this morning was 138 which I congratulated him on  He states he is checking his feet daily and notes they are looking "better"  He states he needs to decrease his A1C so he can have surgery on his bunion  The patient was quick to end the call  I will continue to follow

## 2022-03-05 DIAGNOSIS — E11.69 TYPE 2 DIABETES MELLITUS WITH OTHER SPECIFIED COMPLICATION, UNSPECIFIED WHETHER LONG TERM INSULIN USE (HCC): ICD-10-CM

## 2022-03-07 ENCOUNTER — TELEPHONE (OUTPATIENT)
Dept: FAMILY MEDICINE CLINIC | Facility: CLINIC | Age: 67
End: 2022-03-07

## 2022-03-07 RX ORDER — GABAPENTIN 600 MG/1
TABLET ORAL
Qty: 90 TABLET | Refills: 3 | Status: SHIPPED | OUTPATIENT
Start: 2022-03-07 | End: 2022-04-26

## 2022-03-07 NOTE — TELEPHONE ENCOUNTER
M Physician's Order form received by fax on 03/07/22  to be completed by PCP  Copy made and placed in PCP folder  Forms to be delivered to PCP mailbox at assigned time      [address] [id number]

## 2022-03-11 NOTE — TELEPHONE ENCOUNTER
Good luck! Looks like he's only on insulin twice a day and if they don't submit glucose logs, they likely won't get it

## 2022-03-11 NOTE — TELEPHONE ENCOUNTER
Form complete with visit note attached  Just so I have it on record: this is the third different CGM prior authorization form I've done  I know the pt and his daughter are adamant that he receive a CGM  I've told them repeatedly to establish with Endo  He will do so in April  Never-the-less, I'm trying again for the CGM  He's a very pleasant patient and very compliant

## 2022-03-19 DIAGNOSIS — K29.50 CHRONIC GASTRITIS, PRESENCE OF BLEEDING UNSPECIFIED, UNSPECIFIED GASTRITIS TYPE: ICD-10-CM

## 2022-03-21 RX ORDER — SUCRALFATE 1 G/1
TABLET ORAL
Qty: 90 TABLET | Refills: 2 | Status: SHIPPED | OUTPATIENT
Start: 2022-03-21

## 2022-03-23 ENCOUNTER — TELEPHONE (OUTPATIENT)
Dept: FAMILY MEDICINE CLINIC | Facility: CLINIC | Age: 67
End: 2022-03-23

## 2022-03-23 NOTE — TELEPHONE ENCOUNTER
PCP SIGNATURE NEEDED FOR [facility] FORM RECEIVED VIA FAX AND PLACED IN PCP FOLDER TO BE DELIVERED AT ASSIGNED TIMES        Diabetes management office visit confirmation

## 2022-03-26 DIAGNOSIS — R10.13 EPIGASTRIC PAIN: ICD-10-CM

## 2022-03-26 RX ORDER — PANTOPRAZOLE SODIUM 40 MG/1
TABLET, DELAYED RELEASE ORAL
Qty: 60 TABLET | Refills: 0 | Status: SHIPPED | OUTPATIENT
Start: 2022-03-26 | End: 2022-04-26

## 2022-03-28 ENCOUNTER — PATIENT OUTREACH (OUTPATIENT)
Dept: FAMILY MEDICINE CLINIC | Facility: CLINIC | Age: 67
End: 2022-03-28

## 2022-03-28 NOTE — PROGRESS NOTES
I called the patient to follow up  He was happy to report he finally received his Willia Sa 2 days ago and is relieved not to have to stick his finger  He reports his fasting blood sugar this morning was 132 which I congratulated him on  He notes one week he was getting readings >300 but he admits he did a lot of cheating with sweets/pastries, etc   I provided support  The patient notes the trazodone is helping him sleep throughout the night  The patient states he is checking his feet daily and currently denies any skin breakdown  He again notes he has a bunion but needs to lower his A1C before surgery can proceed  The patient states he was starting to exercise, ie, doing push-ups but this bothers his feet  I suggested he use his knees instead to take the pressure off his feet  He did not like that idea but I encouraged him to continue since he is still getting a workout of his arms/chest     The patient states he wants to discontinue a few medications but was not home at the time of this call to check which ones  He states he will email me with the medication names  I will continue to follow  He did request reminder calls for upcoming appointments

## 2022-03-29 ENCOUNTER — PATIENT OUTREACH (OUTPATIENT)
Dept: FAMILY MEDICINE CLINIC | Facility: CLINIC | Age: 67
End: 2022-03-29

## 2022-03-29 NOTE — PROGRESS NOTES
I received a call from the patient stating he needs to change his PCP appointment; will send note to the clerical team   The patient noted he will be emailing me soon regarding the medications he would like discontinued

## 2022-04-06 DIAGNOSIS — E11.69 TYPE 2 DIABETES MELLITUS WITH OTHER SPECIFIED COMPLICATION, UNSPECIFIED WHETHER LONG TERM INSULIN USE (HCC): ICD-10-CM

## 2022-04-11 ENCOUNTER — PATIENT OUTREACH (OUTPATIENT)
Dept: FAMILY MEDICINE CLINIC | Facility: CLINIC | Age: 67
End: 2022-04-11

## 2022-04-11 ENCOUNTER — TELEPHONE (OUTPATIENT)
Dept: GASTROENTEROLOGY | Facility: CLINIC | Age: 67
End: 2022-04-11

## 2022-04-11 NOTE — TELEPHONE ENCOUNTER
TC to pt to r/s procedures - pt had not adhered to prep instructions      Scheduled date of EGD/colonoscopy (as of today):   7/7/22  Physician performing EGD/colonoscopy:  Dr Mracela Mcnamara  Location of EGD/colonoscopy:  [facility]  Desired bowel prep reviewed with patient:  Dulcolax/Miralax; diabetic  Instructions reviewed with patient by:  Pablo Riddle - mailed to pt's home  Clearances:  n/a

## 2022-04-11 NOTE — TELEPHONE ENCOUNTER
Patients GI provider:  Dr Martin Llanes    Number to return call: [phone]    Reason for call: Pt calling to reschedule his procedure       Scheduled procedure/appointment date if applicable: procedure 6/45/73

## 2022-04-11 NOTE — PROGRESS NOTES
I called the patient to remind him of his colonoscopy/EGD appointment for tomorrow  He was unaware  He stated he did not receive a call from the GI office and was unsure about the prep  I urged him to call the GI office since he needs to be careful with his diet today; he stated he will call  The patient noted his average blood sugar for the past week was 222  He was not happy with this and believes this is due to his diet  The patient states he is taking his medications as prescribed  I encouraged him to keep a more strict watch on his diet and he agreed  I will continue to follow

## 2022-04-26 ENCOUNTER — PATIENT OUTREACH (OUTPATIENT)
Dept: FAMILY MEDICINE CLINIC | Facility: CLINIC | Age: 67
End: 2022-04-26

## 2022-04-26 DIAGNOSIS — R10.13 EPIGASTRIC PAIN: ICD-10-CM

## 2022-04-26 RX ORDER — PANTOPRAZOLE SODIUM 40 MG/1
TABLET, DELAYED RELEASE ORAL
Qty: 60 TABLET | Refills: 0 | Status: SHIPPED | OUTPATIENT
Start: 2022-04-26

## 2022-04-26 NOTE — PROGRESS NOTES
I called the patient to remind him of his Endo appointment for tomorrow at 0800 and his PCP appointment on 4/28 at 0940; he was aware of both and plans on attending  The patient reports his  [facility] sent an alarm to him this morning with a low reading of 60  The patient drank orange juice and rested  He notes he is watching his diet and taking his medications as prescribed  He is very happy with the  [facility] system  The patient complains of worsening neuropathy noting his pain is "really bad"; will send note to PCP  I will continue to follow

## 2022-04-27 ENCOUNTER — OFFICE VISIT (OUTPATIENT)
Dept: ENDOCRINOLOGY | Facility: CLINIC | Age: 67
End: 2022-04-27
Payer: COMMERCIAL

## 2022-04-27 VITALS
WEIGHT: 144.6 LBS | BODY MASS INDEX: 29.21 KG/M2 | TEMPERATURE: 97.1 F | DIASTOLIC BLOOD PRESSURE: 86 MMHG | HEART RATE: 86 BPM | SYSTOLIC BLOOD PRESSURE: 142 MMHG

## 2022-04-27 DIAGNOSIS — E78.2 MIXED HYPERLIPIDEMIA: ICD-10-CM

## 2022-04-27 DIAGNOSIS — E55.9 VITAMIN D DEFICIENCY: ICD-10-CM

## 2022-04-27 DIAGNOSIS — Z79.4 TYPE 2 DIABETES MELLITUS WITH DIABETIC NEUROPATHY, WITH LONG-TERM CURRENT USE OF INSULIN (HCC): Primary | ICD-10-CM

## 2022-04-27 DIAGNOSIS — E11.40 TYPE 2 DIABETES MELLITUS WITH DIABETIC NEUROPATHY, WITH LONG-TERM CURRENT USE OF INSULIN (HCC): Primary | ICD-10-CM

## 2022-04-27 PROCEDURE — 99204 OFFICE O/P NEW MOD 45 MIN: CPT | Performed by: INTERNAL MEDICINE

## 2022-04-27 PROCEDURE — 1160F RVW MEDS BY RX/DR IN RCRD: CPT | Performed by: INTERNAL MEDICINE

## 2022-04-27 PROCEDURE — 4004F PT TOBACCO SCREEN RCVD TLK: CPT | Performed by: INTERNAL MEDICINE

## 2022-04-27 RX ORDER — INSULIN DETEMIR 100 [IU]/ML
15 INJECTION, SOLUTION SUBCUTANEOUS
Qty: 15 ML | Refills: 2 | Status: SHIPPED | OUTPATIENT
Start: 2022-04-27

## 2022-04-27 NOTE — PROGRESS NOTES
New Patient Endocrinology Progress Note    Referring Provider  Osvaldo Medellin Pa-c  [address] [facility],  [address] [place] [address]      CC: type 2 DM followup    History of Present Illness:   Ne Elias is a 73 y o  male who is presenting as a new patient to endocrinology for the evaluation of type 2 DM  He was diagnosed over 20 years ago  [facility] hospital He reports complications of diabetes including neuropathy  He denies retinopathy, nephropathy, CVA, CAD    Current regimen: metformin 1000mg BID, levemir 22units once daily at night, januvia 25mg daily  He has been on and off insulin for the last 20 years, more recently has been on levemir for the last year  Skips levemir 2-3x a week if blood sugars are less than 200, he additionally reports that he skips sometimes because he is afraid of needles and pain from injecting in abdomen - we reviewed other sites for injection today  Injects in: belly   Hypoglycemic episodes: yes  H/o of hypoglycemia causing hospitalization:  no  He reports being hospitalized with DM when he passed out once in the middle of the street and blood sugar was noted to be >500  Home blood glucose monitoring: Pt has freestyle reyna, however it is unable to be downloaded in office today  Per , pt has has 7 day average blood sugar 202  14 day above target 53% of the time, in target 47% of the time, low 0% of the time  Diabetes education: has never been to DM education    Healthcare maintenance:  Opthamology: last saw over 6 months  Podiatry: does not follow with podiatry    Patient Active Problem List   Diagnosis    Type 2 diabetes mellitus with other specified complication ([facility] [address] [facility]    Diabetic neuropathy ([facility] [address] [facility]    History of heroin abuse ([facility] [address] [facility]    Anxiety    Chronic gastritis    Prostate cancer screening    Asthma due to environmental allergies    Sleep apnea    Erectile dysfunction due to diseases classified elsewhere    Bunion, right    Smoker      History reviewed   No pertinent past medical history  History reviewed  No pertinent surgical history  History reviewed  No pertinent family history    Social History     Tobacco Use    Smoking status: Current Every Day Smoker     Packs/day: 0 50     Types: Cigarettes    Smokeless tobacco: Never Used   Substance Use Topics    Alcohol use: Not Currently     No Known Allergies      Current Outpatient Medications:     Alcohol Swabs (Alcohol Pads) 70 % PADS, Use daily in the early morning, Disp: 100 each, Rfl: 5    Aspirin Low Dose 81 MG EC tablet, take 1 tablet by mouth once daily, Disp: 90 tablet, Rfl: 2    atorvastatin (LIPITOR) 10 mg tablet, Take 1 tablet (10 mg total) by mouth daily, Disp: 30 tablet, Rfl: 5    cholecalciferol (VITAMIN D3) 1,000 units tablet, take 1 tablet by mouth once daily, Disp: 30 tablet, Rfl: 2    Continuous Blood Gluc  (FreeStyle Angella 14 Day [place]) AYLA, Use 1 Device continuous, Disp: 1 each, Rfl: 0    Continuous Blood Gluc Sensor (FreeStyle Angella 14 Day Sensor) MISC, Use 1 Device continuous, Disp: 1 each, Rfl: 0    Diclofenac Sodium (VOLTAREN) 1 %, Apply 2 g topically 4 (four) times a day, Disp: 2 g, Rfl: 2    gabapentin (NEURONTIN) 800 mg tablet, Take 1 tablet (800 mg total) by mouth 3 (three) times a day, Disp: 90 tablet, Rfl: 5    glucose blood (FREESTYLE LITE) test strip, Check blood sugar 3 times a day, Disp: 100 each, Rfl: 11    glucose monitoring kit (FREESTYLE) monitoring kit, Use 1 each daily in the early morning, Disp: 1 each, Rfl: 0    insulin detemir (Levemir FlexTouch) 100 Units/mL injection pen, Inject 15 Units under the skin daily at bedtime, Disp: 15 mL, Rfl: 2    Insulin Pen Needle 32G X 4 MM MISC, Use daily at bedtime, Disp: 100 each, Rfl: 5    Lancets (freestyle) lancets, Use as instructed, Disp: 100 each, Rfl: 5    metFORMIN (GLUCOPHAGE) 1000 MG tablet, take 1 tablet by mouth twice a day with food, Disp: 180 tablet, Rfl: 0    pantoprazole (PROTONIX) 40 mg tablet, take 1 tablet by mouth twice a day, Disp: 60 tablet, Rfl: 0    sitaGLIPtin (JANUVIA) 100 mg tablet, Take 1 tablet (100 mg total) by mouth daily, Disp: 90 tablet, Rfl: 3    sucralfate (CARAFATE) 1 g tablet, take 1 tablet by mouth four times a day, Disp: 90 tablet, Rfl: 2    traZODone (DESYREL) 50 mg tablet, Take 1 tablet (50 mg total) by mouth daily at bedtime, Disp: 30 tablet, Rfl: 5    nicotine (NICODERM CQ) 21 mg/24 hr TD 24 hr patch, Place 1 patch on the skin every 24 hours (Patient not taking: Reported on 4/27/2022 ), Disp: 28 patch, Rfl: 2    ondansetron (ZOFRAN-ODT) 4 mg disintegrating tablet, Take 4 mg by mouth every 6 (six) hours as needed for nausea or vomiting (Patient not taking: Reported on 12/27/2021 ), Disp: , Rfl:     sildenafil (VIAGRA) 25 MG tablet, Take 1 tablet (25 mg total) by mouth daily as needed for erectile dysfunction (Patient not taking: Reported on 4/27/2022 ), Disp: 10 tablet, Rfl: 0  Review of Systems   Constitutional: Negative for chills, fatigue and fever  HENT: Negative for rhinorrhea and sore throat  Respiratory: Negative for choking, chest tightness, shortness of breath, wheezing and stridor  Cardiovascular: Negative for chest pain, palpitations and leg swelling  Gastrointestinal: Negative for abdominal pain, blood in stool, constipation, diarrhea, nausea and vomiting  Genitourinary: Negative for hematuria  Neurological: Negative for dizziness and light-headedness  All other systems reviewed and are negative  Physical Exam:  Body mass index is 29 21 kg/m²  /86   Pulse 86   Temp (!) 97 1 °F (36 2 °C)   Wt 65 6 kg (144 lb 9 6 oz)   BMI 29 21 kg/m²    Wt Readings from Last 3 Encounters:   04/27/22 65 6 kg (144 lb 9 6 oz)   02/16/22 63 kg (139 lb)   02/01/22 62 4 kg (137 lb 9 6 oz)       Physical Exam  Constitutional:       Appearance: He is well-developed  HENT:      Head: Normocephalic and atraumatic     Eyes:      General:         Right eye: No discharge  Left eye: No discharge  Cardiovascular:      Rate and Rhythm: Normal rate and regular rhythm  Heart sounds: Normal heart sounds  No murmur heard  No friction rub  No gallop  Pulmonary:      Effort: Pulmonary effort is normal  No respiratory distress  Breath sounds: Normal breath sounds  No wheezing or rales  Chest:      Chest wall: No tenderness  Abdominal:      General: Bowel sounds are normal  There is no distension  Palpations: Abdomen is soft  There is no mass  Tenderness: There is no abdominal tenderness  Musculoskeletal:         General: Normal range of motion  Cervical back: Normal range of motion and neck supple  Skin:     General: Skin is warm and dry  Neurological:      Mental Status: He is alert and oriented to person, place, and time  Psychiatric:         Behavior: Behavior normal        Diabetic Foot Exam    Labs:   Lab Results   Component Value Date    HGBA1C 10 8 (A) 01/18/2022       Lab Results   Component Value Date    IHG0SQKQRWPC 0 473 04/03/2021       Lab Results   Component Value Date    CREATININE 0 76 04/03/2021    BUN 12 04/03/2021    K 3 5 04/03/2021     04/03/2021    CO2 28 04/03/2021     eGFR   Date Value Ref Range Status   04/03/2021 111 ml/min/1 73sq m Final     No components found for: [facility]    Lab Results   Component Value Date    HDL 62 04/03/2021    TRIG 106 04/03/2021       Lab Results   Component Value Date    ALT 33 04/03/2021    AST 12 04/03/2021    ALKPHOS 85 04/03/2021       Not on file     Impression:  1  Type 2 diabetes mellitus with diabetic neuropathy, with long-term current use of insulin ([facility] [address] )    2  Mixed hyperlipidemia    3  Vitamin D deficiency           Plan:  Diagnoses and all orders for this visit:    Type 2 diabetes mellitus with diabetic neuropathy, with long-term current use of insulin ([facility])  -     sitaGLIPtin (JANUVIA) 100 mg tablet;  Take 1 tablet (100 mg total) by mouth daily  -     insulin detemir (Levemir FlexTouch) 100 Units/mL injection pen; Inject 15 Units under the skin daily at bedtime  -     HEMOGLOBIN A1C W/ EAG ESTIMATION Lab Collect; Future  -     Microalbumin / creatinine urine ratio Lab Collect; Future  -     Comprehensive metabolic panel Lab Collect; Future  -     Lipid panel Lab Collect Lab Collect; Future  -     Hemoglobin A1C; Future  -     Basic metabolic panel Lab Collect; Future  -     Ambulatory Referral to Diabetic Education; Future  Referral to DM education and nutrition therapy  Increase januvia to 100mg daily  Decrease levemir to 15units at bedtime, encouraged to take all doses  Advised to send in blood sugar logs in 2 weeks for further titration, to obtain labs now and in 3 months prior to next visit  Mixed hyperlipidemia  -     Lipid panel Lab Collect Lab Collect; Future  Recheck lipid panel    Vitamin D deficiency  On 1000units vitamin D daily        Discussed with the patient and all questioned fully answered  He will call me if any problems arise

## 2022-04-28 ENCOUNTER — OFFICE VISIT (OUTPATIENT)
Dept: FAMILY MEDICINE CLINIC | Facility: CLINIC | Age: 67
End: 2022-04-28

## 2022-04-28 VITALS
OXYGEN SATURATION: 97 % | HEIGHT: 60 IN | RESPIRATION RATE: 18 BRPM | WEIGHT: 141 LBS | HEART RATE: 90 BPM | BODY MASS INDEX: 27.68 KG/M2 | DIASTOLIC BLOOD PRESSURE: 80 MMHG | SYSTOLIC BLOOD PRESSURE: 132 MMHG | TEMPERATURE: 98.6 F

## 2022-04-28 DIAGNOSIS — Z79.4 TYPE 2 DIABETES MELLITUS WITH OTHER SPECIFIED COMPLICATION, WITH LONG-TERM CURRENT USE OF INSULIN (HCC): Primary | ICD-10-CM

## 2022-04-28 DIAGNOSIS — M79.2 CHRONIC NEUROPATHIC PAIN: ICD-10-CM

## 2022-04-28 DIAGNOSIS — G89.29 CHRONIC NEUROPATHIC PAIN: ICD-10-CM

## 2022-04-28 DIAGNOSIS — K59.1 FUNCTIONAL DIARRHEA: ICD-10-CM

## 2022-04-28 DIAGNOSIS — E11.69 TYPE 2 DIABETES MELLITUS WITH OTHER SPECIFIED COMPLICATION, WITH LONG-TERM CURRENT USE OF INSULIN (HCC): Primary | ICD-10-CM

## 2022-04-28 PROCEDURE — 99214 OFFICE O/P EST MOD 30 MIN: CPT | Performed by: NURSE PRACTITIONER

## 2022-04-28 PROCEDURE — 3008F BODY MASS INDEX DOCD: CPT | Performed by: INTERNAL MEDICINE

## 2022-04-28 RX ORDER — SACCHAROMYCES BOULARDII 250 MG
250 CAPSULE ORAL 2 TIMES DAILY
Qty: 60 CAPSULE | Refills: 2 | Status: SHIPPED | OUTPATIENT
Start: 2022-04-28

## 2022-04-28 NOTE — PROGRESS NOTES
Assessment/Plan:    Problem List Items Addressed This Visit        Digestive    Functional diarrhea     Likely 2/2 to metformin and increased Januvia  -start probiotics         Relevant Medications    saccharomyces boulardii (FLORASTOR) 250 mg capsule       Endocrine    Type 2 diabetes mellitus with other specified complication ([facility] [address] ) - Primary       Lab Results   Component Value Date    HGBA1C 10 8 (A) 01/18/2022    HGBA1C 11 8 (A) 11/16/2021    HGBA1C 8 6 (H) 04/03/2021 ·   A1c is 10 8%, uncontrolled  · Continue plan per Endo:  · Reduce Levemir to 15 units at bedtime  · Take metformin 1000 mg twice a day  · Increase Januvia to 100 mg daily  · Discussed not to skip Levemir at night, to prevent fluctuation in blood sugars  · Discussed to send log in 2 weeks, by checking blood sugars before and 2 hour after meals  Goal for blood sugar is 80 to 160 mg/dL range  · Discussed long-term complication of uncontrolled diabetes in future  · Discussed hypoglycemia symptoms and treatment  · He will f/u with DM education            Other    Chronic neuropathic pain     Originates mainly in back and upper extremities - may be DM related but Endo told him it is unusual that it would not have originated in feet and therefore may not be solely diabetic neuropathy   -his gabapentin was recently increased due to severe neuro pain, will obtain EMG and refer to neuro  -it will be important to closely watch this pain as it is the reason he had an addiction to heroin (he is now clean for almost 2 years         Relevant Orders    EMG 2 Limb Upper Extremity    Ambulatory Referral to Neurology            No follow-ups on file  A chart review was performed and previous primary care visit notes were reviewed  All applicable imaging studies were reviewed and images were reviewed personally  All applicable laboratory studies were reviewed personally    Care everywhere review was performed if  available and all pertinent notes were reviewed  Subjective:     HPI: Edna Yanes is a 73 y o  male who  has no past medical history on file  who presented to the office today for DM follow up  He finally establish care with Endocrinology, they are lowering his insulin while increasing Januvia  Neuropathic pain of upper extremities continues to be a major issue for him  He says endocrinology told him this is unusual for neuropathic pain to not originate from feet and therefore his upper extremity pain may be related to something else  He does endorse accompanying back pain  Gabapentin does offer some relief however over the course of this last year he has slowly titrated up in his dosage of gabapentin due to uncontrolled pain  Of note he was addicted to heroin and went through rehab 2 years ago in [place]  He has been clean ever since      The following portions of the patient's history were reviewed and updated as appropriate: allergies, current medications, past family history, past medical history, past social history, past surgical history, and problem list     Current Outpatient Medications on File Prior to Visit   Medication Sig Dispense Refill    Alcohol Swabs (Alcohol Pads) 70 % PADS Use daily in the early morning 100 each 5    Aspirin Low Dose 81 MG EC tablet take 1 tablet by mouth once daily 90 tablet 2    atorvastatin (LIPITOR) 10 mg tablet Take 1 tablet (10 mg total) by mouth daily 30 tablet 5    cholecalciferol (VITAMIN D3) 1,000 units tablet take 1 tablet by mouth once daily 30 tablet 2    Continuous Blood Gluc  (FreeStyle Angella 14 Day [place]) AYLA Use 1 Device continuous 1 each 0    Continuous Blood Gluc Sensor (FreeStyle Angella 14 Day Sensor) MISC Use 1 Device continuous 1 each 0    Diclofenac Sodium (VOLTAREN) 1 % Apply 2 g topically 4 (four) times a day 2 g 2    gabapentin (NEURONTIN) 800 mg tablet Take 1 tablet (800 mg total) by mouth 3 (three) times a day 90 tablet 5    glucose blood (FREESTYLE LITE) test strip Check blood sugar 3 times a day 100 each 11    glucose monitoring kit (FREESTYLE) monitoring kit Use 1 each daily in the early morning 1 each 0    insulin detemir (Levemir FlexTouch) 100 Units/mL injection pen Inject 15 Units under the skin daily at bedtime 15 mL 2    Insulin Pen Needle 32G X 4 MM MISC Use daily at bedtime 100 each 5    Lancets (freestyle) lancets Use as instructed 100 each 5    metFORMIN (GLUCOPHAGE) 1000 MG tablet take 1 tablet by mouth twice a day with food 180 tablet 0    nicotine (NICODERM CQ) 21 mg/24 hr TD 24 hr patch Place 1 patch on the skin every 24 hours (Patient not taking: Reported on 4/27/2022 ) 28 patch 2    ondansetron (ZOFRAN-ODT) 4 mg disintegrating tablet Take 4 mg by mouth every 6 (six) hours as needed for nausea or vomiting (Patient not taking: Reported on 12/27/2021 )      pantoprazole (PROTONIX) 40 mg tablet take 1 tablet by mouth twice a day 60 tablet 0    sildenafil (VIAGRA) 25 MG tablet Take 1 tablet (25 mg total) by mouth daily as needed for erectile dysfunction (Patient not taking: Reported on 4/27/2022 ) 10 tablet 0    sitaGLIPtin (JANUVIA) 100 mg tablet Take 1 tablet (100 mg total) by mouth daily 90 tablet 3    sucralfate (CARAFATE) 1 g tablet take 1 tablet by mouth four times a day 90 tablet 2    traZODone (DESYREL) 50 mg tablet Take 1 tablet (50 mg total) by mouth daily at bedtime 30 tablet 5     No current facility-administered medications on file prior to visit  Review of Systems   Constitutional: Negative for chills and fever  HENT: Negative for ear pain and sore throat  Eyes: Negative for pain and visual disturbance  Respiratory: Negative for cough and shortness of breath  Cardiovascular: Negative for chest pain and palpitations  Gastrointestinal: Positive for abdominal distention and nausea  Negative for abdominal pain and vomiting  Gerd symptoms  Genitourinary: Negative for dysuria and hematuria     Musculoskeletal: Negative for arthralgias and back pain  Skin: Negative for color change and rash  Neurological: Negative for seizures and syncope  Bilateral lower leg peripheral neuropathy   Psychiatric/Behavioral: Negative for agitation, dysphoric mood, self-injury and suicidal ideas  The patient is not nervous/anxious  All other systems reviewed and are negative  Objective:    /80 (BP Location: Left arm, Patient Position: Sitting, Cuff Size: Standard)   Pulse 90   Temp 98 6 °F (37 °C) (Temporal)   Resp 18   Ht 4' 11" (1 499 m)   Wt 64 kg (141 lb)   SpO2 97%   BMI 28 48 kg/m²     Physical Exam  Constitutional:       Appearance: Normal appearance  HENT:      Head: Normocephalic  Right Ear: Tympanic membrane, ear canal and external ear normal  There is no impacted cerumen  Left Ear: Tympanic membrane, ear canal and external ear normal  There is no impacted cerumen  Nose: Nose normal       Mouth/Throat:      Mouth: Mucous membranes are moist    Eyes:      Extraocular Movements: Extraocular movements intact  Pupils: Pupils are equal, round, and reactive to light  Cardiovascular:      Rate and Rhythm: Normal rate and regular rhythm  Pulses: Normal pulses  Dorsalis pedis pulses are 2+ on the right side and 2+ on the left side  Heart sounds: Normal heart sounds  Pulmonary:      Effort: Pulmonary effort is normal       Breath sounds: Normal breath sounds  Abdominal:      General: Bowel sounds are normal       Palpations: Abdomen is soft  Musculoskeletal:         General: No tenderness or signs of injury  Normal range of motion  Cervical back: Normal range of motion  Right lower leg: No edema  Left lower leg: No edema  Right foot: Bunion present  Comments: Maintains sensation to both legs/ feet despite neuropathy  Feet:      Right foot:      Skin integrity: No ulcer, skin breakdown, erythema, warmth, callus or dry skin  Left foot:      Skin integrity: No ulcer, skin breakdown, erythema, warmth, callus or dry skin  Skin:     General: Skin is warm and dry  Capillary Refill: Capillary refill takes less than 2 seconds  Findings: No bruising, lesion or rash  Neurological:      General: No focal deficit present  Mental Status: He is alert and oriented to person, place, and time  Psychiatric:         Attention and Perception: Attention normal          Mood and Affect: Affect is tearful  Speech: Speech normal          Behavior: Behavior normal          Thought Content: Thought content normal          Cognition and Memory: Cognition normal          Judgment: Judgment normal          AYLA Johnson  04/29/22  2:24 PM    There are no Patient Instructions on file for this visit

## 2022-04-28 NOTE — ASSESSMENT & PLAN NOTE
Lab Results   Component Value Date    HGBA1C 10 8 (A) 01/18/2022    HGBA1C 11 8 (A) 11/16/2021    HGBA1C 8 6 (H) 04/03/2021   · A1c is 10 8%, uncontrolled  · Continue plan per Endo:  · Reduce Levemir to 15 units at bedtime  · Take metformin 1000 mg twice a day  · Increase Januvia to 100 mg daily  · Discussed not to skip Levemir at night, to prevent fluctuation in blood sugars  · Discussed to send log in 2 weeks, by checking blood sugars before and 2 hour after meals  Goal for blood sugar is 80 to 160 mg/dL range  · Discussed long-term complication of uncontrolled diabetes in future    · Discussed hypoglycemia symptoms and treatment  · He will f/u with DM education

## 2022-04-29 ENCOUNTER — TELEPHONE (OUTPATIENT)
Dept: GASTROENTEROLOGY | Facility: MEDICAL CENTER | Age: 67
End: 2022-04-29

## 2022-04-29 NOTE — ASSESSMENT & PLAN NOTE
Originates mainly in back and upper extremities - may be DM related but Endo told him it is unusual that it would not have originated in feet and therefore may not be solely diabetic neuropathy   -his gabapentin was recently increased due to severe neuro pain, will obtain EMG and refer to neuro  -it will be important to closely watch this pain as it is the reason he had an addiction to heroin (he is now clean for almost 2 years

## 2022-04-29 NOTE — TELEPHONE ENCOUNTER
Rescheduled office FU scheduled 5/2/2022 due to the procedures being rescheduled to 7/7/2022   Patient is now rescheduled to 7/28/2022

## 2022-05-10 ENCOUNTER — PATIENT OUTREACH (OUTPATIENT)
Dept: FAMILY MEDICINE CLINIC | Facility: CLINIC | Age: 67
End: 2022-05-10

## 2022-05-10 NOTE — PROGRESS NOTES
I called the patient to follow up  He states he has been feeling well  He reports his blood sugars have improved greatly with a fasting level of 128 this morning and a reading of 90 last night  He correctly listed the change in diabetic medications of levemir and Januvia  The patient has been checking his feet daily for skin breakdown and currently denies any  The patient is aware of his DM Education appointment for tomorrow and is looking forward to it  He informed me his GI appointment and procedure have been rescheduled to July and August respectively  The patient was thankful for the follow up call and will contact me with any questions or concerns  I will continue to follow

## 2022-05-11 ENCOUNTER — OFFICE VISIT (OUTPATIENT)
Dept: DIABETES SERVICES | Facility: OTHER | Age: 67
End: 2022-05-11
Payer: COMMERCIAL

## 2022-05-11 DIAGNOSIS — E11.69 TYPE 2 DIABETES MELLITUS WITH OTHER SPECIFIED COMPLICATION, UNSPECIFIED WHETHER LONG TERM INSULIN USE (HCC): ICD-10-CM

## 2022-05-11 PROCEDURE — G0108 DIAB MANAGE TRN  PER INDIV: HCPCS | Performed by: DIETITIAN, REGISTERED

## 2022-05-11 NOTE — PROGRESS NOTES
Type 2 Diabetes Class Assessment    HPI: Met with Ulises Peraza for DSME Initial visit  Shayna Tirado has Type 2 Diabetes  Diabetes Assessment  Visit Type: Initial visit  Present at Session: patient   Medical Diagnosis/ICD 10: E11 69  Special Learning Needs: no  Barriers to Learning: no barriers    How do you feel about making lifestyle changes at this time? Ready to control my diabetes   How would you rate your current knowledge of diabetes? fair  How confident are you that will be able to take better control of your diabetes?: good    How long have you had diabetes? >20 years  Have you had diabetes education in the past?: no  Do you have any family members with diabetes?: yes  Do you monitor your blood sugar? yes  Type of blood sugar monitor: Free Style YIN  How old is your meter?: few months  How often do you test your blood sugars?:continuous  Do you keep a written record of your blood sugars? YES  Blood sugar log with patient today and reviewed by educator?: no  Blood Sugar ranges:    Fastin-120  Any financial concerns pertaining to your diabetes supplies, medication or care?:no  Have you ever experienced hypoglycemia?:  yes  Have you ever been hospitalized or gone to the ER due to your blood sugars?: yes  How do you treat low blood sugars?: small glass of juice  How do you treat high blood sugars? Passed out when it happened  Do you wear a Diabetes I D ?: no  Where do you dispose of your sharps (needles,lancetes)?: Currently in the garbage, reviewed safer methods of disposal with patient    Ht Readings from Last 1 Encounters:   22 4' 11" (1 499 m)     Wt Readings from Last 1 Encounters:   22 64 kg (141 lb)       bmi   Weight Change: Yes Pt has gained wt since recovering from drug addiction, postive wt gain    Diet Assessment    Do you follow any special diet presently?: Tries not to eat too much, concerned BG level will go too high  Who cooks at home?:  patient  Who does the grocery shopping?: patient   How frequently do you eat out?: Not assessed at this session    Activity Assessment    Exercise: Not at this time    Lifestyle/Social Assessment    Racial/ethnic group:                                Primary Language: English  Marital Status:   Education Level: High School Graduate   Work status: Retired  Type of job and hours: n/a  Who lives in your household?: lives alone  Who is you primary support person(s): pt has support of adult children, siblings, parents  Feels he has a good support system  Describe your quality of life currently?: fair, continues to improve since staying sober  Any concerns for your safety?:no  Any Religion or cultural practices that may affect your diabetes care: no  Do you have a decrease or loss of hearing?:no  Do you have a decrease or loss of vision?: yes  When was the last time you had an ophthalmology exam?: Within a year, advised to see eye doctor soon  Notices some blurred vision  When was the last time you had dental exam?: not sure  Do you check your feet for cracks, sores, debris?: Yes pt is regularly checking his feet    When was the last time you had podiatry or foot exam?: Last PCP visit 12/27/21  Last flu shot?: n/a  Pneumonia shot?: n/a      Lab Results   Component Value Date    HGBA1C 10 8 (A) 01/18/2022     Lab Results   Component Value Date    HDL 62 04/03/2021    LDLCALC 124 (H) 04/03/2021    TRIG 106 04/03/2021     No results found for: Davey Spangler      The patient's history was reviewed and updated as appropriate: allergies, current medications  Intervention    Diabetes Overview :   Tri was instructed on basic concepts of diabetes, including identifying role of diabetes self management, basic pathophysiology and types of diabetes, A1c and blood sugar targets  Tri has good understanding of material covered  Taking Medications:  Instructed patient on action, side effects, efficacy, prescribed dosage and appropriate timing and frequency of administration of his diabetes medication  Alessandro Nice has good understanding of material covered  Monitoring Blood Sugars  Instructions for Meter Teaching- Patient instructed in the following:  Site selection and skin preparation, Loading strips and lancet device, meter activation, obtaining blood sample, test strip and lancet disposal and recording log book entries  Patient has good understanding of material covered and was able to test their own blood sugar in office today  Testing frequency: Encouraged pair testing  Test sugars before a meal and 2hr after the same meal, rotating between breakfast, lunch, and dinner  Test sugars twice a day (3 days a week, 7 days a week)  Goal Blood Sugars:   Premeal , even better <110  2hr after a meal <180, even better <140  A1C <7%, even better <6 5%  Hypoglycemia: Instructed patient on definition/risk of hypoglycemia, treatment, causes/symptoms, when to notify provider of lows, prevention of hypoglycemia and exercise precautions  Comments: Callie Dockery understanding of hypoglycemia concepts      Physical Activity: Discussed benefits of physical activity to optimize blood glucose control, encouraged activity at patient is physically able  Always consult a physician prior to starting an exercise program   Comments: Sherwin verbalizes understanding of hypoglycemia concepts        Diabetes Education Record  Alessandro Nice received the following handouts: Diabetes and you, 4 Step to Manage Your Diabetes, Planning Healthy Meal, DSMES program brochure  Pt goal is to lower his Ac1 and improve his nutritional intake r/t T2DM  Pt is continuing with diabetes education program, next appt in 1 week        Patient response to instruction    Comprehensionfair  Motivationgood  Expected Compliancegood  Response to Teachback: 100%, demonstrated understanding    Begin Time: 10:30  End Time: 11:30  Referring Provider: Marin Botello    Thank you for referring your patient to [facility], it was a pleasure working with them today  Please feel free to call with any questions or concerns      [address],LDN,[address] [address] [phone]

## 2022-05-18 ENCOUNTER — OFFICE VISIT (OUTPATIENT)
Dept: DIABETES SERVICES | Facility: OTHER | Age: 67
End: 2022-05-18
Payer: COMMERCIAL

## 2022-05-18 DIAGNOSIS — E11.69 TYPE 2 DIABETES MELLITUS WITH OTHER SPECIFIED COMPLICATION, UNSPECIFIED WHETHER LONG TERM INSULIN USE (HCC): Primary | ICD-10-CM

## 2022-05-18 PROCEDURE — G0109 DIAB MANAGE TRN IND/GROUP: HCPCS | Performed by: DIETITIAN, REGISTERED

## 2022-05-18 NOTE — PROGRESS NOTES
Living Well with Diabetes Group Class #1    Yasemin Fong attended the Living Well with Diabetes Group Class #1  Topics Covered in class today include: What is diabetes; Types of Diabetes; How Diabetes is diagnosed; Management skills; the role of exercise in blood sugar managements, Home glucose monitoring, and target ranges  Lay Ontiveros participated in group activities    The patient's history was reviewed and updated as appropriate: allergies, current medications  Lab Results   Component Value Date    HGBA1C 10 8 (A) 01/18/2022       Diabetes Education Record  Lay Ontiveros was provided Living Well with Diabetes Class #1 book  CGM readings show pt BG are returning to range of 140 or lower  Pt is receptive to education and is committed to improving his diabetes control and overall health  Continue with education sessions  Patient response to instruction    Comprehensionfair  Motivationgood  Expected Compliancegood    Begin Time: 11:30am   End Time: 12:30pm  Referring Provider: Shaniqua Burger    Thank you for referring your patient to [facility], it was a pleasure working with them today  Please feel free to call with any questions or concerns      [address],LDN,[address] [address] [phone]

## 2022-06-07 ENCOUNTER — OFFICE VISIT (OUTPATIENT)
Dept: DIABETES SERVICES | Facility: OTHER | Age: 67
End: 2022-06-07
Payer: COMMERCIAL

## 2022-06-07 DIAGNOSIS — E11.69 TYPE 2 DIABETES MELLITUS WITH OTHER SPECIFIED COMPLICATION, UNSPECIFIED WHETHER LONG TERM INSULIN USE (HCC): Primary | ICD-10-CM

## 2022-06-07 PROCEDURE — G0109 DIAB MANAGE TRN IND/GROUP: HCPCS | Performed by: DIETITIAN, REGISTERED

## 2022-06-07 NOTE — PROGRESS NOTES
Living Well with Diabetes Group Class #2    Brea Currie attended the Living Well with Diabetes Group Class #2  During class, Manoj Brown was instructed on the following topics: Macronutrients, Carbohydrate sources, What one serving of carbohydrate equals, effects of diet on blood glucose levels, effect of carbohydrates on blood glucose levels, basics of meal planning: balance, portions, meal times, measurements, reading food labels to determine carbohydrates  Manoj Brown participated in group activities of reading labels together and completing the meal planning activity  Manoj Brown will follow up with class #3  Will call with any questions or concerns prior to next session  The patient's history was reviewed and updated as appropriate: allergies, current medications  Lab Results   Component Value Date    HGBA1C 10 8 (A) 01/18/2022       Diabetes Education Record  Manoj Brown was provided Living Well with Diabetes Class #2 book,portion booklet, detailed education on importance of consistent CHO intake, pt has been experiencing episodes of hypoglycemia  Discussed in detail, appropriate measures to raise BG levels  Pt will log any future hypoglycemic events  F/U in 2 weeks  Patient response to instruction    Xin Mccarthy  Expected Compliancefair    Begin Time: 11:30 am  End Time: 12:30 pm  Referring Provider: Peter Horton    Thank you for referring your patient to [facility], it was a pleasure working with them today  Please feel free to call with any questions or concerns      [address],LDN,[address] [address] [phone]

## 2022-06-09 ENCOUNTER — PATIENT OUTREACH (OUTPATIENT)
Dept: FAMILY MEDICINE CLINIC | Facility: CLINIC | Age: 67
End: 2022-06-09

## 2022-06-09 NOTE — PROGRESS NOTES
I called the patient to follow up  He states he has been doing "good, great"  He reports 85% of his blood sugar readings are <150 which I congratulated him on  He reports his fasting sugar from this morning was 127  The patient notes he has stopped using his insulin for a few weeks and his sugars have remained stable  He states he has been going to DM Education classes and is learning a lot  The patient notes he continues to check his feet daily for skin breakdown and currently denies any  He does report neuropathy of his feet but admits he was not taking the medication as prescribed  I explained to him to take it as directed and he agreed  The patient states his recovery is going well  He will call with any questions or concerns  I reminded him of his Sleep Med appointment 6/14 and he plans on attending  The patient will be due for a PCP appointment; note sent to clerical     I will continue to follow

## 2022-06-14 ENCOUNTER — OFFICE VISIT (OUTPATIENT)
Dept: SLEEP CENTER | Facility: CLINIC | Age: 67
End: 2022-06-14
Payer: COMMERCIAL

## 2022-06-14 VITALS — WEIGHT: 139.2 LBS | HEART RATE: 81 BPM | HEIGHT: 60 IN | BODY MASS INDEX: 27.33 KG/M2 | OXYGEN SATURATION: 97 %

## 2022-06-14 DIAGNOSIS — G47.30 SLEEP APNEA, UNSPECIFIED TYPE: ICD-10-CM

## 2022-06-14 PROCEDURE — 1160F RVW MEDS BY RX/DR IN RCRD: CPT | Performed by: INTERNAL MEDICINE

## 2022-06-14 PROCEDURE — 3008F BODY MASS INDEX DOCD: CPT | Performed by: INTERNAL MEDICINE

## 2022-06-14 PROCEDURE — 99203 OFFICE O/P NEW LOW 30 MIN: CPT | Performed by: INTERNAL MEDICINE

## 2022-06-14 NOTE — PROGRESS NOTES
Review of Systems      Genitourinary need to urinate more than twice a night and difficulty with erection   Cardiology none   Gastrointestinal abdominal pain or cramping that disturb sleep    Neurology awaken with headache, need to move extremities, muscle weakness, numbness/tingling of an extremity, forgetfulness, poor concentration or confusion,  and difficulty with memory   Constitutional fatigue   Integumentary none   Psychiatry anxiety   Musculoskeletal joint pain, muscle aches and back pain   Pulmonary snoring   ENT none   Endocrine frequent urination   Hematological none

## 2022-06-14 NOTE — PROGRESS NOTES
Consultation -  [facility] : 1955  MRN: [id number]      Assessment:  28-year-old male with a past medical history of diabetes, asthma, anxiety, heroine abuse in remission presents with complain of excessive daytime fatigue/tiredness and snoring  His symptoms are consistent with JESSICA  Plan:  - Will order sleep study at this time    Follow up: After sleep study    History of Present Illness:   73 y  o male with a past medical history of diabetes, asthma, anxiety, heroine abuse in remission presents with complain of excessive daytime fatigue and snoring  Patient reports difficulty sleeping/maintaining sleep prior to 2 months ago (requiring his his PCP to prescribe trazodone 50 milligram at bedtime which has greatly improved his symptoms)  Currently, He lays down at 11 p m and falls asleep right away, wakes up at 6:30 a m  Patient sleeps all through the night, admits to snoring, gasping,+apneic episodes during sleep(witnessed by daughter)  He does not feel refreshed upon waking up from sleep, reports taking about half an hour nap every day and feels "slightly rejuvinated afterwards", pt  complains of excessive daytime  Fatigue/tiredness,+ headaches upon waking up from sleep and leg trashing prior to falling asleep, +decreased memory/poor concentration  Patient denies sleep paralysis, cataplexy, hypnagogic/hypnopompic hallucination, he dreams/participate in the dream   Patient denies use of alcohol or recreational drugs at this time, smokes about 5-6 cigarettes a day      Review of Systems      Genitourinary need to urinate more than twice a night and difficulty with erection   Cardiology none   Gastrointestinal abdominal pain or cramping that disturb sleep    Neurology awaken with headache, need to move extremities, muscle weakness, numbness/tingling of an extremity, forgetfulness, poor concentration or confusion,  and difficulty with memory   Constitutional fatigue   Integumentary none Psychiatry anxiety   Musculoskeletal joint pain, muscle aches and back pain   Pulmonary snoring   ENT none   Endocrine frequent urination   Hematological none           I have reviewed and updated the review of systems as necessary    Historical Information    Past Medical History:diabetes, asthma, anxiety, heroine abuse in remission (1 year and 4 months)      Family History: non-contributory    Social History     Socioeconomic History    Marital status: Single     Spouse name: Not on file    Number of children: Not on file    Years of education: Not on file    Highest education level: Not on file   Occupational History    Not on file   Tobacco Use    Smoking status: Current Every Day Smoker     Packs/day: 0 50     Types: Cigarettes    Smokeless tobacco: Never Used   Vaping Use    Vaping Use: Never used   Substance and Sexual Activity    Alcohol use: Not Currently    Drug use: Never    Sexual activity: Not Currently   Other Topics Concern    Not on file   Social History Narrative    Not on file     Social Determinants of Health     Financial Resource Strain: Low Risk     Difficulty of Paying Living Expenses: Not very hard   Food Insecurity: No Food Insecurity    Worried About Running Out of Food in the Last Year: Never true    Ally of Food in the Last Year: Never true   Transportation Needs: No Transportation Needs    Lack of Transportation (Medical): No    Lack of Transportation (Non-Medical):  No   Physical Activity: Not on file   Stress: Not on file   Social Connections: Not on file   Intimate Partner Violence: Not on file   Housing Stability: Not on file         Sleep Schedule: unremarkable    Snoring:  Yes    Witnessed Apnea: Yes    Medications/Allergies:    Current Outpatient Medications:     Alcohol Swabs (Alcohol Pads) 70 % PADS, Use daily in the early morning, Disp: 100 each, Rfl: 5    Aspirin Low Dose 81 MG EC tablet, take 1 tablet by mouth once daily, Disp: 90 tablet, Rfl: 2   cholecalciferol (VITAMIN D3) 1,000 units tablet, take 1 tablet by mouth once daily, Disp: 30 tablet, Rfl: 2    Continuous Blood Gluc  (FreeStyle Angella 14 Day [place]) AYLA, Use 1 Device continuous, Disp: 1 each, Rfl: 0    Continuous Blood Gluc Sensor (FreeStyle Angella 14 Day Sensor) MISC, Use 1 Device continuous, Disp: 1 each, Rfl: 0    Diclofenac Sodium (VOLTAREN) 1 %, Apply 2 g topically 4 (four) times a day, Disp: 2 g, Rfl: 2    gabapentin (NEURONTIN) 800 mg tablet, Take 1 tablet (800 mg total) by mouth 3 (three) times a day, Disp: 90 tablet, Rfl: 5    glucose blood (FREESTYLE LITE) test strip, Check blood sugar 3 times a day, Disp: 100 each, Rfl: 11    glucose monitoring kit (FREESTYLE) monitoring kit, Use 1 each daily in the early morning, Disp: 1 each, Rfl: 0    insulin detemir (Levemir FlexTouch) 100 Units/mL injection pen, Inject 15 Units under the skin daily at bedtime, Disp: 15 mL, Rfl: 2    Insulin Pen Needle 32G X 4 MM MISC, Use daily at bedtime, Disp: 100 each, Rfl: 5    Lancets (freestyle) lancets, Use as instructed, Disp: 100 each, Rfl: 5    metFORMIN (GLUCOPHAGE) 1000 MG tablet, take 1 tablet by mouth twice a day with food, Disp: 180 tablet, Rfl: 0    pantoprazole (PROTONIX) 40 mg tablet, take 1 tablet by mouth twice a day, Disp: 60 tablet, Rfl: 0    saccharomyces boulardii (FLORASTOR) 250 mg capsule, Take 1 capsule (250 mg total) by mouth 2 (two) times a day, Disp: 60 capsule, Rfl: 2    sitaGLIPtin (JANUVIA) 100 mg tablet, Take 1 tablet (100 mg total) by mouth daily, Disp: 90 tablet, Rfl: 3    sucralfate (CARAFATE) 1 g tablet, take 1 tablet by mouth four times a day, Disp: 90 tablet, Rfl: 2    traZODone (DESYREL) 50 mg tablet, Take 1 tablet (50 mg total) by mouth daily at bedtime, Disp: 30 tablet, Rfl: 5    atorvastatin (LIPITOR) 10 mg tablet, Take 1 tablet (10 mg total) by mouth daily, Disp: 30 tablet, Rfl: 5    nicotine (NICODERM CQ) 21 mg/24 hr TD 24 hr patch, Place 1 patch on the skin every 24 hours (Patient not taking: No sig reported), Disp: 28 patch, Rfl: 2    ondansetron (ZOFRAN-ODT) 4 mg disintegrating tablet, Take 4 mg by mouth every 6 (six) hours as needed for nausea or vomiting (Patient not taking: No sig reported), Disp: , Rfl:     sildenafil (VIAGRA) 25 MG tablet, Take 1 tablet (25 mg total) by mouth daily as needed for erectile dysfunction (Patient not taking: No sig reported), Disp: 10 tablet, Rfl: 0        No notes on file                  Objective:    Vital Signs:   Vitals:    06/14/22 1044   Pulse: 81   SpO2: 97%   Weight: 63 1 kg (139 lb 3 2 oz)   Height: 4' 11" (1 499 m)     Neck Circumference: 14 5      [place] Sleepiness Scale: Total score: 4    Review of Systems   Constitutional: Positive for fatigue  Negative for chills and fever  HENT: Negative for ear pain and sore throat  Eyes: Negative for pain and visual disturbance  Respiratory: Negative for cough and shortness of breath  Cardiovascular: Negative for chest pain and palpitations  Gastrointestinal: Negative for abdominal pain and vomiting  Endocrine: Positive for polyuria  Genitourinary: Negative for dysuria and hematuria  Musculoskeletal: Positive for arthralgias, back pain and myalgias  Skin: Negative for color change and rash  Neurological: Negative for seizures and syncope  Psychiatric/Behavioral: The patient is nervous/anxious  All other systems reviewed and are negative      Physical Exam:    General: Alert, appropriate, cooperative    Head: NC/AT, no retrognathia    Nose: No septal deviation, nares not obstructed, mucosa normal    Throat: Airway diminished, tongue base thickened, no tonsils visualized    Neck: Normal, no thyromegaly or lymphadenopathy, no JVD    Heart: RR, normal S1 and S2, no murmurs    Chest: Clear bilaterally    Extremity: No clubbing, cyanosis, no edema    Skin: Warm, dry    Neuro: No motor abnormalities, cranial nerves appear intact    [facility] PGY3    Portions of the record may have been created with voice recognition software  Occasional wrong word or "sound a like" substitutions may have occurred due to the inherent limitations of voice recognition software  Read the chart carefully and recognize, using context, where substitutions have occurred

## 2022-06-16 DIAGNOSIS — E11.69 TYPE 2 DIABETES MELLITUS WITH OTHER SPECIFIED COMPLICATION, UNSPECIFIED WHETHER LONG TERM INSULIN USE (HCC): ICD-10-CM

## 2022-06-16 RX ORDER — FLASH GLUCOSE SENSOR
1 KIT MISCELLANEOUS CONTINUOUS
Qty: 6 EACH | Refills: 1 | Status: SHIPPED | OUTPATIENT
Start: 2022-06-16 | End: 2023-06-16

## 2022-06-20 ENCOUNTER — HOSPITAL ENCOUNTER (OUTPATIENT)
Dept: SLEEP CENTER | Facility: CLINIC | Age: 67
Discharge: HOME | End: 2022-06-20
Payer: COMMERCIAL

## 2022-06-20 DIAGNOSIS — G47.30 SLEEP APNEA, UNSPECIFIED TYPE: ICD-10-CM

## 2022-06-20 PROCEDURE — 95810 POLYSOM 6/> YRS 4/> PARAM: CPT

## 2022-06-21 PROBLEM — G47.33 OSA (OBSTRUCTIVE SLEEP APNEA): Status: ACTIVE | Noted: 2021-05-28

## 2022-06-21 NOTE — PROGRESS NOTES
Sleep Study Documentation    Pre-Sleep Study       Sleep testing procedure explained to patient:YES    Patient napped prior to study:NO    Caffeine:Dayshift worker after 12PM   Caffeine use:NO    Alcohol:Dayshift workers after 5PM: Alcohol use:NO    Typical day for patient:NO       Study Documentation    Sleep Study Indications: Snoring and morning headaches  Sleep Study: Diagnostic   Snore:Severe  Supplemental O2: no      Minimum SaO2 93  Baseline SaO2 97            EKG abnormalities: no     EEG abnormalities: no    Sleep Study Recorded < 2 hours: N/A    Sleep Study Recorded > 2 hours but incomplete study: N/A    Sleep Study Recorded 6 hours but no sleep obtained: NO    Patient classification: retired       Post-Sleep Study    Medication used at bedtime or during sleep study:NO    Patient reports time it took to fall asleep:less than 20 minutes    Patient reports waking up during study:3 or more times  Patient reports returning to sleep in 10 to 30 minutes  Patient reports sleeping 4 to 6 hours with dreaming  Patient reports sleep during study:worse than usual    Patient rated sleepiness: Not sleepy or tired    PAP treatment:no

## 2022-06-23 ENCOUNTER — TELEPHONE (OUTPATIENT)
Dept: SLEEP CENTER | Facility: CLINIC | Age: 67
End: 2022-06-23

## 2022-06-23 ENCOUNTER — TELEPHONE (OUTPATIENT)
Dept: FAMILY MEDICINE CLINIC | Facility: CLINIC | Age: 67
End: 2022-06-23

## 2022-06-23 NOTE — TELEPHONE ENCOUNTER
[facility] / CGM Physician Order  form received on 6/23/22  to be completed by PCP  Copy made and placed in PCP folder  Forms to be delivered to PCP mailbox at assigned time        BAR CODE # P W [id number] 1 9 [phone] 3 0 1 3 2      NOTE: a blank copy has been made and placed at  copy bin, under the letter M

## 2022-06-24 ENCOUNTER — TELEPHONE (OUTPATIENT)
Dept: FAMILY MEDICINE CLINIC | Facility: CLINIC | Age: 67
End: 2022-06-24

## 2022-06-24 NOTE — TELEPHONE ENCOUNTER
Authorization Number: [id number]    Case Number: [id number]     Status: Denied    P2P Eligibility Result: Post-decision options for this case have been exhausted or are not delegated to [facility] via Peer to Peer  You may continue to schedule a Peer to Peer discussion for this case but it will be considered consultative only and the original decision cannot be modified  Service Description: CT Thorax LDCT scrn, w/o contr               You must show that patient have a 20 pack year smoking history

## 2022-06-27 ENCOUNTER — PATIENT OUTREACH (OUTPATIENT)
Dept: FAMILY MEDICINE CLINIC | Facility: CLINIC | Age: 67
End: 2022-06-27

## 2022-06-27 NOTE — PROGRESS NOTES
All the paperwork I received this week was completed and signed by this past Friday, might still be in the fax pile

## 2022-06-27 NOTE — PROGRESS NOTES
I received a message on my voicemail from the patient stating he has not yet received the Kurtz; will send note to PCP

## 2022-06-28 ENCOUNTER — PATIENT OUTREACH (OUTPATIENT)
Dept: FAMILY MEDICINE CLINIC | Facility: CLINIC | Age: 67
End: 2022-06-28

## 2022-06-28 NOTE — PROGRESS NOTES
Chart reviewed  Form for the patient's new CGM was faxed by office staff with confirmation  Email sent to the patient informing of same  I will continue to follow

## 2022-06-29 DIAGNOSIS — G47.33 OSA (OBSTRUCTIVE SLEEP APNEA): Primary | ICD-10-CM

## 2022-07-01 ENCOUNTER — TELEPHONE (OUTPATIENT)
Dept: NEUROLOGY | Facility: CLINIC | Age: 67
End: 2022-07-01

## 2022-07-06 ENCOUNTER — TELEPHONE (OUTPATIENT)
Dept: SLEEP CENTER | Facility: CLINIC | Age: 67
End: 2022-07-06

## 2022-07-06 NOTE — TELEPHONE ENCOUNTER
Returned patient's call and avised sleep study shows moderate to severe JESSICA  Titration study recommended and scheduled for 11/7/2022 in [facility] sleep lab  Instructions for titration study provided, patient verbalized understanding

## 2022-07-06 NOTE — TELEPHONE ENCOUNTER
See earlier note patient scheduled for CPAP study in November but Dr Melisa Fernandez wants patient to start on APAP until the study       Patient will call back to schedule DME set up and compliance appointments

## 2022-07-19 ENCOUNTER — OFFICE VISIT (OUTPATIENT)
Dept: DIABETES SERVICES | Facility: OTHER | Age: 67
End: 2022-07-19
Payer: COMMERCIAL

## 2022-07-19 DIAGNOSIS — E11.69 TYPE 2 DIABETES MELLITUS WITH OTHER SPECIFIED COMPLICATION, UNSPECIFIED WHETHER LONG TERM INSULIN USE (HCC): Primary | ICD-10-CM

## 2022-07-19 PROCEDURE — G0109 DIAB MANAGE TRN IND/GROUP: HCPCS | Performed by: DIETITIAN, REGISTERED

## 2022-07-19 NOTE — PROGRESS NOTES
Living Well with Diabetes Group Class #3    Sriram Encarnacion attended the Living Well with Diabetes Group Class #3  During class, Meryl Aguilar was instructed on the following topics: Oral and injectable medications, short term complications of diabetes, long term complications of diabetes, prevention of complications, foot care, sick day management, stress management, and traveling with diabetes  Meryl Aguilar participated in group activities  Meryl Aguilar will follow up with class #4  Will call with any questions or concerns prior to next session  The patient's history was reviewed and updated as appropriate: allergies, current medications  Lab Results   Component Value Date    HGBA1C 10 8 (A) 01/18/2022       Diabetes Education Record   Medications reviewed, pt is currently not taking Levemir at 15 units at bed  Has stopped taking this >2 months although could not give specific time/date as to when he stopped  BG averages within target range of  >75% of the time after review of glucometer with one low reading over past 90 days  Discussed the importance of reviewing his medication intake with provider at upcoming visit  Indicates taking other diabetes medications as prescribed, Januvia 100mg daily, Metformin 1000mg BID  Patient response to instruction    Comprehensiongood  Motivationfair  Expected Compliancegood    Begin Time: 11:30am  End Time: 12:30 pm  Referring Provider: Livia Reza    Thank you for referring your patient to [facility], it was a pleasure working with them today  Please feel free to call with any questions or concerns      Rekha Saavedra RD,LDN,[address] [address] [phone]

## 2022-07-28 ENCOUNTER — OFFICE VISIT (OUTPATIENT)
Dept: GASTROENTEROLOGY | Facility: MEDICAL CENTER | Age: 67
End: 2022-07-28
Payer: COMMERCIAL

## 2022-07-28 VITALS
BODY MASS INDEX: 27.55 KG/M2 | SYSTOLIC BLOOD PRESSURE: 117 MMHG | TEMPERATURE: 97.6 F | WEIGHT: 136.4 LBS | DIASTOLIC BLOOD PRESSURE: 62 MMHG | HEART RATE: 71 BPM

## 2022-07-28 DIAGNOSIS — R10.12 LEFT UPPER QUADRANT ABDOMINAL PAIN: Primary | ICD-10-CM

## 2022-07-28 DIAGNOSIS — R14.0 BLOATING: ICD-10-CM

## 2022-07-28 PROCEDURE — 99214 OFFICE O/P EST MOD 30 MIN: CPT | Performed by: INTERNAL MEDICINE

## 2022-07-28 PROCEDURE — 1160F RVW MEDS BY RX/DR IN RCRD: CPT | Performed by: INTERNAL MEDICINE

## 2022-07-28 NOTE — PROGRESS NOTES
[facility] - Outpatient Follow-up Note  Choco De Luna 73 y o  male MRN: [id number]  Encounter: [id number]          ASSESSMENT AND PLAN:  67-year-old male with history of insulin-dependent diabetes, hyperlipidemia who presents for follow-up evaluation,       1  Left upper quadrant abdominal pain  2  Bloating  He has a history of chronic left quadrant abdominal pain and bloating  He was previously ordered for EGD, gastric emptying study and ultrasound after his last office visit but did not have these performed  He is scheduled for EGD next month  I have reordered his gastric emptying study and ultrasound for evaluation of his abdominal pain  We discussed if his endoscopy is normal, symptoms may be related to gastroparesis given his history of poorly controlled type 2 diabetes  Gastric emptying study is ordered for evaluation  History only on pantoprazole once daily recommend he continue this dose  Colon cancer screening: He is scheduled for screening colonoscopy next month     Follow up in GI office once the above workup and procedures are complete    ______________________________________________________________________    SUBJECTIVE:  67-year-old male with history of insulin-dependent diabetes, hyperlipidemia who presents for follow-up evaluation,     He was last seen in the GI office February 2022  At that time he had chronic epigastric abdominal pain and bloating  He also was overdue for colon cancer screening  He was ordered for EGD and colonoscopy but did not have these performed  He was also ordered for right quadrant ultrasound and gastric emptying study and also do not have those performed  Interval history: He is currently scheduled for EGD and colonoscopy in August   He continues to have left lower quadrant abdominal pain which he states is daily and has waves of worsening  He also reports bloating  There are certain foods that can make this worse    He denies early satiety  He denies gastroesophageal reflux  He reports his bowel movements are regular multiple times per day and are semi formed  He denies rectal bleeding      A1c 10  8 in 1/2022        REVIEW OF SYSTEMS IS OTHERWISE NEGATIVE  Ten point review of systems is negative other than status for HPI    Historical Information   History reviewed  No pertinent past medical history  History reviewed  No pertinent surgical history  Social History   Social History     Substance and Sexual Activity   Alcohol Use Not Currently     Social History     Substance and Sexual Activity   Drug Use Never     Social History     Tobacco Use   Smoking Status Current Every Day Smoker    Packs/day: 0 50    Types: Cigarettes   Smokeless Tobacco Never Used     History reviewed  No pertinent family history      Meds/Allergies       Current Outpatient Medications:     Alcohol Swabs (Alcohol Pads) 70 % PADS    Aspirin Low Dose 81 MG EC tablet    cholecalciferol (VITAMIN D3) 1,000 units tablet    Continuous Blood Gluc  (FreeStyle Angella 14 Day [place]) AYLA    Continuous Blood Gluc Sensor (FreeStyle Angella 14 Day Sensor) MISC    Diclofenac Sodium (VOLTAREN) 1 %    gabapentin (NEURONTIN) 800 mg tablet    glucose blood (FREESTYLE LITE) test strip    glucose monitoring kit (FREESTYLE) monitoring kit    insulin detemir (Levemir FlexTouch) 100 Units/mL injection pen    Insulin Pen Needle 32G X 4 MM MISC    Lancets (freestyle) lancets    metFORMIN (GLUCOPHAGE) 1000 MG tablet    pantoprazole (PROTONIX) 40 mg tablet    saccharomyces boulardii (FLORASTOR) 250 mg capsule    sitaGLIPtin (JANUVIA) 100 mg tablet    sucralfate (CARAFATE) 1 g tablet    traZODone (DESYREL) 50 mg tablet    atorvastatin (LIPITOR) 10 mg tablet    nicotine (NICODERM CQ) 21 mg/24 hr TD 24 hr patch    ondansetron (ZOFRAN-ODT) 4 mg disintegrating tablet    sildenafil (VIAGRA) 25 MG tablet    No Known Allergies        Objective     Blood pressure 117/62, pulse 71, temperature 97 6 °F (36 4 °C), weight 61 9 kg (136 lb 6 4 oz)  Body mass index is 27 55 kg/m²  PHYSICAL EXAM:      General Appearance:   Alert, cooperative, no distress   HEENT:   Normocephalic, atraumatic, anicteric  Neck:  Supple, symmetrical, trachea midline   Lungs:   Clear to auscultation bilaterally; no rales, rhonchi or wheezing; respirations unlabored    Heart[de-identified]   Regular rate and rhythm; no murmur, rub, or gallop  Abdomen:   Soft, non-tender, non-distended; normal bowel sounds; no masses, no organomegaly    Genitalia:   Deferred    Rectal:   Deferred    Extremities:  No cyanosis, clubbing or edema    Pulses:  2+ and symmetric    Skin:  No jaundice, rashes, or lesions    Lymph nodes:  No palpable cervical lymphadenopathy        Lab Results:   No visits with results within 1 Day(s) from this visit  Latest known visit with results is:   Office Visit on 01/18/2022   Component Date Value    Hemoglobin A1C 01/18/2022 10 8 (A)         Radiology Results:   No results found

## 2022-07-28 NOTE — H&P (VIEW-ONLY)
[facility] - Outpatient Follow-up Note  Laurian Spurling 73 y o  male MRN: [id number]  Encounter: [id number]          ASSESSMENT AND PLAN:  29-year-old male with history of insulin-dependent diabetes, hyperlipidemia who presents for follow-up evaluation,       1  Left upper quadrant abdominal pain  2  Bloating  He has a history of chronic left quadrant abdominal pain and bloating  He was previously ordered for EGD, gastric emptying study and ultrasound after his last office visit but did not have these performed  He is scheduled for EGD next month  I have reordered his gastric emptying study and ultrasound for evaluation of his abdominal pain  We discussed if his endoscopy is normal, symptoms may be related to gastroparesis given his history of poorly controlled type 2 diabetes  Gastric emptying study is ordered for evaluation  History only on pantoprazole once daily recommend he continue this dose  Colon cancer screening: He is scheduled for screening colonoscopy next month     Follow up in GI office once the above workup and procedures are complete    ______________________________________________________________________    SUBJECTIVE:  29-year-old male with history of insulin-dependent diabetes, hyperlipidemia who presents for follow-up evaluation,     He was last seen in the GI office February 2022  At that time he had chronic epigastric abdominal pain and bloating  He also was overdue for colon cancer screening  He was ordered for EGD and colonoscopy but did not have these performed  He was also ordered for right quadrant ultrasound and gastric emptying study and also do not have those performed  Interval history: He is currently scheduled for EGD and colonoscopy in August   He continues to have left lower quadrant abdominal pain which he states is daily and has waves of worsening  He also reports bloating  There are certain foods that can make this worse    He denies early satiety  He denies gastroesophageal reflux  He reports his bowel movements are regular multiple times per day and are semi formed  He denies rectal bleeding      A1c 10  8 in 1/2022        REVIEW OF SYSTEMS IS OTHERWISE NEGATIVE  Ten point review of systems is negative other than status for HPI    Historical Information   History reviewed  No pertinent past medical history  History reviewed  No pertinent surgical history  Social History   Social History     Substance and Sexual Activity   Alcohol Use Not Currently     Social History     Substance and Sexual Activity   Drug Use Never     Social History     Tobacco Use   Smoking Status Current Every Day Smoker    Packs/day: 0 50    Types: Cigarettes   Smokeless Tobacco Never Used     History reviewed  No pertinent family history      Meds/Allergies       Current Outpatient Medications:     Alcohol Swabs (Alcohol Pads) 70 % PADS    Aspirin Low Dose 81 MG EC tablet    cholecalciferol (VITAMIN D3) 1,000 units tablet    Continuous Blood Gluc  (FreeStyle Angella 14 Day [place]) AYLA    Continuous Blood Gluc Sensor (FreeStyle Angella 14 Day Sensor) MISC    Diclofenac Sodium (VOLTAREN) 1 %    gabapentin (NEURONTIN) 800 mg tablet    glucose blood (FREESTYLE LITE) test strip    glucose monitoring kit (FREESTYLE) monitoring kit    insulin detemir (Levemir FlexTouch) 100 Units/mL injection pen    Insulin Pen Needle 32G X 4 MM MISC    Lancets (freestyle) lancets    metFORMIN (GLUCOPHAGE) 1000 MG tablet    pantoprazole (PROTONIX) 40 mg tablet    saccharomyces boulardii (FLORASTOR) 250 mg capsule    sitaGLIPtin (JANUVIA) 100 mg tablet    sucralfate (CARAFATE) 1 g tablet    traZODone (DESYREL) 50 mg tablet    atorvastatin (LIPITOR) 10 mg tablet    nicotine (NICODERM CQ) 21 mg/24 hr TD 24 hr patch    ondansetron (ZOFRAN-ODT) 4 mg disintegrating tablet    sildenafil (VIAGRA) 25 MG tablet    No Known Allergies        Objective     Blood pressure 117/62, pulse 71, temperature 97 6 °F (36 4 °C), weight 61 9 kg (136 lb 6 4 oz)  Body mass index is 27 55 kg/m²  PHYSICAL EXAM:      General Appearance:   Alert, cooperative, no distress   HEENT:   Normocephalic, atraumatic, anicteric  Neck:  Supple, symmetrical, trachea midline   Lungs:   Clear to auscultation bilaterally; no rales, rhonchi or wheezing; respirations unlabored    Heart[de-identified]   Regular rate and rhythm; no murmur, rub, or gallop  Abdomen:   Soft, non-tender, non-distended; normal bowel sounds; no masses, no organomegaly    Genitalia:   Deferred    Rectal:   Deferred    Extremities:  No cyanosis, clubbing or edema    Pulses:  2+ and symmetric    Skin:  No jaundice, rashes, or lesions    Lymph nodes:  No palpable cervical lymphadenopathy        Lab Results:   No visits with results within 1 Day(s) from this visit  Latest known visit with results is:   Office Visit on 01/18/2022   Component Date Value    Hemoglobin A1C 01/18/2022 10 8 (A)         Radiology Results:   No results found

## 2022-08-03 ENCOUNTER — PATIENT OUTREACH (OUTPATIENT)
Dept: FAMILY MEDICINE CLINIC | Facility: CLINIC | Age: 67
End: 2022-08-03

## 2022-08-03 DIAGNOSIS — E11.69 TYPE 2 DIABETES MELLITUS WITH OTHER SPECIFIED COMPLICATION, UNSPECIFIED WHETHER LONG TERM INSULIN USE (HCC): ICD-10-CM

## 2022-08-03 DIAGNOSIS — E11.9 ENCOUNTER FOR DIABETIC FOOT EXAM (HCC): Primary | ICD-10-CM

## 2022-08-03 NOTE — PROGRESS NOTES
I have refilled his metformin and placed a podiatry referral, however in patient's I have never seen, particularly if they have already no showed an appointment I prefer to wait to order labs till I have in fact seen them

## 2022-08-03 NOTE — PROGRESS NOTES
I called the patient to follow up  He states he is feeling "horrible" noting increased pain of his joints (knees, ankles, wrists, fingers and pelvis)  He is concerned he may have arthritis; will send note to PCP  He states he cannot walk a block without the pain causing him to stop to rest   The patient currently takes gabapentin for neuropathy  The patient notes his blood sugars have been "fantastic" reporting readings of 90/124/129  He states all readings are <150  He discontinued levemir months ago and is currently taking Januvia qd and metformin bid  The patient states he is checking his feet daily and denies any skin breakdown  He is requesting a referral to see a Podiatrist as he needs help trimming his nails; note sent to PCP  The patient will call with any questions or concerns  He is in need of a refill which I will submit to the PCP  He no showed for his last PCP appointment as he stated he did not receive a reminder through [facility]; will send note to clerical to reschedule  The patient is aware of his EGD appointment next week  I will continue to follow

## 2022-08-09 RX ORDER — SODIUM CHLORIDE 9 MG/ML
125 INJECTION, SOLUTION INTRAVENOUS CONTINUOUS
Status: CANCELLED | OUTPATIENT
Start: 2022-08-09

## 2022-08-11 ENCOUNTER — ANESTHESIA (OUTPATIENT)
Dept: GASTROENTEROLOGY | Facility: HOSPITAL | Age: 67
End: 2022-08-11

## 2022-08-11 ENCOUNTER — HOSPITAL ENCOUNTER (OUTPATIENT)
Dept: GASTROENTEROLOGY | Facility: HOSPITAL | Age: 67
Setting detail: OUTPATIENT SURGERY
Discharge: HOME | End: 2022-08-11
Attending: INTERNAL MEDICINE | Admitting: INTERNAL MEDICINE
Payer: COMMERCIAL

## 2022-08-11 ENCOUNTER — ANESTHESIA EVENT (OUTPATIENT)
Dept: GASTROENTEROLOGY | Facility: HOSPITAL | Age: 67
End: 2022-08-11

## 2022-08-11 VITALS
HEART RATE: 70 BPM | SYSTOLIC BLOOD PRESSURE: 102 MMHG | WEIGHT: 138 LBS | HEIGHT: 60 IN | OXYGEN SATURATION: 98 % | DIASTOLIC BLOOD PRESSURE: 59 MMHG | TEMPERATURE: 97.5 F | RESPIRATION RATE: 16 BRPM | BODY MASS INDEX: 27.09 KG/M2

## 2022-08-11 DIAGNOSIS — R14.0 BLOATING: ICD-10-CM

## 2022-08-11 DIAGNOSIS — R10.13 EPIGASTRIC PAIN: ICD-10-CM

## 2022-08-11 DIAGNOSIS — Z12.11 COLON CANCER SCREENING: ICD-10-CM

## 2022-08-11 LAB — GLUCOSE SERPL-MCNC: 166 MG/DL (ref 65–140)

## 2022-08-11 PROCEDURE — 88305 TISSUE EXAM BY PATHOLOGIST: CPT | Performed by: PATHOLOGY

## 2022-08-11 PROCEDURE — 88342 IMHCHEM/IMCYTCHM 1ST ANTB: CPT | Performed by: PATHOLOGY

## 2022-08-11 PROCEDURE — 82948 REAGENT STRIP/BLOOD GLUCOSE: CPT

## 2022-08-11 RX ORDER — PROPOFOL 10 MG/ML
INJECTION, EMULSION INTRAVENOUS AS NEEDED
Status: DISCONTINUED | OUTPATIENT
Start: 2022-08-11 | End: 2022-08-11

## 2022-08-11 RX ORDER — SODIUM CHLORIDE 9 MG/ML
125 INJECTION, SOLUTION INTRAVENOUS CONTINUOUS
Status: DISCONTINUED | OUTPATIENT
Start: 2022-08-11 | End: 2022-08-15 | Stop reason: HOSPADM

## 2022-08-11 RX ORDER — LIDOCAINE HYDROCHLORIDE 20 MG/ML
INJECTION, SOLUTION EPIDURAL; INFILTRATION; INTRACAUDAL; PERINEURAL AS NEEDED
Status: DISCONTINUED | OUTPATIENT
Start: 2022-08-11 | End: 2022-08-11

## 2022-08-11 RX ADMIN — PROPOFOL 120 MG: 10 INJECTION, EMULSION INTRAVENOUS at 07:48

## 2022-08-11 RX ADMIN — LIDOCAINE HYDROCHLORIDE 50 MG: 20 INJECTION, SOLUTION EPIDURAL; INFILTRATION; INTRACAUDAL; PERINEURAL at 07:48

## 2022-08-11 RX ADMIN — SODIUM CHLORIDE 125 ML/HR: 9 INJECTION, SOLUTION INTRAVENOUS at 07:30

## 2022-08-11 RX ADMIN — PROPOFOL 20 MG: 10 INJECTION, EMULSION INTRAVENOUS at 07:52

## 2022-08-11 RX ADMIN — PROPOFOL 30 MG: 10 INJECTION, EMULSION INTRAVENOUS at 07:50

## 2022-08-11 NOTE — ANESTHESIA POSTPROCEDURE EVALUATION
Post-Op Assessment Note    CV Status:  Stable    Pain management: adequate     Mental Status:  Alert and awake   Hydration Status:  Euvolemic   PONV Controlled:  Controlled   Airway Patency:  Patent      Post Op Vitals Reviewed: Yes      Staff: Anesthesiologist, CRNA         No complications documented      BP      Temp      Pulse     Resp      SpO2      /59   Pulse 70   Temp 97 5 °F (36 4 °C) (Temporal)   Resp 16   Ht 4' 11" (1 499 m)   Wt 62 6 kg (138 lb)   SpO2 98%   BMI 27 87 kg/m²

## 2022-08-11 NOTE — INTERVAL H&P NOTE
H&P reviewed  After examining the patient I find no changes in the patients condition since the H&P had been written      Vitals:    08/11/22 0717   BP: 146/72   Pulse: 72   Resp: 18   Temp: 97 5 °F (36 4 °C)   SpO2: 99%

## 2022-08-11 NOTE — ANESTHESIA PREPROCEDURE EVALUATION
Procedure:  COLONOSCOPY  EGD    Relevant Problems   ENDO   (+) Type 2 diabetes mellitus with other specified complication (HCC)      NEURO/PSYCH   (+) Anxiety   (+) Chronic neuropathic pain   (+) Diabetic neuropathy (HCC)   (+) History of heroin abuse ([facility] [address] )      PULMONARY   (+) Asthma due to environmental allergies   (+) JESSICA (obstructive sleep apnea)   (+) Smoker        Physical Exam    Airway    Mallampati score: III  TM Distance: >3 FB  Neck ROM: full     Dental   No notable dental hx     Cardiovascular  Rhythm: regular, Rate: normal, Cardiovascular exam normal    Pulmonary  Breath sounds clear to auscultation, Decreased breath sounds,     Other Findings        Anesthesia Plan  ASA Score- 3     Anesthesia Type- IV sedation with anesthesia with ASA Monitors  Additional Monitors:   Airway Plan:     Comment: GA prn  NPO X for "sips of soda" with meds (0630)  Did NOT do bowel prep, thus no colonoscopy          Plan Factors-    Chart reviewed  Patient summary reviewed  Patient is a current smoker  Patient instructed to abstain from smoking on day of procedure  Patient smoked on day of surgery  Induction- intravenous  Postoperative Plan-     Informed Consent- Anesthetic plan and risks discussed with patient  I personally reviewed this patient with the CRNA  Discussed and agreed on the Anesthesia Plan with the CRNA  Karrie Nissen

## 2022-08-22 NOTE — RESULT ENCOUNTER NOTE
Hi,    His biopsies came back with H pylori? Can you let him know and also treat him per the protocol? Thank you!

## 2022-08-23 ENCOUNTER — TELEPHONE (OUTPATIENT)
Dept: GASTROENTEROLOGY | Facility: MEDICAL CENTER | Age: 67
End: 2022-08-23

## 2022-08-23 ENCOUNTER — PATIENT OUTREACH (OUTPATIENT)
Dept: FAMILY MEDICINE CLINIC | Facility: CLINIC | Age: 67
End: 2022-08-23

## 2022-08-23 ENCOUNTER — RA CDI HCC (OUTPATIENT)
Dept: OTHER | Facility: HOSPITAL | Age: 67
End: 2022-08-23

## 2022-08-23 DIAGNOSIS — A04.8 H. PYLORI INFECTION: Primary | ICD-10-CM

## 2022-08-23 RX ORDER — TETRACYCLINE HYDROCHLORIDE 500 MG/1
500 CAPSULE ORAL EVERY 6 HOURS
Qty: 56 CAPSULE | Refills: 0 | Status: SHIPPED | OUTPATIENT
Start: 2022-08-23 | End: 2022-09-06

## 2022-08-23 RX ORDER — METRONIDAZOLE 250 MG/1
250 TABLET ORAL EVERY 6 HOURS
Qty: 56 TABLET | Refills: 0 | Status: SHIPPED | OUTPATIENT
Start: 2022-08-23 | End: 2022-09-06

## 2022-08-23 RX ORDER — PANTOPRAZOLE SODIUM 40 MG/1
40 TABLET, DELAYED RELEASE ORAL EVERY 12 HOURS
Qty: 28 TABLET | Refills: 0 | Status: SHIPPED | OUTPATIENT
Start: 2022-08-23 | End: 2022-09-02

## 2022-08-23 RX ORDER — BISMUTH SUBSALICYLATE 262 MG/1
524 TABLET, CHEWABLE ORAL EVERY 6 HOURS
Qty: 30 TABLET | Refills: 0 | Status: SHIPPED | OUTPATIENT
Start: 2022-08-23 | End: 2022-09-06

## 2022-08-23 NOTE — TELEPHONE ENCOUNTER
Spoke with pt, relayed h pylori results & H pylori treatment protocol instructions reviewed  Medications sent to pharmacy, h pylori stool order placed  Pt agreeable and verbalized understanding of all instructions

## 2022-08-23 NOTE — PROGRESS NOTES
[facility] [address]  coding opportunities        [address] and [address]  Chart Reviewed number of suggestions sent to Provider: 2     Patients Insurance     Medicare Insurance: [address] [place] [address]

## 2022-08-23 NOTE — PROGRESS NOTES
I received a voicemail from the patient asking what type of infection he has in his stomach  Chart was reviewed showing that GI spoke with the patient earlier today informing him of his biopsy resulting with H-pylori  The patient requested a response via email; I emailed him the infection name and also reminded him of his appointment for tomorrow at [place] at 1pm   I will continue to follow

## 2022-08-24 ENCOUNTER — OFFICE VISIT (OUTPATIENT)
Dept: FAMILY MEDICINE CLINIC | Facility: CLINIC | Age: 67
End: 2022-08-24

## 2022-08-24 VITALS
DIASTOLIC BLOOD PRESSURE: 60 MMHG | HEIGHT: 60 IN | HEART RATE: 96 BPM | TEMPERATURE: 97.8 F | WEIGHT: 136 LBS | OXYGEN SATURATION: 98 % | SYSTOLIC BLOOD PRESSURE: 100 MMHG | RESPIRATION RATE: 18 BRPM | BODY MASS INDEX: 26.7 KG/M2

## 2022-08-24 DIAGNOSIS — E78.00 PURE HYPERCHOLESTEROLEMIA: ICD-10-CM

## 2022-08-24 DIAGNOSIS — E55.9 VITAMIN D DEFICIENCY: ICD-10-CM

## 2022-08-24 DIAGNOSIS — Z12.2 ENCOUNTER FOR SCREENING FOR LUNG CANCER: ICD-10-CM

## 2022-08-24 DIAGNOSIS — F17.200 SMOKER: ICD-10-CM

## 2022-08-24 DIAGNOSIS — Z12.5 PROSTATE CANCER SCREENING: ICD-10-CM

## 2022-08-24 DIAGNOSIS — M62.838 MUSCLE SPASM: ICD-10-CM

## 2022-08-24 DIAGNOSIS — G47.33 OSA (OBSTRUCTIVE SLEEP APNEA): ICD-10-CM

## 2022-08-24 DIAGNOSIS — E11.9 DIABETIC EYE EXAM (HCC): ICD-10-CM

## 2022-08-24 DIAGNOSIS — K29.50 OTHER CHRONIC GASTRITIS WITHOUT HEMORRHAGE: ICD-10-CM

## 2022-08-24 DIAGNOSIS — E11.49 OTHER DIABETIC NEUROLOGICAL COMPLICATION ASSOCIATED WITH TYPE 2 DIABETES MELLITUS (HCC): ICD-10-CM

## 2022-08-24 DIAGNOSIS — F11.11 HISTORY OF HEROIN ABUSE (HCC): ICD-10-CM

## 2022-08-24 DIAGNOSIS — Z01.00 DIABETIC EYE EXAM (HCC): ICD-10-CM

## 2022-08-24 DIAGNOSIS — E11.69 TYPE 2 DIABETES MELLITUS WITH OTHER SPECIFIED COMPLICATION, UNSPECIFIED WHETHER LONG TERM INSULIN USE (HCC): Primary | ICD-10-CM

## 2022-08-24 LAB — SL AMB POCT HEMOGLOBIN AIC: 8.5 (ref ?–6.5)

## 2022-08-24 PROCEDURE — 3052F HG A1C>EQUAL 8.0%<EQUAL 9.0%: CPT | Performed by: INTERNAL MEDICINE

## 2022-08-24 PROCEDURE — 3052F HG A1C>EQUAL 8.0%<EQUAL 9.0%: CPT | Performed by: PHYSICIAN ASSISTANT

## 2022-08-24 PROCEDURE — 3725F SCREEN DEPRESSION PERFORMED: CPT | Performed by: PHYSICIAN ASSISTANT

## 2022-08-24 PROCEDURE — 1003F LEVEL OF ACTIVITY ASSESS: CPT | Performed by: PHYSICIAN ASSISTANT

## 2022-08-24 PROCEDURE — 99214 OFFICE O/P EST MOD 30 MIN: CPT | Performed by: PHYSICIAN ASSISTANT

## 2022-08-24 PROCEDURE — 1160F RVW MEDS BY RX/DR IN RCRD: CPT | Performed by: PHYSICIAN ASSISTANT

## 2022-08-24 PROCEDURE — 83036 HEMOGLOBIN GLYCOSYLATED A1C: CPT | Performed by: PHYSICIAN ASSISTANT

## 2022-08-24 RX ORDER — GABAPENTIN 100 MG/1
100 CAPSULE ORAL 3 TIMES DAILY
Qty: 90 CAPSULE | Refills: 1 | Status: SHIPPED | OUTPATIENT
Start: 2022-08-24 | End: 2022-09-28 | Stop reason: SDUPTHER

## 2022-08-24 NOTE — ASSESSMENT & PLAN NOTE
Labs ordered to rule out metabolic cause  With patient's muscle spasms and tremor, recommend neuro consult  Pt already scheduled with neuro 10/21/22  Discussed importance of keeping this appointment

## 2022-08-24 NOTE — ASSESSMENT & PLAN NOTE
Lab Results   Component Value Date    CHOLESTEROL 207 (H) 04/03/2021     Lab Results   Component Value Date    HDL 62 04/03/2021     Lab Results   Component Value Date    TRIG 106 04/03/2021     Lab Results   Component Value Date    NONHDLC 145 04/03/2021     Lab Results   Component Value Date    LDLCALC 124 (H) 04/03/2021     Patient was not aware  Will recheck lipids before starting regimen  Low fat diet

## 2022-08-24 NOTE — ASSESSMENT & PLAN NOTE
Followed by sleep med  Patient did not tolerate complete sleep study  Due back with sleep med to titrate patient on a CPAP

## 2022-08-24 NOTE — ASSESSMENT & PLAN NOTE
Diabetes has improved  Discussed with patient other possible causes of worsening neuropathy such as B12 deficiency  Will order labs to rule out concomitant cause  Will temporarily increase gabapentin 800 mg TID to 900 TID  Use with caution and discussed not taking more than the recommended dose  Depending on lab work, may recommend switching to Lyrica for better efficacy

## 2022-08-24 NOTE — ASSESSMENT & PLAN NOTE
Lab Results   Component Value Date    HGBA1C 8 5 (A) 08/24/2022     Down from previous a1c of 10 8  Continue metformin 1000 mg BID and Januvia 100 mg daily  Discuss insulin with endocrinology  Encouraged continuing therapeutic lifestyle changes  See BMI counseling  IRIS and foot exam today  Follow up with endo and diabetic educator  Follow up in 3 months for BEBE

## 2022-08-24 NOTE — PROGRESS NOTES
Assessment/Plan:    Type 2 diabetes mellitus with other specified complication (HCC)    Lab Results   Component Value Date    HGBA1C 8 5 (A) 08/24/2022     Down from previous a1c of 10 8  Continue metformin 1000 mg BID and Januvia 100 mg daily  Discuss insulin with endocrinology  Encouraged continuing therapeutic lifestyle changes  See BMI counseling  IRIS and foot exam today  Follow up with endo and diabetic educator  Follow up in 3 months for AWV  Chronic gastritis  Recently diagnosed with H  Pylori  Start medications as directed  Close follow up with GI  Diabetic neuropathy ([place] [id number] )  Diabetes has improved  Discussed with patient other possible causes of worsening neuropathy such as B12 deficiency  Will order labs to rule out concomitant cause  Will temporarily increase gabapentin 800 mg TID to 900 TID  Use with caution and discussed not taking more than the recommended dose  Depending on lab work, may recommend switching to Lyrica for better efficacy  Pure hypercholesterolemia  Lab Results   Component Value Date    CHOLESTEROL 207 (H) 04/03/2021     Lab Results   Component Value Date    HDL 62 04/03/2021     Lab Results   Component Value Date    TRIG 106 04/03/2021     Lab Results   Component Value Date    NONHDLC 145 04/03/2021     Lab Results   Component Value Date    LDLCALC 124 (H) 04/03/2021     Patient was not aware  Will recheck lipids before starting regimen  Low fat diet  JESSICA (obstructive sleep apnea)  Followed by sleep med  Patient did not tolerate complete sleep study  Due back with sleep med to titrate patient on a CPAP  Muscle spasm  Labs ordered to rule out metabolic cause  With patient's muscle spasms and tremor, recommend neuro consult  Pt already scheduled with neuro 10/21/22  Discussed importance of keeping this appointment  History of heroin abuse ([facility] )  Remission 10 years  Gave encouragement  BMI Counseling: Body mass index is 27 47 kg/m²   The BMI is above normal  Nutrition recommendations include decreasing portion sizes, encouraging healthy choices of fruits and vegetables, consuming healthier snacks, limiting drinks that contain sugar, moderation in carbohydrate intake, increasing intake of lean protein, reducing intake of saturated and trans fat and reducing intake of cholesterol  Exercise recommendations include moderate physical activity 150 minutes/week, exercising 3-5 times per week and strength training exercises  No pharmacotherapy was ordered  Rationale for BMI follow-up plan is due to patient being overweight or obese  Depression Screening and Follow-up Plan: Patient was screened for depression during today's encounter  They screened negative with a PHQ-2 score of 0  Tobacco Cessation Counseling: Tobacco cessation counseling was provided  The patient is sincerely urged to quit consumption of tobacco  He is not ready to quit tobacco  Medication options and side effects of medication discussed  Patient refused medication  Lung Cancer Screening Shared Decision Making: I discussed with him that he is a candidate for lung cancer CT screening  The following Shared Decision-Making points were covered:  1  Benefits of screening were discussed, including the rates of reduction in death from lung cancer and other causes  Harms of screening were reviewed, including false positive tests, radiation exposure levels, risks of invasive procedures, risks of complications of screening, and risk of overdiagnosis  2  I counseled on the importance of adherence to annual lung cancer LDCT screening, impact of co-morbidities, and ability or willingness to undergo diagnosis and treatment    3  I counseled on the importance of maintaining abstinence as a former smoker or was counseled on the importance of smoking cessation if a current smoker    Review of Eligibility Criteria: He meets all of the criteria for Lung Cancer Screening    - He is 73 y o    - He has 20 pack year tobacco history and is a current smoker or has quit within the past 15 years  - He presents no signs or symptoms of lung cancer    After discussion, the patient decided to elect lung cancer screening  PHQ-2/9 Depression Screening    Little interest or pleasure in doing things: 0 - not at all  Feeling down, depressed, or hopeless: 0 - not at all  PHQ-2 Score: 0  PHQ-2 Interpretation: Negative depression screen          Diagnoses and all orders for this visit:    Type 2 diabetes mellitus with other specified complication, unspecified whether long term insulin use (HCC)  -     POCT hemoglobin A1c  -     CBC and differential; Future  -     Comprehensive metabolic panel; Future  -     Microalbumin / creatinine urine ratio    Other diabetic neurological complication associated with type 2 diabetes mellitus (HCC)  -     TSH, 3rd generation with Free T4 reflex; Future  -     Vitamin B12; Future  -     gabapentin (Neurontin) 100 mg capsule; Take 1 capsule (100 mg total) by mouth 3 (three) times a day    Other chronic gastritis without hemorrhage    Pure hypercholesterolemia  -     Lipid Panel with Direct LDL reflex; Future    JESSICA (obstructive sleep apnea)    Muscle spasm    Vitamin D deficiency  -     Vitamin D 25 hydroxy; Future    Smoker    Diabetic eye exam ([facility] )  -     IRIS Diabetic eye exam    Encounter for screening for lung cancer  -     CT lung screening program; Future    History of heroin abuse ([facility] 75 )    Prostate cancer screening  -     PSA, total and free; Future    BMI 27 0-27 9,adult          Subjective:      Patient ID: Rosamaria Hannon is a 73 y o  male  Patient is a 73year old male with a PMH of T2DM, HTN, HLD, diabetic neuropathy, gastritis, and JESSICA presenting for chronic condition follow up  He states his nerve pain has been worsening over the past several months  It is not only in his feet, but in his entire body sometimes  It is a burning pain  At worst 10/10   He can not sleep at night due to the pain  Occasionally takes a double clark of his gabapentin at night time to help with this  Sometimes has numbness and tingling, but mostly in his feet, especially his great toes  States he has also been noticing "random" muscle spasms throughout his body  It occurs in various locations  No cause or provocation  He is experiencing a tremor with use of his right hand  This also started within in the past year  He has been a diabetic for 20+ years  He has a CGM and states his sugars have been good  He denies any hyperglycemic events  Admits to some hypoglycemic events in the 60s in the morning  He will wake up very hungry  He forgot to bring his log and reader with him today  Admits to compliance with his metformin and Januvia  He states he stopped his insulin about 3 months ago  He did not feel comfortable using it and was scared of low sugars  Denies changes in appetite or weight  Denies polyphagia, polyuria, and polydipsia  Denies fatigue  Denies dizziness or lightheadedness  Denies visual changes  Since his last visit he saw GI  They diagnosed him with H  Pylori infection  He has not started the medication yet  He is still experiencing epigastric pain daily  Frequent nausea  No vomiting  Sometimes has diarrhea  Denies hematochezia and melena  Waiting for GI to schedule colonoscopy  Patient states he was not aware of having high cholesterol  Has not been taking Lipitor  History of heroin abuse  Has been in remission for almost 10 years, 2013  The following portions of the patient's history were reviewed and updated as appropriate: allergies, current medications, past family history, past medical history, past social history, past surgical history and problem list     Review of Systems   Constitutional: Negative for activity change, appetite change, chills, diaphoresis, fatigue, fever and unexpected weight change  HENT: Negative for congestion, hearing loss, sore throat and trouble swallowing      Eyes: Negative for visual disturbance  Respiratory: Negative for cough, chest tightness, shortness of breath and wheezing  Cardiovascular: Negative for chest pain, palpitations and leg swelling  Gastrointestinal: Positive for abdominal distention, abdominal pain, diarrhea and nausea  Negative for blood in stool, constipation and vomiting  Endocrine: Negative for cold intolerance, heat intolerance, polydipsia, polyphagia and polyuria  Genitourinary: Negative for decreased urine volume, difficulty urinating, dysuria, frequency, hematuria and urgency  Musculoskeletal: Positive for myalgias  Negative for arthralgias, back pain, gait problem, joint swelling and neck pain  Skin: Negative for color change, pallor, rash and wound  Neurological: Positive for tremors and numbness  Negative for dizziness, seizures, syncope, speech difficulty, weakness, light-headedness and headaches  Hematological: Negative for adenopathy  Does not bruise/bleed easily  Psychiatric/Behavioral: Positive for sleep disturbance  Negative for agitation, behavioral problems, confusion, decreased concentration, dysphoric mood, self-injury and suicidal ideas  The patient is nervous/anxious  Objective:      /60 (BP Location: Left arm, Patient Position: Sitting, Cuff Size: Adult)   Pulse 96   Temp 97 8 °F (36 6 °C) (Temporal)   Resp 18   Ht 4' 11" (1 499 m)   Wt 61 7 kg (136 lb)   SpO2 98%   BMI 27 47 kg/m²          Physical Exam  Vitals and nursing note reviewed  Constitutional:       General: He is awake  He is not in acute distress  Appearance: Normal appearance  He is well-developed, well-groomed and overweight  HENT:      Head: Normocephalic and atraumatic  Mouth/Throat:      Mouth: Mucous membranes are moist    Eyes:      Conjunctiva/sclera: Conjunctivae normal    Neck:      Vascular: No carotid bruit  Cardiovascular:      Rate and Rhythm: Normal rate and regular rhythm        Pulses: Normal pulses  no weak pulses          Dorsalis pedis pulses are 2+ on the right side and 2+ on the left side  Posterior tibial pulses are 2+ on the right side and 2+ on the left side  Heart sounds: Normal heart sounds  No murmur heard  Pulmonary:      Effort: Pulmonary effort is normal  No respiratory distress  Breath sounds: Normal breath sounds  No stridor  No decreased breath sounds, wheezing, rhonchi or rales  Chest:      Chest wall: No tenderness  Abdominal:      General: Abdomen is flat  Bowel sounds are normal       Palpations: Abdomen is soft  Tenderness: There is abdominal tenderness in the epigastric area  There is no right CVA tenderness, left CVA tenderness, guarding or rebound  Negative signs include Lyman's sign, Rovsing's sign, McBurney's sign, psoas sign and obturator sign  Musculoskeletal:         General: No swelling or tenderness  Normal range of motion  Cervical back: Full passive range of motion without pain, normal range of motion and neck supple  No rigidity or tenderness  Right lower leg: No edema  Left lower leg: No edema  Feet:      Right foot:      Skin integrity: No ulcer, skin breakdown, erythema, warmth, callus or dry skin  Left foot:      Skin integrity: No ulcer, skin breakdown, erythema, warmth, callus or dry skin  Lymphadenopathy:      Cervical: No cervical adenopathy  Skin:     General: Skin is warm and dry  Capillary Refill: Capillary refill takes less than 2 seconds  Coloration: Skin is not jaundiced  Findings: No rash  Neurological:      General: No focal deficit present  Mental Status: He is alert and oriented to person, place, and time  Mental status is at baseline  Sensory: Sensation is intact  Motor: Motor function is intact  Coordination: Coordination is intact  Gait: Gait is intact     Psychiatric:         Attention and Perception: Attention and perception normal          Mood and Affect: Mood and affect normal          Behavior: Behavior normal  Behavior is cooperative  Thought Content: Thought content normal          Cognition and Memory: Cognition and memory normal          Judgment: Judgment normal            Patient's shoes and socks removed  Right Foot/Ankle   Right Foot Inspection  Skin Exam: skin normal and skin intact  No dry skin, no warmth, no callus, no erythema, no maceration, no abnormal color, no pre-ulcer, no ulcer and no callus  Toe Exam: ROM and strength within normal limits  Sensory   Proprioception: intact  Monofilament testing: intact    Vascular  Capillary refills: < 3 seconds  The right DP pulse is 2+  The right PT pulse is 2+  Left Foot/Ankle  Left Foot Inspection  Skin Exam: skin normal and skin intact  No dry skin, no warmth, no erythema, no maceration, normal color, no pre-ulcer, no ulcer and no callus  Toe Exam: ROM and strength within normal limits  Sensory   Proprioception: intact  Monofilament testing: intact    Vascular  Capillary refills: < 3 seconds  The left DP pulse is 2+  The left PT pulse is 2+       Assign Risk Category  No deformity present  No loss of protective sensation  No weak pulses  Risk: 0

## 2022-09-02 DIAGNOSIS — A04.8 H. PYLORI INFECTION: ICD-10-CM

## 2022-09-02 RX ORDER — PANTOPRAZOLE SODIUM 40 MG/1
TABLET, DELAYED RELEASE ORAL
Qty: 28 TABLET | Refills: 0 | Status: SHIPPED | OUTPATIENT
Start: 2022-09-02 | End: 2022-09-28

## 2022-09-15 ENCOUNTER — TELEPHONE (OUTPATIENT)
Dept: ENDOCRINOLOGY | Facility: CLINIC | Age: 67
End: 2022-09-15

## 2022-09-20 ENCOUNTER — APPOINTMENT (OUTPATIENT)
Dept: LAB | Facility: CLINIC | Age: 67
End: 2022-09-20
Payer: COMMERCIAL

## 2022-09-20 ENCOUNTER — OFFICE VISIT (OUTPATIENT)
Dept: DIABETES SERVICES | Facility: OTHER | Age: 67
End: 2022-09-20
Payer: COMMERCIAL

## 2022-09-20 DIAGNOSIS — E11.69 TYPE 2 DIABETES MELLITUS WITH OTHER SPECIFIED COMPLICATION, UNSPECIFIED WHETHER LONG TERM INSULIN USE (HCC): Primary | ICD-10-CM

## 2022-09-20 DIAGNOSIS — E11.69 TYPE 2 DIABETES MELLITUS WITH OTHER SPECIFIED COMPLICATION, UNSPECIFIED WHETHER LONG TERM INSULIN USE (HCC): ICD-10-CM

## 2022-09-20 DIAGNOSIS — E55.9 VITAMIN D DEFICIENCY: ICD-10-CM

## 2022-09-20 DIAGNOSIS — Z12.5 PROSTATE CANCER SCREENING: ICD-10-CM

## 2022-09-20 DIAGNOSIS — E78.00 PURE HYPERCHOLESTEROLEMIA: ICD-10-CM

## 2022-09-20 DIAGNOSIS — E11.49 OTHER DIABETIC NEUROLOGICAL COMPLICATION ASSOCIATED WITH TYPE 2 DIABETES MELLITUS (HCC): ICD-10-CM

## 2022-09-20 LAB
25(OH)D3 SERPL-MCNC: 21.6 NG/ML (ref 30–100)
ALBUMIN SERPL BCP-MCNC: 4 G/DL (ref 3.5–5)
ALP SERPL-CCNC: 76 U/L (ref 46–116)
ALT SERPL W P-5'-P-CCNC: 28 U/L (ref 12–78)
ANION GAP SERPL CALCULATED.3IONS-SCNC: 6 MMOL/L (ref 4–13)
AST SERPL W P-5'-P-CCNC: 16 U/L (ref 5–45)
BASOPHILS # BLD AUTO: 0.05 THOUSANDS/ΜL (ref 0–0.1)
BASOPHILS NFR BLD AUTO: 1 % (ref 0–1)
BILIRUB SERPL-MCNC: 0.59 MG/DL (ref 0.2–1)
BUN SERPL-MCNC: 13 MG/DL (ref 5–25)
CALCIUM SERPL-MCNC: 9.6 MG/DL (ref 8.3–10.1)
CHLORIDE SERPL-SCNC: 101 MMOL/L (ref 96–108)
CHOLEST SERPL-MCNC: 231 MG/DL
CO2 SERPL-SCNC: 26 MMOL/L (ref 21–32)
CREAT SERPL-MCNC: 1 MG/DL (ref 0.6–1.3)
CREAT UR-MCNC: 187 MG/DL
EOSINOPHIL # BLD AUTO: 0.36 THOUSAND/ΜL (ref 0–0.61)
EOSINOPHIL NFR BLD AUTO: 4 % (ref 0–6)
ERYTHROCYTE [DISTWIDTH] IN BLOOD BY AUTOMATED COUNT: 13.2 % (ref 11.6–15.1)
GFR SERPL CREATININE-BSD FRML MDRD: 78 ML/MIN/1.73SQ M
GLUCOSE P FAST SERPL-MCNC: 289 MG/DL (ref 65–99)
HCT VFR BLD AUTO: 45.6 % (ref 36.5–49.3)
HDLC SERPL-MCNC: 59 MG/DL
HGB BLD-MCNC: 14.9 G/DL (ref 12–17)
IMM GRANULOCYTES # BLD AUTO: 0.03 THOUSAND/UL (ref 0–0.2)
IMM GRANULOCYTES NFR BLD AUTO: 0 % (ref 0–2)
LDLC SERPL CALC-MCNC: 134 MG/DL (ref 0–100)
LYMPHOCYTES # BLD AUTO: 2.05 THOUSANDS/ΜL (ref 0.6–4.47)
LYMPHOCYTES NFR BLD AUTO: 24 % (ref 14–44)
MCH RBC QN AUTO: 28 PG (ref 26.8–34.3)
MCHC RBC AUTO-ENTMCNC: 32.7 G/DL (ref 31.4–37.4)
MCV RBC AUTO: 86 FL (ref 82–98)
MICROALBUMIN UR-MCNC: 260 MG/L (ref 0–20)
MICROALBUMIN/CREAT 24H UR: 139 MG/G CREATININE (ref 0–30)
MONOCYTES # BLD AUTO: 0.71 THOUSAND/ΜL (ref 0.17–1.22)
MONOCYTES NFR BLD AUTO: 8 % (ref 4–12)
NEUTROPHILS # BLD AUTO: 5.25 THOUSANDS/ΜL (ref 1.85–7.62)
NEUTS SEG NFR BLD AUTO: 63 % (ref 43–75)
NRBC BLD AUTO-RTO: 0 /100 WBCS
PLATELET # BLD AUTO: 152 THOUSANDS/UL (ref 149–390)
PMV BLD AUTO: 12.7 FL (ref 8.9–12.7)
POTASSIUM SERPL-SCNC: 3.8 MMOL/L (ref 3.5–5.3)
PROT SERPL-MCNC: 7.7 G/DL (ref 6.4–8.4)
RBC # BLD AUTO: 5.33 MILLION/UL (ref 3.88–5.62)
SODIUM SERPL-SCNC: 133 MMOL/L (ref 135–147)
TRIGL SERPL-MCNC: 191 MG/DL
TSH SERPL DL<=0.05 MIU/L-ACNC: 0.96 UIU/ML (ref 0.45–4.5)
VIT B12 SERPL-MCNC: 1194 PG/ML (ref 100–900)
WBC # BLD AUTO: 8.45 THOUSAND/UL (ref 4.31–10.16)

## 2022-09-20 PROCEDURE — 84443 ASSAY THYROID STIM HORMONE: CPT

## 2022-09-20 PROCEDURE — 80061 LIPID PANEL: CPT

## 2022-09-20 PROCEDURE — 84153 ASSAY OF PSA TOTAL: CPT

## 2022-09-20 PROCEDURE — 82607 VITAMIN B-12: CPT

## 2022-09-20 PROCEDURE — 82043 UR ALBUMIN QUANTITATIVE: CPT | Performed by: PHYSICIAN ASSISTANT

## 2022-09-20 PROCEDURE — 84154 ASSAY OF PSA FREE: CPT

## 2022-09-20 PROCEDURE — 80053 COMPREHEN METABOLIC PANEL: CPT

## 2022-09-20 PROCEDURE — 36415 COLL VENOUS BLD VENIPUNCTURE: CPT

## 2022-09-20 PROCEDURE — G0109 DIAB MANAGE TRN IND/GROUP: HCPCS | Performed by: DIETITIAN, REGISTERED

## 2022-09-20 PROCEDURE — 85025 COMPLETE CBC W/AUTO DIFF WBC: CPT

## 2022-09-20 PROCEDURE — 3060F POS MICROALBUMINURIA REV: CPT | Performed by: PHYSICIAN ASSISTANT

## 2022-09-20 PROCEDURE — 82570 ASSAY OF URINE CREATININE: CPT | Performed by: PHYSICIAN ASSISTANT

## 2022-09-20 PROCEDURE — 82306 VITAMIN D 25 HYDROXY: CPT

## 2022-09-20 NOTE — PROGRESS NOTES
Living Well with Diabetes Group Class #4    Maral Covarrubias attended the Living Well with Diabetes Group Class #4  During class, Genevieve Abdullahi was instructed on the following topics: Types of cholesterol, dietary sources of cholesterol, types of fat, types of fiber, reading food labels- in depth, healthy choices when dining out  Genevieve Abdullahi participated in group activities of reading labels together and completed the dining out activity  Genevieve Abdullahi will follow up with class #5 or additional DSMT/MNT as desired  Will call with any questions or concerns prior to next session  The patient's history was reviewed and updated as appropriate: allergies, current medications  Lab Results   Component Value Date    HGBA1C 8 5 (A) 08/24/2022       Diabetes Education Record  Genevieve Abdullahi was provided Living Well with Diabetes Class #4 book, pt is doing well with diabetes management  Provided BG meter for review  Reviewed hypoglycemic events and process for management  Pt has many labs ordered, reviewed importance of labwork in management of  Diabetes and overall health  Will obtain  Most recent A1c down to 8 5% from 10 8% in January of 2022  Will follow up with patient in 3 months  Patient response to instruction    Comprehensiongood  Motivationgood  Expected Compliancegood    Begin Time: 11:00am  End Time: 12:00pm  Referring Provider: Phuong Ribera    Thank you for referring your patient to [facility], it was a pleasure working with them today  Please feel free to call with any questions or concerns      Yoli Castaneda RD, LDN, [address] [address] [phone]

## 2022-09-21 DIAGNOSIS — E55.9 VITAMIN D DEFICIENCY: ICD-10-CM

## 2022-09-21 LAB
PSA FREE MFR SERPL: 30.8 %
PSA FREE SERPL-MCNC: 0.4 NG/ML
PSA SERPL-MCNC: 1.3 NG/ML (ref 0–4)

## 2022-09-21 RX ORDER — MELATONIN
1000 DAILY
Qty: 90 TABLET | Refills: 2 | Status: SHIPPED | OUTPATIENT
Start: 2022-09-21 | End: 2022-09-28 | Stop reason: SDUPTHER

## 2022-09-26 ENCOUNTER — PATIENT OUTREACH (OUTPATIENT)
Dept: FAMILY MEDICINE CLINIC | Facility: CLINIC | Age: 67
End: 2022-09-26

## 2022-09-26 NOTE — PROGRESS NOTES
I called the patient but received voicemail  Message was left stating I will call back at another time  The patient returned my call  He states he has been doing well  He notes he is very proud of himself for decreasing his A1C to 8 5 from 10 8  I also congratulated him and asked him to keep up the good work  The patient states he is taking his medications as prescribed  He notes he really likes the [employer] CGM as it is helping him have better control  The patient also notes the diabetes education classes helped as well  The patient reports most of readings are in the low 100's but states he occasionally will get a reading >200 if he does not adhere to his diet  The patient was having a history of low readings in the morning (lowest 59 with symptoms of fatigue and increased hunger)  He notes he is now having a snack prior to bed or eating his dinner later in the evening  For any low readings the patient states he drinks orange juice with improvement  The patient states he has increased his water intake and is walking  He notes he checks his feet daily and denies any skin breakdown  The patient reports inability to sleep throughout the night but is taking trazodone  The patient states he cancelled his Endo appointment (chart states he no-showed)  He questioned why he needs to see them  I encouraged him to reschedule  I will continue to follow

## 2022-09-28 ENCOUNTER — PATIENT OUTREACH (OUTPATIENT)
Dept: FAMILY MEDICINE CLINIC | Facility: CLINIC | Age: 67
End: 2022-09-28

## 2022-09-28 DIAGNOSIS — F41.9 ANXIETY: ICD-10-CM

## 2022-09-28 DIAGNOSIS — E55.9 VITAMIN D DEFICIENCY: ICD-10-CM

## 2022-09-28 DIAGNOSIS — Z79.4 TYPE 2 DIABETES MELLITUS WITH OTHER SPECIFIED COMPLICATION, WITH LONG-TERM CURRENT USE OF INSULIN (HCC): ICD-10-CM

## 2022-09-28 DIAGNOSIS — E11.69 TYPE 2 DIABETES MELLITUS WITH OTHER SPECIFIED COMPLICATION, WITH LONG-TERM CURRENT USE OF INSULIN (HCC): ICD-10-CM

## 2022-09-28 DIAGNOSIS — A04.8 H. PYLORI INFECTION: ICD-10-CM

## 2022-09-28 DIAGNOSIS — E11.49 OTHER DIABETIC NEUROLOGICAL COMPLICATION ASSOCIATED WITH TYPE 2 DIABETES MELLITUS (HCC): ICD-10-CM

## 2022-09-28 RX ORDER — TRAZODONE HYDROCHLORIDE 50 MG/1
50 TABLET ORAL
Qty: 30 TABLET | Refills: 5 | Status: SHIPPED | OUTPATIENT
Start: 2022-09-28

## 2022-09-28 RX ORDER — GABAPENTIN 100 MG/1
100 CAPSULE ORAL 3 TIMES DAILY
Qty: 90 CAPSULE | Refills: 1 | Status: SHIPPED | OUTPATIENT
Start: 2022-09-28

## 2022-09-28 RX ORDER — MELATONIN
1000 DAILY
Qty: 90 TABLET | Refills: 2 | Status: SHIPPED | OUTPATIENT
Start: 2022-09-28

## 2022-09-28 RX ORDER — PANTOPRAZOLE SODIUM 40 MG/1
TABLET, DELAYED RELEASE ORAL
Qty: 28 TABLET | Refills: 0 | Status: SHIPPED | OUTPATIENT
Start: 2022-09-28 | End: 2022-10-09 | Stop reason: SDUPTHER

## 2022-09-28 RX ORDER — ASPIRIN 81 MG/1
81 TABLET ORAL DAILY
Qty: 90 TABLET | Refills: 2 | Status: SHIPPED | OUTPATIENT
Start: 2022-09-28

## 2022-09-28 NOTE — PROGRESS NOTES
I spoke with the patient who states his pharmacy closed and he will need new prescriptions sent to his new pharmacy (per request of the pharmacy)  I updated the new pharmacy in the chart  I will submit requests to the PCP and continue to follow

## 2022-10-09 DIAGNOSIS — A04.8 H. PYLORI INFECTION: ICD-10-CM

## 2022-10-09 RX ORDER — PANTOPRAZOLE SODIUM 40 MG/1
TABLET, DELAYED RELEASE ORAL
Qty: 28 TABLET | Refills: 0 | Status: SHIPPED | OUTPATIENT
Start: 2022-10-09 | End: 2022-10-27

## 2022-10-10 ENCOUNTER — TELEPHONE (OUTPATIENT)
Dept: NEUROLOGY | Facility: CLINIC | Age: 67
End: 2022-10-10

## 2022-10-10 DIAGNOSIS — E11.69 TYPE 2 DIABETES MELLITUS WITH OTHER SPECIFIED COMPLICATION, UNSPECIFIED WHETHER LONG TERM INSULIN USE (HCC): ICD-10-CM

## 2022-10-10 RX ORDER — GABAPENTIN 800 MG/1
800 TABLET ORAL 3 TIMES DAILY
Qty: 90 TABLET | Refills: 5 | Status: SHIPPED | OUTPATIENT
Start: 2022-10-10

## 2022-10-10 NOTE — TELEPHONE ENCOUNTER
Called and spoke to patient - confirmed upcoming appointment with Tylor Henry on 10/21/22 1:00 pm at the [facility] office  Provided patient with apt date, time and location  Informed patient that check in is at least 15 minutes prior to apt time  The patient is not  having any issues or concerns at this time

## 2022-10-12 PROBLEM — Z12.5 PROSTATE CANCER SCREENING: Status: RESOLVED | Noted: 2021-03-31 | Resolved: 2022-10-12

## 2022-10-20 ENCOUNTER — PATIENT OUTREACH (OUTPATIENT)
Dept: FAMILY MEDICINE CLINIC | Facility: CLINIC | Age: 67
End: 2022-10-20

## 2022-10-20 NOTE — PROGRESS NOTES
I called the patient to follow up  He states he has been feeling well  He reports his blood sugars were recently out of control which he attributes to poor dietary choices (as high as 364)  He states his neighbor cooked meals for him and he admits he ate large portion sizes of carbs  He has since stopped and his sugars have improved (he did not provide any readings)  I provided support and encouraged him to be mindful of his portion sizes as we do not want his A1C creeping back up and he agreed  The patient states he has been checking his feet daily and denies any skin breakdown  The patient reports he continues with muscle pain  He has been taking his gabapentin with slight improvement as well as using Voltaren cream   The patient is aware of his Neuro appointment for tomorrow and plans on attending  The patient stated his wife presented him with divorce papers last week but has accepted it and states he feels "free"  The patient stated he will be cancelling his Sleep Study  I will continue to follow  The patient will call with any questions or concerns

## 2022-10-21 ENCOUNTER — TELEPHONE (OUTPATIENT)
Dept: NEUROLOGY | Facility: CLINIC | Age: 67
End: 2022-10-21

## 2022-10-21 NOTE — TELEPHONE ENCOUNTER
pt left message stating that he is going to be late  [phone]    called pt, he states that he fell asleep and he is going to be late for his appt  appt schedule for 1pm today  We called over to [facility]  and they checked with the provider and if pt is going to be more than 20 minutes late, he would have to reschedule  Pt lives about 15 min from the office and needs to take an uber         will reschedule appt

## 2022-10-27 DIAGNOSIS — A04.8 H. PYLORI INFECTION: ICD-10-CM

## 2022-10-27 RX ORDER — PANTOPRAZOLE SODIUM 40 MG/1
TABLET, DELAYED RELEASE ORAL
Qty: 28 TABLET | Refills: 0 | Status: SHIPPED | OUTPATIENT
Start: 2022-10-27

## 2022-10-31 DIAGNOSIS — E11.69 TYPE 2 DIABETES MELLITUS WITH OTHER SPECIFIED COMPLICATION, UNSPECIFIED WHETHER LONG TERM INSULIN USE (HCC): ICD-10-CM

## 2022-11-07 ENCOUNTER — TELEPHONE (OUTPATIENT)
Dept: SLEEP CENTER | Facility: CLINIC | Age: 67
End: 2022-11-07

## 2022-11-07 NOTE — TELEPHONE ENCOUNTER
ARNOL- Pt canceled Sleep Study for tonight and he doesn't want to reschedule  He is not interested in following up with Sleep Issues

## 2022-11-21 DIAGNOSIS — A04.8 H. PYLORI INFECTION: ICD-10-CM

## 2022-11-21 RX ORDER — PANTOPRAZOLE SODIUM 40 MG/1
TABLET, DELAYED RELEASE ORAL
Qty: 28 TABLET | Refills: 0 | OUTPATIENT
Start: 2022-11-21

## 2022-11-21 NOTE — TELEPHONE ENCOUNTER
Patient does not require a refill since this was for H pylori 2 week treatment  Please schedule office follow up with a PA in 8 weeks

## 2022-11-23 ENCOUNTER — RA CDI HCC (OUTPATIENT)
Dept: OTHER | Facility: HOSPITAL | Age: 67
End: 2022-11-23

## 2022-11-23 ENCOUNTER — PATIENT OUTREACH (OUTPATIENT)
Dept: FAMILY MEDICINE CLINIC | Facility: CLINIC | Age: 67
End: 2022-11-23

## 2022-11-23 DIAGNOSIS — E11.69 TYPE 2 DIABETES MELLITUS WITH OTHER SPECIFIED COMPLICATION, UNSPECIFIED WHETHER LONG TERM INSULIN USE (HCC): ICD-10-CM

## 2022-11-23 NOTE — PROGRESS NOTES
[facility]  coding opportunities          Chart Reviewed number of suggestions sent to Provider: 3   E11 65  E11 40  E11 29, R80 9    Patients Insurance     Medicare Insurance: [employer]

## 2022-11-23 NOTE — PROGRESS NOTES
I returned the patient's call  He is requesting refills of his Freestyle sensors; will submit to PCP  I reminded the patient of his PCP appointment next week but he requests another reminder will; will call Monday

## 2022-11-25 ENCOUNTER — TELEPHONE (OUTPATIENT)
Dept: FAMILY MEDICINE CLINIC | Facility: CLINIC | Age: 67
End: 2022-11-25

## 2022-11-25 DIAGNOSIS — E11.69 TYPE 2 DIABETES MELLITUS WITH OTHER SPECIFIED COMPLICATION, UNSPECIFIED WHETHER LONG TERM INSULIN USE (HCC): ICD-10-CM

## 2022-11-25 NOTE — TELEPHONE ENCOUNTER
[facility]  Anatoly Galindo, Pharmacist    Recommendation: Consider prescribing a statin such as atorvastatin to mitigate the risk of serious cardiovascular events  Reason for Documentation: Patient has been identified as having a history of Type 2 Diabetes, aged 43-69 years without ASCVD with LDL- C  mg/dL, and not currently being on statin therapy  The ADA Standards of Care recommend moderate-intensity statin therapy in patients with diabetes, or high-intensity statin therapy in those at higher risk (with 10-year ASCVD risk of 20% or greater)  This is recommended whether or not he has elevated cholesterol     The 10-year ASCVD risk score (Juan TORRES, et al , 2019) is: 20 3%    Values used to calculate the score:      Age: 78 years      Sex: Male      Is Non- : Yes      Diabetic: Yes      Tobacco smoker: Yes      Systolic Blood Pressure: 734 mmHg      Is BP treated: No      HDL Cholesterol: 59 mg/dL      Total Cholesterol: 231 mg/dL    Previous statin trial: atorvastatin 10 mg  Most recent lipid panel: 9/2022    Lab Results   Component Value Date    LDLCALC 134 (H) 09/20/2022    LDLCALC 124 (H) 04/03/2021     No results found for: LDLDIRECT    Lab Results   Component Value Date    ALT 28 09/20/2022    AST 16 09/20/2022    ALKPHOS 76 09/20/2022       PCP: Please sign pended medication order if recommendation is accepted or remove/ cancel medication order if you do not agree (do not select "reject")  If accepted clinical pharmacist to provide education to patient pending your decision      Pharmacist Tracking Tool  Reason For Outreach: Population Health Pharmacist  Demographics:  Intervention Method: Chart Review  Type of Intervention: New  Topics Addressed: Quality measures  Pharmacologic Interventions: Medication Initiation  Non-Pharmacologic Interventions: Care coordination and Care Gap  Time:  Direct Patient Care: 0 mins  Care Coordination: 10 mins  Recommendation Recipient: Provider  Outcome: Accepted

## 2022-11-28 ENCOUNTER — PATIENT OUTREACH (OUTPATIENT)
Dept: FAMILY MEDICINE CLINIC | Facility: CLINIC | Age: 67
End: 2022-11-28

## 2022-11-28 RX ORDER — FLASH GLUCOSE SENSOR
1 KIT MISCELLANEOUS CONTINUOUS
Qty: 6 EACH | Refills: 1 | Status: SHIPPED | OUTPATIENT
Start: 2022-11-28 | End: 2023-11-28

## 2022-11-29 ENCOUNTER — OFFICE VISIT (OUTPATIENT)
Dept: FAMILY MEDICINE CLINIC | Facility: CLINIC | Age: 67
End: 2022-11-29

## 2022-11-29 VITALS
OXYGEN SATURATION: 95 % | SYSTOLIC BLOOD PRESSURE: 128 MMHG | HEIGHT: 60 IN | WEIGHT: 133.3 LBS | TEMPERATURE: 97.9 F | RESPIRATION RATE: 16 BRPM | HEART RATE: 104 BPM | DIASTOLIC BLOOD PRESSURE: 84 MMHG | BODY MASS INDEX: 26.17 KG/M2

## 2022-11-29 DIAGNOSIS — Z28.20 VACCINE REFUSED BY PATIENT: ICD-10-CM

## 2022-11-29 DIAGNOSIS — Z00.00 MEDICARE ANNUAL WELLNESS VISIT, INITIAL: ICD-10-CM

## 2022-11-29 DIAGNOSIS — E11.49 OTHER DIABETIC NEUROLOGICAL COMPLICATION ASSOCIATED WITH TYPE 2 DIABETES MELLITUS (HCC): Primary | ICD-10-CM

## 2022-11-29 DIAGNOSIS — E11.69 TYPE 2 DIABETES MELLITUS WITH OTHER SPECIFIED COMPLICATION, UNSPECIFIED WHETHER LONG TERM INSULIN USE (HCC): ICD-10-CM

## 2022-11-29 RX ORDER — ATORVASTATIN CALCIUM 10 MG/1
10 TABLET, FILM COATED ORAL DAILY
Qty: 90 TABLET | Refills: 3 | Status: SHIPPED | OUTPATIENT
Start: 2022-11-29 | End: 2023-11-24

## 2022-11-29 NOTE — ASSESSMENT & PLAN NOTE
Lab Results   Component Value Date    HGBA1C 8 5 (A) 08/24/2022   Switched to Trulicity  Stop Januvia  Continue Metformin  Follow up with Endo as scheduled

## 2022-11-29 NOTE — PATIENT INSTRUCTIONS
Medicare Preventive Visit Patient Instructions  Thank you for completing your Welcome to Medicare Visit or Medicare Annual Wellness Visit today  Your next wellness visit will be due in one year (11/30/2023)  The screening/preventive services that you may require over the next 5-10 years are detailed below  Some tests may not apply to you based off risk factors and/or age  Screening tests ordered at today's visit but not completed yet may show as past due  Also, please note that scanned in results may not display below  Preventive Screenings:  Service Recommendations Previous Testing/Comments   Colorectal Cancer Screening  · Colonoscopy    · Fecal Occult Blood Test (FOBT)/Fecal Immunochemical Test (FIT)  · Fecal DNA/Cologuard Test  · Flexible Sigmoidoscopy Age: 39-70 years old   Colonoscopy: every 10 years (May be performed more frequently if at higher risk)  OR  FOBT/FIT: every 1 year  OR  Cologuard: every 3 years  OR  Sigmoidoscopy: every 5 years  Screening may be recommended earlier than age 39 if at higher risk for colorectal cancer  Also, an individualized decision between you and your healthcare provider will decide whether screening between the ages of 74-80 would be appropriate   Colonoscopy: 08/11/2022  FOBT/FIT: Not on file  Cologuard: Not on file  Sigmoidoscopy: Not on file    Screening Current     Prostate Cancer Screening Individualized decision between patient and health care provider in men between ages of 53-78   Medicare will cover every 12 months beginning on the day after your 50th birthday PSA: 1 3 ng/mL     Screening Current     Hepatitis C Screening Once for adults born between 1945 and 1965  More frequently in patients at high risk for Hepatitis C Hep C Antibody: Not on file        Diabetes Screening 1-2 times per year if you're at risk for diabetes or have pre-diabetes Fasting glucose: 289 mg/dL (9/20/2022)  A1C: 8 5 (8/24/2022)  Screening Not Indicated  History Diabetes   Cholesterol Screening Once every 5 years if you don't have a lipid disorder  May order more often based on risk factors  Lipid panel: 09/20/2022  Screening Not Indicated  History Lipid Disorder      Other Preventive Screenings Covered by Medicare:  1  Abdominal Aortic Aneurysm (AAA) Screening: covered once if your at risk  You're considered to be at risk if you have a family history of AAA or a male between the age of 73-68 who smoking at least 100 cigarettes in your lifetime  2  Lung Cancer Screening: covers low dose CT scan once per year if you meet all of the following conditions: (1) Age 50-69; (2) No signs or symptoms of lung cancer; (3) Current smoker or have quit smoking within the last 15 years; (4) You have a tobacco smoking history of at least 20 pack years (packs per day x number of years you smoked); (5) You get a written order from a healthcare provider  3  Glaucoma Screening: covered annually if you're considered high risk: (1) You have diabetes OR (2) Family history of glaucoma OR (3)  aged 48 and older OR (3)  American aged 72 and older  3  Osteoporosis Screening: covered every 2 years if you meet one of the following conditions: (1) Have a vertebral abnormality; (2) On glucocorticoid therapy for more than 3 months; (3) Have primary hyperparathyroidism; (4) On osteoporosis medications and need to assess response to drug therapy  5  HIV Screening: covered annually if you're between the age of 12-76  Also covered annually if you are younger than 13 and older than 72 with risk factors for HIV infection  For pregnant patients, it is covered up to 3 times per pregnancy      Immunizations:  Immunization Recommendations   Influenza Vaccine Annual influenza vaccination during flu season is recommended for all persons aged >= 6 months who do not have contraindications   Pneumococcal Vaccine   * Pneumococcal conjugate vaccine = PCV13 (Prevnar 13), PCV15 (Vaxneuvance), PCV20 (Prevnar 20)  * Pneumococcal polysaccharide vaccine = PPSV23 (Pneumovax) Adults 2364 years old: 1-3 doses may be recommended based on certain risk factors  Adults 72 years old: 1-2 doses may be recommended based off what pneumonia vaccine you previously received   Hepatitis B Vaccine 3 dose series if at intermediate or high risk (ex: diabetes, end stage renal disease, liver disease)   Tetanus (Td) Vaccine - COST NOT COVERED BY MEDICARE PART B Following completion of primary series, a booster dose should be given every 10 years to maintain immunity against tetanus  Td may also be given as tetanus wound prophylaxis  Tdap Vaccine - COST NOT COVERED BY MEDICARE PART B Recommended at least once for all adults  For pregnant patients, recommended with each pregnancy  Shingles Vaccine (Shingrix) - COST NOT COVERED BY MEDICARE PART B  2 shot series recommended in those aged 48 and above     Health Maintenance Due:      Topic Date Due   • Hepatitis C Screening  Never done   • Colorectal Cancer Screening  Never done     Immunizations Due:      Topic Date Due   • Hepatitis A Vaccine (1 of 2 - Risk 2-dose series) Never done   • Pneumococcal Vaccine: 65+ Years (1 - PCV) Never done   • Hepatitis B Vaccine (1 of 3 - Risk 3-dose series) Never done   • COVID-19 Vaccine (3 - Booster for Moderna series) 11/02/2021   • Influenza Vaccine (1) Never done     Advance Directives   What are advance directives? Advance directives are legal documents that state your wishes and plans for medical care  These plans are made ahead of time in case you lose your ability to make decisions for yourself  Advance directives can apply to any medical decision, such as the treatments you want, and if you want to donate organs  What are the types of advance directives? There are many types of advance directives, and each state has rules about how to use them  You may choose a combination of any of the following:  · Living will:   This is a written record of the treatment you want  You can also choose which treatments you do not want, which to limit, and which to stop at a certain time  This includes surgery, medicine, IV fluid, and tube feedings  · Durable power of  for healthcare [facility]): This is a written record that states who you want to make healthcare choices for you when you are unable to make them for yourself  This person, called a proxy, is usually a family member or a friend  You may choose more than 1 proxy  · Do not resuscitate (DNR) order:  A DNR order is used in case your heart stops beating or you stop breathing  It is a request not to have certain forms of treatment, such as CPR  A DNR order may be included in other types of advance directives  · Medical directive: This covers the care that you want if you are in a coma, near death, or unable to make decisions for yourself  You can list the treatments you want for each condition  Treatment may include pain medicine, surgery, blood transfusions, dialysis, IV or tube feedings, and a ventilator (breathing machine)  · Values history: This document has questions about your views, beliefs, and how you feel and think about life  This information can help others choose the care that you would choose  Why are advance directives important? An advance directive helps you control your care  Although spoken wishes may be used, it is better to have your wishes written down  Spoken wishes can be misunderstood, or not followed  Treatments may be given even if you do not want them  An advance directive may make it easier for your family to make difficult choices about your care  Cigarette Smoking and Your Health   Risks to your health if you smoke:  Nicotine and other chemicals found in tobacco damage every cell in your body  Even if you are a light smoker, you have an increased risk for cancer, heart disease, and lung disease   If you are pregnant or have diabetes, smoking increases your risk for complications  Benefits to your health if you stop smoking:   · You decrease respiratory symptoms such as coughing, wheezing, and shortness of breath  · You reduce your risk for cancers of the lung, mouth, throat, kidney, bladder, pancreas, stomach, and cervix  If you already have cancer, you increase the benefits of chemotherapy  You also reduce your risk for cancer returning or a second cancer from developing  · You reduce your risk for heart disease, blood clots, heart attack, and stroke  · You reduce your risk for lung infections, and diseases such as pneumonia, asthma, chronic bronchitis, and emphysema  · Your circulation improves  More oxygen can be delivered to your body  If you have diabetes, you lower your risk for complications, such as kidney, artery, and eye diseases  You also lower your risk for nerve damage  Nerve damage can lead to amputations, poor vision, and blindness  · You improve your body's ability to heal and to fight infections  For more information and support to stop smoking:   · [employer]  Phone: 2- 521 - 673-2588  Web Address: [employer]  Weight Management   Why it is important to manage your weight:  Being overweight increases your risk of health conditions such as heart disease, high blood pressure, type 2 diabetes, and certain types of cancer  It can also increase your risk for osteoarthritis, sleep apnea, and other respiratory problems  Aim for a slow, steady weight loss  Even a small amount of weight loss can lower your risk of health problems  How to lose weight safely:  A safe and healthy way to lose weight is to eat fewer calories and get regular exercise  You can lose up about 1 pound a week by decreasing the number of calories you eat by 500 calories each day  Healthy meal plan for weight management:  A healthy meal plan includes a variety of foods, contains fewer calories, and helps you stay healthy   A healthy meal plan includes the following:  · Eat whole-grain foods more often  A healthy meal plan should contain fiber  Fiber is the part of grains, fruits, and vegetables that is not broken down by your body  Whole-grain foods are healthy and provide extra fiber in your diet  Some examples of whole-grain foods are whole-wheat breads and pastas, oatmeal, brown rice, and bulgur  · Eat a variety of vegetables every day  Include dark, leafy greens such as spinach, kale, clif greens, and mustard greens  Eat yellow and orange vegetables such as carrots, sweet potatoes, and winter squash  · Eat a variety of fruits every day  Choose fresh or canned fruit (canned in its own juice or light syrup) instead of juice  Fruit juice has very little or no fiber  · Eat low-fat dairy foods  Drink fat-free (skim) milk or 1% milk  Eat fat-free yogurt and low-fat cottage cheese  Try low-fat cheeses such as mozzarella and other reduced-fat cheeses  · Choose meat and other protein foods that are low in fat  Choose beans or other legumes such as split peas or lentils  Choose fish, skinless poultry (chicken or turkey), or lean cuts of red meat (beef or pork)  Before you cook meat or poultry, cut off any visible fat  · Use less fat and oil  Try baking foods instead of frying them  Add less fat, such as margarine, sour cream, regular salad dressing and mayonnaise to foods  Eat fewer high-fat foods  Some examples of high-fat foods include french fries, doughnuts, ice cream, and cakes  · Eat fewer sweets  Limit foods and drinks that are high in sugar  This includes candy, cookies, regular soda, and sweetened drinks  Exercise:  Exercise at least 30 minutes per day on most days of the week  Some examples of exercise include walking, biking, dancing, and swimming  You can also fit in more physical activity by taking the stairs instead of the elevator or parking farther away from stores  Ask your healthcare provider about the best exercise plan for you        © Copyright [employer] 2018 Information is for [employer] use only and may not be sold, redistributed or otherwise used for commercial purposes  All illustrations and images included in CareNotes® are the copyrighted property of [employer]  or [facility] Preventive Visit Patient Instructions  Thank you for completing your Welcome to Medicare Visit or Medicare Annual Wellness Visit today  Your next wellness visit will be due in one year (11/30/2023)  The screening/preventive services that you may require over the next 5-10 years are detailed below  Some tests may not apply to you based off risk factors and/or age  Screening tests ordered at today's visit but not completed yet may show as past due  Also, please note that scanned in results may not display below  Preventive Screenings:  Service Recommendations Previous Testing/Comments   Colorectal Cancer Screening  · Colonoscopy    · Fecal Occult Blood Test (FOBT)/Fecal Immunochemical Test (FIT)  · Fecal DNA/Cologuard Test  · Flexible Sigmoidoscopy Age: 39-70 years old   Colonoscopy: every 10 years (May be performed more frequently if at higher risk)  OR  FOBT/FIT: every 1 year  OR  Cologuard: every 3 years  OR  Sigmoidoscopy: every 5 years  Screening may be recommended earlier than age 39 if at higher risk for colorectal cancer  Also, an individualized decision between you and your healthcare provider will decide whether screening between the ages of 74-80 would be appropriate   Colonoscopy: 08/11/2022  FOBT/FIT: Not on file  Cologuard: Not on file  Sigmoidoscopy: Not on file    Screening Current     Prostate Cancer Screening Individualized decision between patient and health care provider in men between ages of 53-78   Medicare will cover every 12 months beginning on the day after your 50th birthday PSA: 1 3 ng/mL     Screening Current     Hepatitis C Screening Once for adults born between [facility]  More frequently in patients at high risk for Hepatitis C Hep C Antibody: Not on file        Diabetes Screening 1-2 times per year if you're at risk for diabetes or have pre-diabetes Fasting glucose: 289 mg/dL (9/20/2022)  A1C: 8 5 (8/24/2022)  Screening Not Indicated  History Diabetes   Cholesterol Screening Once every 5 years if you don't have a lipid disorder  May order more often based on risk factors  Lipid panel: 09/20/2022  Screening Not Indicated  History Lipid Disorder      Other Preventive Screenings Covered by Medicare:  6  Abdominal Aortic Aneurysm (AAA) Screening: covered once if your at risk  You're considered to be at risk if you have a family history of AAA or a male between the age of 73-68 who smoking at least 100 cigarettes in your lifetime  7  Lung Cancer Screening: covers low dose CT scan once per year if you meet all of the following conditions: (1) Age 50-69; (2) No signs or symptoms of lung cancer; (3) Current smoker or have quit smoking within the last 15 years; (4) You have a tobacco smoking history of at least 20 pack years (packs per day x number of years you smoked); (5) You get a written order from a healthcare provider  8  Glaucoma Screening: covered annually if you're considered high risk: (1) You have diabetes OR (2) Family history of glaucoma OR (3)  aged 48 and older OR (3)  American aged 72 and older  5  Osteoporosis Screening: covered every 2 years if you meet one of the following conditions: (1) Have a vertebral abnormality; (2) On glucocorticoid therapy for more than 3 months; (3) Have primary hyperparathyroidism; (4) On osteoporosis medications and need to assess response to drug therapy  10  HIV Screening: covered annually if you're between the age of 12-76  Also covered annually if you are younger than 13 and older than 72 with risk factors for HIV infection  For pregnant patients, it is covered up to 3 times per pregnancy      Immunizations:  Immunization Recommendations   Influenza Vaccine Annual influenza vaccination during flu season is recommended for all persons aged >= 6 months who do not have contraindications   Pneumococcal Vaccine   * Pneumococcal conjugate vaccine = PCV13 (Prevnar 13), PCV15 (Vaxneuvance), PCV20 (Prevnar 20)  * Pneumococcal polysaccharide vaccine = PPSV23 (Pneumovax) Adults 25-60 years old: 1-3 doses may be recommended based on certain risk factors  Adults 72 years old: 1-2 doses may be recommended based off what pneumonia vaccine you previously received   Hepatitis B Vaccine 3 dose series if at intermediate or high risk (ex: diabetes, end stage renal disease, liver disease)   Tetanus (Td) Vaccine - COST NOT COVERED BY MEDICARE PART B Following completion of primary series, a booster dose should be given every 10 years to maintain immunity against tetanus  Td may also be given as tetanus wound prophylaxis  Tdap Vaccine - COST NOT COVERED BY MEDICARE PART B Recommended at least once for all adults  For pregnant patients, recommended with each pregnancy  Shingles Vaccine (Shingrix) - COST NOT COVERED BY MEDICARE PART B  2 shot series recommended in those aged 48 and above     Health Maintenance Due:      Topic Date Due   • Hepatitis C Screening  Never done   • Colorectal Cancer Screening  Never done     Immunizations Due:  There are no preventive care reminders to display for this patient  Advance Directives   What are advance directives? Advance directives are legal documents that state your wishes and plans for medical care  These plans are made ahead of time in case you lose your ability to make decisions for yourself  Advance directives can apply to any medical decision, such as the treatments you want, and if you want to donate organs  What are the types of advance directives? There are many types of advance directives, and each state has rules about how to use them  You may choose a combination of any of the following:  · Living will:   This is a written record of the treatment you want  You can also choose which treatments you do not want, which to limit, and which to stop at a certain time  This includes surgery, medicine, IV fluid, and tube feedings  · Durable power of  for healthcare [place] [facility]): This is a written record that states who you want to make healthcare choices for you when you are unable to make them for yourself  This person, called a proxy, is usually a family member or a friend  You may choose more than 1 proxy  · Do not resuscitate (DNR) order:  A DNR order is used in case your heart stops beating or you stop breathing  It is a request not to have certain forms of treatment, such as CPR  A DNR order may be included in other types of advance directives  · Medical directive: This covers the care that you want if you are in a coma, near death, or unable to make decisions for yourself  You can list the treatments you want for each condition  Treatment may include pain medicine, surgery, blood transfusions, dialysis, IV or tube feedings, and a ventilator (breathing machine)  · Values history: This document has questions about your views, beliefs, and how you feel and think about life  This information can help others choose the care that you would choose  Why are advance directives important? An advance directive helps you control your care  Although spoken wishes may be used, it is better to have your wishes written down  Spoken wishes can be misunderstood, or not followed  Treatments may be given even if you do not want them  An advance directive may make it easier for your family to make difficult choices about your care  Cigarette Smoking and Your Health   Risks to your health if you smoke:  Nicotine and other chemicals found in tobacco damage every cell in your body  Even if you are a light smoker, you have an increased risk for cancer, heart disease, and lung disease   If you are pregnant or have diabetes, smoking increases your risk for complications  Benefits to your health if you stop smoking:   · You decrease respiratory symptoms such as coughing, wheezing, and shortness of breath  · You reduce your risk for cancers of the lung, mouth, throat, kidney, bladder, pancreas, stomach, and cervix  If you already have cancer, you increase the benefits of chemotherapy  You also reduce your risk for cancer returning or a second cancer from developing  · You reduce your risk for heart disease, blood clots, heart attack, and stroke  · You reduce your risk for lung infections, and diseases such as pneumonia, asthma, chronic bronchitis, and emphysema  · Your circulation improves  More oxygen can be delivered to your body  If you have diabetes, you lower your risk for complications, such as kidney, artery, and eye diseases  You also lower your risk for nerve damage  Nerve damage can lead to amputations, poor vision, and blindness  · You improve your body's ability to heal and to fight infections  For more information and support to stop smoking:   · [employer]  Phone: 7- 545 - 954-5285  Web Address: [employer]  Weight Management   Why it is important to manage your weight:  Being overweight increases your risk of health conditions such as heart disease, high blood pressure, type 2 diabetes, and certain types of cancer  It can also increase your risk for osteoarthritis, sleep apnea, and other respiratory problems  Aim for a slow, steady weight loss  Even a small amount of weight loss can lower your risk of health problems  How to lose weight safely:  A safe and healthy way to lose weight is to eat fewer calories and get regular exercise  You can lose up about 1 pound a week by decreasing the number of calories you eat by 500 calories each day  Healthy meal plan for weight management:  A healthy meal plan includes a variety of foods, contains fewer calories, and helps you stay healthy   A healthy meal plan includes the following:  · [employer] 2018 Information is for [employer] use only and may not be sold, redistributed or otherwise used for commercial purposes   All illustrations and images included in CareNotes® are the copyrighted property of [employer]  or [address] [place], [place] [address]seng balbina

## 2022-11-29 NOTE — PROGRESS NOTES
Assessment and Plan:     Problem List Items Addressed This Visit        Endocrine    Diabetic neuropathy ([facility] [address] ) - Primary    Relevant Medications    Dulaglutide 0 75 MG/0 5ML SOPN    metFORMIN (GLUCOPHAGE) 1000 MG tablet    Type 2 diabetes mellitus with other specified complication (HCC)       Lab Results   Component Value Date    HGBA1C 8 5 (A) 08/24/2022   Switched to Trulicity  Stop Januvia  Continue Metformin  Follow up with Endo as scheduled          Relevant Medications    Dulaglutide 0 75 MG/0 5ML SOPN    metFORMIN (GLUCOPHAGE) 1000 MG tablet       Other    Vaccine refused by patient     Patient refused season and age appropriate vaccines at this time         Other Visit Diagnoses     Medicare annual wellness visit, initial               Preventive health issues were discussed with patient, and age appropriate screening tests were ordered as noted in patient's After Visit Summary  Personalized health advice and appropriate referrals for health education or preventive services given if needed, as noted in patient's After Visit Summary  History of Present Illness:     Patient presents for a Medicare Wellness Visit    77 yo male here today for AWV and DM follow up  Admits he does not use his insulin every night since he is afraid of needles  States he does not exercise and does not follow any particular diet   Reports: "I had a relapse and had 3 weeks of FBG over 300"   Smoking about 6 cigarettes a day   Complains of burning like sensation and pain in both feet      Patient Care Team:  Yumi Velasquez MD as PCP - General (Family Medicine)  Maria L Saxena RN as Lead OP Care Mgr     Review of Systems:     Review of Systems   Musculoskeletal: Positive for arthralgias  Psychiatric/Behavioral: Positive for sleep disturbance  All other systems reviewed and are negative         Problem List:     Patient Active Problem List   Diagnosis   • Type 2 diabetes mellitus with other specified complication ([facility] [address] )   • Diabetic neuropathy (HCC)   • History of heroin abuse ([place] [address] )   • Anxiety   • Chronic gastritis   • Asthma due to environmental allergies   • JESSICA (obstructive sleep apnea)   • Erectile dysfunction due to diseases classified elsewhere   • Bunion, right   • Smoker   • Functional diarrhea   • Chronic neuropathic pain   • Pure hypercholesterolemia   • Muscle spasm   • Vaccine refused by patient      Past Medical and Surgical History:     Past Medical History:   Diagnosis Date   • Diabetes mellitus ([place] [address] )    • Hyperlipidemia      No past surgical history on file  Family History:     No family history on file  Social History:     Social History     Socioeconomic History   • Marital status: Single     Spouse name: None   • Number of children: None   • Years of education: None   • Highest education level: None   Occupational History   • None   Tobacco Use   • Smoking status: Every Day     Packs/day: 0 25     Types: Cigarettes   • Smokeless tobacco: Never   Vaping Use   • Vaping Use: Never used   Substance and Sexual Activity   • Alcohol use: Not Currently   • Drug use: Never   • Sexual activity: Not Currently   Other Topics Concern   • None   Social History Narrative   • None     Social Determinants of Health     Financial Resource Strain: Low Risk    • Difficulty of Paying Living Expenses: Not very hard   Food Insecurity: Not on file   Transportation Needs: No Transportation Needs   • Lack of Transportation (Medical): No   • Lack of Transportation (Non-Medical):  No   Physical Activity: Not on file   Stress: Not on file   Social Connections: Not on file   Intimate Partner Violence: Not on file   Housing Stability: Not on file      Medications and Allergies:     Current Outpatient Medications   Medication Sig Dispense Refill   • Dulaglutide 0 75 MG/0 5ML SOPN Inject 0 5 mL (0 75 mg total) under the skin once a week 6 mL 0   • metFORMIN (GLUCOPHAGE) 1000 MG tablet Take 1 tablet (1,000 mg total) by mouth 2 (two) times a day with meals 180 tablet 2   • Alcohol Swabs (Alcohol Pads) 70 % PADS Use daily in the early morning 100 each 5   • aspirin (Aspirin Low Dose) 81 mg EC tablet Take 1 tablet (81 mg total) by mouth daily 90 tablet 2   • atorvastatin (LIPITOR) 10 mg tablet Take 1 tablet (10 mg total) by mouth daily 30 tablet 5   • cholecalciferol (VITAMIN D3) 1,000 units tablet Take 1 tablet (1,000 Units total) by mouth daily 90 tablet 2   • Continuous Blood Gluc  (FreeStyle Angella 14 Day [place]) AYLA Use 1 Device continuous 1 each 0   • Continuous Blood Gluc Sensor (FreeStyle Angella 14 Day Sensor) MISC Use 1 Device continuous 6 each 1   • Diclofenac Sodium (VOLTAREN) 1 % Apply 2 g topically 4 (four) times a day 2 g 2   • gabapentin (Neurontin) 100 mg capsule Take 1 capsule (100 mg total) by mouth 3 (three) times a day 90 capsule 1   • gabapentin (NEURONTIN) 800 mg tablet Take 1 tablet (800 mg total) by mouth 3 (three) times a day 90 tablet 5   • glucose blood (FREESTYLE LITE) test strip Check blood sugar 3 times a day 100 each 11   • glucose monitoring kit (FREESTYLE) monitoring kit Use 1 each daily in the early morning 1 each 0   • insulin detemir (Levemir FlexTouch) 100 Units/mL injection pen Inject 15 Units under the skin daily at bedtime 15 mL 2   • Insulin Pen Needle 32G X 4 MM MISC Use daily at bedtime 100 each 5   • Lancets (freestyle) lancets Use as instructed 100 each 5   • pantoprazole (PROTONIX) 40 mg tablet take 1 tablet by mouth twice a day 60 tablet 0   • pantoprazole (PROTONIX) 40 mg tablet take 1 tablet by mouth every 12 hours for 14 days 28 tablet 0   • saccharomyces boulardii (FLORASTOR) 250 mg capsule Take 1 capsule (250 mg total) by mouth 2 (two) times a day 60 capsule 2   • sildenafil (VIAGRA) 25 MG tablet Take 1 tablet (25 mg total) by mouth daily as needed for erectile dysfunction (Patient not taking: No sig reported) 10 tablet 0   • sucralfate (CARAFATE) 1 g tablet take 1 tablet by mouth four times a day 90 tablet 2   • traZODone (DESYREL) 50 mg tablet Take 1 tablet (50 mg total) by mouth daily at bedtime 30 tablet 5     No current facility-administered medications for this visit  No Known Allergies   Immunizations:     Immunization History   Administered Date(s) Administered   • COVID-19 MODERNA VACC 0 5 ML IM 05/05/2021, 06/02/2021      Health Maintenance:         Topic Date Due   • Hepatitis C Screening  Never done   • Colorectal Cancer Screening  Never done     There are no preventive care reminders to display for this patient  Medicare Screening Tests and Risk Assessments:     Darnell Finn is here for his Welcome to Medicare visit  Health Risk Assessment:   Patient rates overall health as very good  Patient feels that their physical health rating is slightly better  Patient is satisfied with their life  Eyesight was rated as slightly worse  Hearing was rated as slightly worse  Patient feels that their emotional and mental health rating is slightly better  Patients states they are never, rarely angry  Patient states they are sometimes unusually tired/fatigued  Pain experienced in the last 7 days has been a lot  Patient's pain rating has been 8/10  Patient states that he has experienced no weight loss or gain in last 6 months  Fall Risk Screening: In the past year, patient has experienced: no history of falling in past year      Home Safety:  Patient has trouble with stairs inside or outside of their home  Patient has working smoke alarms and has working carbon monoxide detector  Home safety hazards include: none  Nutrition:   Current diet is Diabetic and Regular  Medications:   Patient is not currently taking any over-the-counter supplements  Patient is able to manage medications       Activities of Daily Living (ADLs)/Instrumental Activities of Daily Living (IADLs):   Walk and transfer into and out of bed and chair?: Yes  Dress and groom yourself?: Yes    Bathe or shower yourself?: Yes    Feed yourself? Yes  Do your laundry/housekeeping?: Yes  Manage your money, pay your bills and track your expenses?: Yes  Make your own meals?: Yes    Do your own shopping?: Yes    ADL comments: Pt daughter is financial advisory     Previous Hospitalizations:   Any hospitalizations or ED visits within the last 12 months?: No      PREVENTIVE SCREENINGS      Cardiovascular Screening:    General: Screening Not Indicated and History Lipid Disorder      Diabetes Screening:     General: Screening Not Indicated and History Diabetes      Colorectal Cancer Screening:     General: Screening Current      Prostate Cancer Screening:    General: Screening Current      Abdominal Aortic Aneurysm (AAA) Screening:    Risk factors include: age between 73-67 yo and tobacco use        Lung Cancer Screening:     General: Screening Not Indicated    Screening, Brief Intervention, and Referral to Treatment (SBIRT)    Screening  Typical number of drinks in a day: 0  Typical number of drinks in a week: 0  Interpretation: Low risk drinking behavior  Single Item Drug Screening:  How often have you used an illegal drug (including marijuana) or a prescription medication for non-medical reasons in the past year? never    Single Item Drug Screen Score: 0  Interpretation: Negative screen for possible drug use disorder    No results found  Physical Exam:     /84 (BP Location: Left arm, Patient Position: Sitting, Cuff Size: Standard)   Pulse 104   Temp 97 9 °F (36 6 °C) (Temporal)   Resp 16   Ht 4' 11" (1 499 m)   Wt 60 5 kg (133 lb 4 8 oz)   SpO2 95%   BMI 26 92 kg/m²     Physical Exam  Vitals and nursing note reviewed  Constitutional:       General: He is not in acute distress  Appearance: He is well-developed  HENT:      Head: Normocephalic and atraumatic  Eyes:      Conjunctiva/sclera: Conjunctivae normal    Cardiovascular:      Rate and Rhythm: Normal rate and regular rhythm  Heart sounds: No murmur heard    Pulmonary: Effort: Pulmonary effort is normal  No respiratory distress  Breath sounds: Normal breath sounds  Abdominal:      Palpations: Abdomen is soft  Tenderness: There is no abdominal tenderness  Musculoskeletal:         General: No swelling  Cervical back: Neck supple  Skin:     General: Skin is warm and dry  Capillary Refill: Capillary refill takes less than 2 seconds  Neurological:      Mental Status: He is alert     Psychiatric:         Mood and Affect: Mood normal           Joye Apley, MD

## 2022-12-29 ENCOUNTER — PATIENT OUTREACH (OUTPATIENT)
Dept: FAMILY MEDICINE CLINIC | Facility: CLINIC | Age: 67
End: 2022-12-29

## 2022-12-29 NOTE — PROGRESS NOTES
I called the patient to follow up on his blood sugars since Trulicity was started and Januvia was stopped  The patient states he was unaware of discontinuing Januvia and has continued taking it; note sent to PCP  He states he has 2 bottles of Januvia remaining  The patient could not provide any blood sugar readings as he is awaiting a new Freestyle Angella 2; due to arrive today  He notes he does not have a glucometer to check his levels  The patient states he continues checking his feet daily and denies any skin breakdown  I will continue to follow

## 2023-01-06 ENCOUNTER — TELEPHONE (OUTPATIENT)
Dept: NEUROLOGY | Facility: CLINIC | Age: 68
End: 2023-01-06

## 2023-01-06 NOTE — TELEPHONE ENCOUNTER
Called and spoke to patient - confirmed upcoming appointment with Tanya Montoya on 01/20/23 3:00 pm at the [facility] office  Provided patient with apt date, time and location  Informed patient that check in is at least 15 minutes prior to apt time  The patient is not  having any issues or concerns at this time   Mailed appointment card as requested/

## 2023-01-09 ENCOUNTER — PATIENT OUTREACH (OUTPATIENT)
Dept: FAMILY MEDICINE CLINIC | Facility: CLINIC | Age: 68
End: 2023-01-09

## 2023-01-09 NOTE — PROGRESS NOTES
I received a call from the patient stating he received his Kurtz  He reports his blood sugars have been elevated due to admitted dietary noncompliance  The patient notes he has been drinking coffee with sugar  He reports readings to 240  I asked him to obtain a sugar substitute such as Stevia and he agreed  The patient discontinued Januvia and continues taking trulicity and metformin as prescribed  I will continue to follow

## 2023-01-16 ENCOUNTER — TELEPHONE (OUTPATIENT)
Dept: NEUROLOGY | Facility: CLINIC | Age: 68
End: 2023-01-16

## 2023-01-16 NOTE — TELEPHONE ENCOUNTER
Jonas Pt and LMOM to let me know we need to r/s his appt since he is not available  First hour slot is in April please offer to Pt  Jean Claudedorita Briseno  He will be a new patient with neurology so unfortunately his new appointment time needs to be a full hour            Previous Messages     ----- Message -----   From: Tom Barroso   Sent: 1/16/2023  12:14 PM EST   To: AYLA Lepe, Javier Cerrato   Subject: Pt appt                                           Good afternoon Raul slater called in to see if we can move his appt to a different time this week as he has a work commitment on Friday  Is there any way we are able to doa 45 min consultation for this pt so I can move him to an open time slot? Please let me know     Jessy Mroeland

## 2023-01-17 NOTE — TELEPHONE ENCOUNTER
ADD ON ok Per Darylene Balder  Appt made for tomorrow 1/18/23 @ 1:00 in [place]  Pt accepted  AYLA Torres  Yes that is ok with me for this consult   -bryon           Previous Messages     ----- Message -----   From: Faustino Arizmendi   Sent: 1/17/2023  11:43 AM EST   To: AYLA Torres   Subject: RE: Pt appt                                       I can offer that but its only a 45 min slot with Admin time following  Is it ok to use 15 mins of the admin time?   ----- Message -----   From: AYLA Torres   Sent: 1/17/2023  11:24 AM EST   To: Faustino Arizmendi   Subject: RE: Pt appt                                       Sure, what about tomorrow at 1pm   -bryon   ----- Message -----   From: Faustino Arizmendi   Sent: 1/16/2023  12:14 PM EST   To: AYLA Torres, [place]   Subject: Pt appt                                           Good afternoon Raul slater called in to see if we can move his appt to a different time this week as he has a work commitment on Friday  Is there any way we are able to doa 45 min consultation for this pt so I can move him to an open time slot? Please let me know     Geovani Longoria

## 2023-01-18 ENCOUNTER — PATIENT OUTREACH (OUTPATIENT)
Dept: FAMILY MEDICINE CLINIC | Facility: CLINIC | Age: 68
End: 2023-01-18

## 2023-01-18 ENCOUNTER — CONSULT (OUTPATIENT)
Dept: NEUROLOGY | Facility: CLINIC | Age: 68
End: 2023-01-18

## 2023-01-18 VITALS
WEIGHT: 132 LBS | HEART RATE: 80 BPM | BODY MASS INDEX: 25.91 KG/M2 | SYSTOLIC BLOOD PRESSURE: 153 MMHG | HEIGHT: 60 IN | TEMPERATURE: 97.6 F | DIASTOLIC BLOOD PRESSURE: 84 MMHG | RESPIRATION RATE: 20 BRPM

## 2023-01-18 DIAGNOSIS — R25.1 TREMOR: ICD-10-CM

## 2023-01-18 DIAGNOSIS — M79.2 CHRONIC NEUROPATHIC PAIN: ICD-10-CM

## 2023-01-18 DIAGNOSIS — F11.11 HISTORY OF HEROIN ABUSE (HCC): ICD-10-CM

## 2023-01-18 DIAGNOSIS — E11.49 OTHER DIABETIC NEUROLOGICAL COMPLICATION ASSOCIATED WITH TYPE 2 DIABETES MELLITUS (HCC): ICD-10-CM

## 2023-01-18 DIAGNOSIS — G89.29 CHRONIC NEUROPATHIC PAIN: ICD-10-CM

## 2023-01-18 DIAGNOSIS — M25.50 ARTHRALGIA: ICD-10-CM

## 2023-01-18 DIAGNOSIS — E11.9 DIABETES (HCC): Primary | ICD-10-CM

## 2023-01-18 RX ORDER — GABAPENTIN 100 MG/1
100 CAPSULE ORAL 3 TIMES DAILY
Qty: 90 CAPSULE | Refills: 1 | Status: SHIPPED | OUTPATIENT
Start: 2023-01-18

## 2023-01-18 NOTE — PROGRESS NOTES
Patient ID: Karyn Wisdom is a 78 y o  male  Assessment/Plan:  Patient Instructions:  Continue 900mg three times a day gabapentin; we could consider cymbalta if needed in future  Continue with efforts to quit smoking  Could trial over the counter lidocaine topically if continued pain in certain areas in feet  Complete emg and labs for further evaluation/ exclude other reasons for the neuropathy  Make sure you continue follow up with primary care and endocrinology for good blood sugar control  Make sure you have regular follow up with podiatry and ophthalmology  Congratulations on being almost 2 years drug free, continue with meetings  Follow up after emg in 8 months time  Let me know sooner if any worsening in symptoms  Exam without any focal findings; there is sensory loss in a stocking distribution consistent with a diagnosis of diabetic neuropathy  Will complete EMG to help support/clarify the diagnosis and will complete labs to rule out other cause  We discussed the importance of diabetes management with goal a1c <7  Diagnoses and all orders for this visit:    Diabetes [facility])  -     Ambulatory Referral to Podiatry; Future    Chronic neuropathic pain  -     Ambulatory Referral to Neurology  -     EMG 1 Upper/1 Lower Neuropathy; Future  -     Protein electrophoresis, serum; Future  -     RF Screen w/ Reflex to Titer; Future    History of heroin abuse (HCC)    Arthralgia  -     RF Screen w/ Reflex to Titer; Future    Tremor  -     Ceruloplasmin; Future         Subjective:    HPI   Karyn Wisdom is a right handed 78year old male who presents today as a new patient for neuropathy  He has a previous history of heroin abuse (nasal) but has been clean for the last 2 years; he continues with NA meetings;  he states his neuropathic pain started after he stopped using drugs (may have been going on before but didn't notice the pain)   He states he has a bilateral hand tremor, worse on left, that has improved slowly over time; he notices it when he writes or tries to [address]  He states his pain usually starts in his big toe on either foot; he also will have an occasional stabbing like pain in his left shoulder blade  He was using voltaren for muscle/joint pain but doesn't use this much anymore  He takes his gabapentin 900mg at 4am, 2pm, and 10pm; he states he is able to sleep from 10-4  He has a longstanding history of uncontrolled DM; he has family history of DM in a lot of his family members  He sees endocrinololgy/dietician  He is currently on trulicity and metformin  His most recent A1c is 8 5, down from 11 8  He has a freestyle reyna monitor but states it only works half the month; he also has limited options for food shopping as he does not drive (daughter does one big food shopping trip 1x/month)-these are factors that limit his ability to control his DM  He states it has been 1 year since his last eye exam, but he does have an eye doctor; he does have blurred vision  He states it has been a long time since he saw a podiatrist  He does continue to smoke 5 cigarettes/day  Recent labs reviewed- b12 1194, , vit d 21, na 133, k 3 8, creat 1 00, ast 16, alt 28, platelets 717, hgb 20 7, mcv 86  The following portions of the patient's history were reviewed and updated as appropriate: allergies, current medications, past family history, past medical history, past social history, past surgical history and problem list          Objective:    Blood pressure 153/84, pulse 80, temperature 97 6 °F (36 4 °C), temperature source Temporal, resp  rate 20, height 5' (1 524 m), weight 59 9 kg (132 lb)  Physical Exam  Vitals reviewed  Constitutional:       General: He is not in acute distress  Appearance: He is normal weight  He is not ill-appearing  HENT:      Head: Normocephalic and atraumatic        Right Ear: External ear normal       Left Ear: External ear normal       Mouth/Throat:      Mouth: Mucous membranes are moist       Pharynx: Oropharynx is clear  Eyes:      General: Lids are normal          Right eye: No discharge  Left eye: No discharge  Extraocular Movements: Extraocular movements intact  Conjunctiva/sclera: Conjunctivae normal       Pupils: Pupils are equal, round, and reactive to light  Cardiovascular:      Rate and Rhythm: Normal rate and regular rhythm  Pulses: Normal pulses  Dorsalis pedis pulses are 2+ on the right side and 2+ on the left side  Heart sounds: Normal heart sounds  Pulmonary:      Effort: Pulmonary effort is normal       Breath sounds: Normal breath sounds  Abdominal:      General: Bowel sounds are normal  There is no distension  Musculoskeletal:      Cervical back: Normal range of motion  No tenderness  Right lower leg: No edema  Left lower leg: No edema  Right foot: Normal range of motion  Bunion present  Left foot: Normal range of motion  Comments: No muscular tenderness  Joint tenderness on hands   Feet:      Right foot:      Skin integrity: Skin integrity normal       Left foot:      Skin integrity: Skin integrity normal       Comments: Flat arches  Skin:     General: Skin is warm and dry  Findings: No rash  Neurological:      Mental Status: He is alert  Motor: Motor strength is normal       Coordination: Romberg sign negative  Deep Tendon Reflexes:      Reflex Scores:       Brachioradialis reflexes are 1+ on the right side and 1+ on the left side  Patellar reflexes are 1+ on the right side and 1+ on the left side  Psychiatric:         Attention and Perception: Attention normal          Mood and Affect: Mood normal          Speech: Speech normal          Behavior: Behavior is cooperative  Neurological Exam  Mental Status  Alert  Oriented to person, place and time  Speech is normal  Language is fluent with no aphasia      Cranial Nerves  CN II: Visual acuity is normal  Visual fields full to confrontation  CN III, IV, VI: Extraocular movements intact bilaterally  Normal lids and orbits bilaterally  Pupils equal round and reactive to light bilaterally  CN V: Facial sensation is normal   CN VII: Full and symmetric facial movement  CN VIII: Hearing is normal   CN IX, X: Palate elevates symmetrically  Normal gag reflex  CN XI: Shoulder shrug strength is normal   CN XII: Tongue midline without atrophy or fasciculations  Motor  Normal muscle bulk throughout  Strength is 5/5 throughout all four extremities  Sensory  Light touch abnormality: Vibration abnormality:   Decreased below ankle, vibration 7 seconds at toes 10 seconds at ankles/bilaterally  Reflexes                                            Right                      Left  Brachioradialis                    1+                         1+  Patellar                                1+                         1+  Achilles                                Tr                         Tr    Coordination  Right: Finger-to-nose normal Left: Tremor noted more on left; able to perform FNF testing  Gait  Casual gait is normal including stance, stride, and arm swing  Romberg is absent  Able to rise from chair without using arms  No assistive device  ROS:    Review of Systems   Constitutional: Negative  Negative for appetite change and fever  HENT: Negative  Negative for hearing loss, tinnitus, trouble swallowing and voice change  Eyes: Negative  Negative for photophobia, pain and visual disturbance  Respiratory: Negative  Negative for shortness of breath  Cardiovascular: Negative  Negative for palpitations  Gastrointestinal: Negative  Negative for nausea and vomiting  Endocrine: Negative  Negative for cold intolerance  Genitourinary: Negative  Negative for dysuria, frequency and urgency  Musculoskeletal: Positive for myalgias  Negative for gait problem and neck pain  General body pain     Skin: Negative  Negative for rash  Allergic/Immunologic: Negative  Neurological: Positive for numbness  Negative for dizziness, tremors, seizures, syncope, facial asymmetry, speech difficulty, weakness, light-headedness and headaches  Hematological: Negative  Does not bruise/bleed easily  Psychiatric/Behavioral: Negative  Negative for confusion, hallucinations and sleep disturbance     ROS was reviewed and updated as appropriate

## 2023-01-18 NOTE — PROGRESS NOTES
I received a voicemail from the patient stating he needs gabapentin 100mg  I called the pharmacy and reviewed several of the patient's meds  The patient needs a new order for 100mg gabapentin as the last order was for only 30d; request sent to PCP

## 2023-01-18 NOTE — PATIENT INSTRUCTIONS
Continue 900mg three times a day gabapentin; we could consider cymbalta if needed in future  Continue with efforts to quit smoking  Could trial over the counter lidocaine topically if continued pain in certain areas in feet  Complete emg and labs for further evaluation/ exclude other reasons for the neuropathy  Make sure you continue follow up with primary care and endocrinology for good blood sugar control  Make sure you have regular follow up with podiatry and ophthalmology  Congratulations on being almost 2 years drug free, continue with meetings  Follow up after emg in 8 months time  Let me know sooner if any worsening in symptoms  Diabetic Neuropathy   WHAT YOU NEED TO KNOW:   Diabetic neuropathy (DN) is a type of nerve damage that can develop if you have diabetes  High blood sugar that is not controlled can damage nerves and slow or stop their ability to send signals  DN is most common in the legs and feet  DISCHARGE INSTRUCTIONS:   Return to the emergency department if:   Your legs or feet start to turn blue or black  You have a wound that does not heal or is red, swollen, or draining fluid  Call your care team provider if:   You begin to have symptoms  Your blood sugar level is higher or lower than care team providers have told you it should be  You have redness, calluses, or sores on your feet  You have questions or concerns about your condition or care  Treatment for DN:  DN cannot be cured  The goal of treatment is to decrease pain, slow progression of DN, and prevent complications  Keep your blood sugar levels as close to your target levels as possible  Check your blood sugar levels often, as directed  Contact your healthcare provider if your levels are higher than they should be  Work with your dietitian to create a healthy meal plan  This meal plan can help you control your blood sugar and decrease your symptoms      Be physically active at least 30 minutes, 5 days a week   This can help keep your blood sugar level steady and help you manage your weight  Ask your care team provider about the best activity plan for you  Use caution when you exercise if you have decreased feeling in your feet  Maintain a healthy weight  Ask your care team provider what a healthy weight is for you  A healthy weight can help you manage your blood sugar level  Medicines  may be given to help decrease nerve pain  Manage your symptoms:   Care for your feet  Check your feet each day for cuts, scratches, calluses, or other wounds  Look for redness and swelling, and feel for warmth  Wear shoes that fit well  Check your shoes for rocks or other objects that can hurt your feet  Do not walk barefoot or wear shoes without socks  Wear cotton socks to help keep your feet dry  Do not smoke  Nicotine can worsen your symptoms and make it more difficult to manage your diabetes  Ask your healthcare provider for information if you currently smoke and need help to quit  E-cigarettes or smokeless tobacco still contain nicotine  Talk to your healthcare provider before you use these products  Limit alcohol as directed  Alcohol can cause your blood sugar levels to be low if you use insulin  Alcohol can cause high blood sugar levels and weight gain if you drink too much  A drink of alcohol is 12 ounces of beer, 5 ounces of wine, or 1½ ounces of liquor  Your healthcare provider can tell you how many drinks are okay to have within 24 hours and within 1 week  Follow up with your care team provider as directed: You will need to have your feet checked at least 1 time each year  Write down your questions so you remember to ask them during your visits  © Copyright [employer] 2022 Information is for End User's use only and may not be sold, redistributed or otherwise used for commercial purposes   All illustrations and images included in CareNotes® are the copyrighted property of [employer]  or [address]  The above information is an  only  It is not intended as medical advice for individual conditions or treatments  Talk to your doctor, nurse or pharmacist before following any medical regimen to see if it is safe and effective for you

## 2023-01-19 ENCOUNTER — PATIENT OUTREACH (OUTPATIENT)
Dept: FAMILY MEDICINE CLINIC | Facility: CLINIC | Age: 68
End: 2023-01-19

## 2023-01-19 NOTE — PROGRESS NOTES
I received a VM message from the patient asking about the gabapentin order  I called the patient but received voicemail  Message was left informing him PCP ordered the med yesterday with a confirmation the pharmacy received it

## 2023-01-20 ENCOUNTER — PATIENT OUTREACH (OUTPATIENT)
Dept: FAMILY MEDICINE CLINIC | Facility: CLINIC | Age: 68
End: 2023-01-20

## 2023-01-20 NOTE — PROGRESS NOTES
I received a call from the patient stating his second sensor was damaged  He called [facility] but the issue was not resolved  I asked that the patient continue checking his blood sugars with his glucometer  He stated he did not have test strips but then found them during the call  I informed the patient the gabapentin 100mg was ordered a few days ago and to check with the pharmacy for   The patient stated he is going away for 3 days to an "[employer] in [place]  I will follow up next week

## 2023-01-23 ENCOUNTER — PATIENT OUTREACH (OUTPATIENT)
Dept: FAMILY MEDICINE CLINIC | Facility: CLINIC | Age: 68
End: 2023-01-23

## 2023-01-23 NOTE — PROGRESS NOTES
01/23/23    first attempt to contact patient  no answer    Left detailed message      If pt contacts office, please assist pt with scheduling a f/u appt      Appt: f/u DM (IRIS Exam)

## 2023-01-23 NOTE — PROGRESS NOTES
I called the patient to remind him of his GI appointment for tomorrow at 130  I will continue to follow

## 2023-01-24 ENCOUNTER — OFFICE VISIT (OUTPATIENT)
Dept: GASTROENTEROLOGY | Facility: MEDICAL CENTER | Age: 68
End: 2023-01-24

## 2023-01-24 VITALS
DIASTOLIC BLOOD PRESSURE: 76 MMHG | WEIGHT: 131.6 LBS | HEART RATE: 86 BPM | SYSTOLIC BLOOD PRESSURE: 126 MMHG | TEMPERATURE: 97.4 F | BODY MASS INDEX: 25.7 KG/M2

## 2023-01-24 DIAGNOSIS — Z12.11 COLON CANCER SCREENING: Primary | ICD-10-CM

## 2023-01-24 DIAGNOSIS — R10.84 GENERALIZED ABDOMINAL PAIN: ICD-10-CM

## 2023-01-24 DIAGNOSIS — R14.0 BLOATING: ICD-10-CM

## 2023-01-24 DIAGNOSIS — A04.8 H. PYLORI INFECTION: ICD-10-CM

## 2023-01-24 NOTE — PROGRESS NOTES
[facility] - Outpatient Follow-up Note  Keira Gayle 78 y o  male MRN: [id number]  Encounter: [id number]          ASSESSMENT AND PLAN:      1  Colon cancer screening: last colonoscopy 20-30 years ago  Overdue for screening  Agreeable to schedule  No alarm symptoms  - Colonoscopy; Future  - bisacodyl (DULCOLAX) 5 mg EC tablet; Take as instructed by GI office for bowel prep for colonoscopy  Dispense: 2 tablet; Refill: 0  - polyethylene glycol (GOLYTELY) 4000 mL solution; Take as instructed by GI office for bowel prep  Dispense: 4000 mL; Refill: 0    2  H  pylori infection  3  Bloating  4  Generalized abdominal pain: EGD 8/22 for epigastric pain and bloating  Appeared normal but biopsies showed h pylori  It is unclear if he took abx therapy  I do see a PPI and he mentions a 2 weeks course  Will check h pylori stool antigen and if positive, will retreat his infection  He does still have some bloating and pain but admits to some improvement   -f/u h pylori stool antigen     Risks of colonoscopy were discussed including but not limited to bleeding, infection, perforation  He understands and agrees to proceed with procedure        ______________________________________________________________________    SUBJECTIVE:  Keira Gayle is a 30-year-old male with past medical history of diabetes mellitus, hyperlipidemia who is here for follow-up after EGD  He originally underwent EGD for epigastric pain and bloating  This was done 8/11/2022  Everything appeared normal however, biopsies did show H  pylori infection  He was planned have a colonoscopy at the same time colon cancer screening purposes however, he did not prep and this procedure was not attempted  It is unclear if he completed antibiotic therapy for his H  pylori infection  I am only able to see that a PPI was sent  He does admit to taking medication for 2-week course  He has not completed his stool antigen    He does admit to some improvement in his epigastric pain and bloating but states that it is still there  Colonoscopy was 20 to 30 years ago  No family history of colorectal cancers      REVIEW OF SYSTEMS IS OTHERWISE NEGATIVE  Historical Information   Past Medical History:   Diagnosis Date   • Diabetes mellitus ([address] )    • Hyperlipidemia      History reviewed  No pertinent surgical history  Social History   Social History     Substance and Sexual Activity   Alcohol Use Not Currently     Social History     Substance and Sexual Activity   Drug Use Never     Social History     Tobacco Use   Smoking Status Every Day   • Packs/day: 0 25   • Types: Cigarettes   Smokeless Tobacco Never     History reviewed  No pertinent family history      Meds/Allergies       Current Outpatient Medications:   •  Alcohol Swabs (Alcohol Pads) 70 % PADS  •  aspirin (Aspirin Low Dose) 81 mg EC tablet  •  atorvastatin (LIPITOR) 10 mg tablet  •  bisacodyl (DULCOLAX) 5 mg EC tablet  •  cholecalciferol (VITAMIN D3) 1,000 units tablet  •  Continuous Blood Gluc  (FreeStyle Angella 14 Day Ayden) AYLA  •  Continuous Blood Gluc Sensor (FreeStyle Angella 14 Day Sensor) MISC  •  Dulaglutide 0 75 MG/0 5ML SOPN  •  gabapentin (Neurontin) 100 mg capsule  •  gabapentin (NEURONTIN) 800 mg tablet  •  glucose monitoring kit (FREESTYLE) monitoring kit  •  Lancets (freestyle) lancets  •  metFORMIN (GLUCOPHAGE) 1000 MG tablet  •  polyethylene glycol (GOLYTELY) 4000 mL solution  •  traZODone (DESYREL) 50 mg tablet  •  Diclofenac Sodium (VOLTAREN) 1 %  •  glucose blood (FREESTYLE LITE) test strip  •  insulin detemir (Levemir FlexTouch) 100 Units/mL injection pen  •  Insulin Pen Needle 32G X 4 MM MISC  •  pantoprazole (PROTONIX) 40 mg tablet  •  pantoprazole (PROTONIX) 40 mg tablet  •  saccharomyces boulardii (FLORASTOR) 250 mg capsule  •  sildenafil (VIAGRA) 25 MG tablet  •  sucralfate (CARAFATE) 1 g tablet    No Known Allergies        Objective     Blood pressure 126/76, pulse 86, temperature (!) 97 4 °F (36 3 °C), weight 59 7 kg (131 lb 9 6 oz)  Body mass index is 25 7 kg/m²  PHYSICAL EXAM:      General Appearance:   Alert, cooperative, no distress   HEENT:   Normocephalic, atraumatic, anicteric      Neck:  Supple, symmetrical, trachea midline   Lungs:   Clear to auscultation bilaterally; no rales, rhonchi or wheezing; respirations unlabored    Heart[de-identified]   Regular rate and rhythm; no murmur, rub, or gallop  Abdomen:   Soft, non-tender, non-distended; normal bowel sounds; no masses, no organomegaly    Genitalia:   Deferred    Rectal:   Deferred    Extremities:  No cyanosis, clubbing or edema        Skin:  No jaundice, rashes, or lesions          Lab Results:   No visits with results within 1 Day(s) from this visit     Latest known visit with results is:   Appointment on 09/20/2022   Component Date Value   • WBC 09/20/2022 8 45    • RBC 09/20/2022 5 33    • Hemoglobin 09/20/2022 14 9    • Hematocrit 09/20/2022 45 6    • MCV 09/20/2022 86    • MCH 09/20/2022 28 0    • MCHC 09/20/2022 32 7    • RDW 09/20/2022 13 2    • MPV 09/20/2022 12 7    • Platelets 58/07/6935 152    • nRBC 09/20/2022 0    • Neutrophils Relative 09/20/2022 63    • Immat GRANS % 09/20/2022 0    • Lymphocytes Relative 09/20/2022 24    • Monocytes Relative 09/20/2022 8    • Eosinophils Relative 09/20/2022 4    • Basophils Relative 09/20/2022 1    • Neutrophils Absolute 09/20/2022 5 25    • Immature Grans Absolute 09/20/2022 0 03    • Lymphocytes Absolute 09/20/2022 2 05    • Monocytes Absolute 09/20/2022 0 71    • Eosinophils Absolute 09/20/2022 0 36    • Basophils Absolute 09/20/2022 0 05    • Sodium 09/20/2022 133 (L)    • Potassium 09/20/2022 3 8    • Chloride 09/20/2022 101    • CO2 09/20/2022 26    • ANION GAP 09/20/2022 6    • BUN 09/20/2022 13    • Creatinine 09/20/2022 1 00    • Glucose, Fasting 09/20/2022 289 (H)    • Calcium 09/20/2022 9 6    • AST 09/20/2022 16    • ALT 09/20/2022 28    • Alkaline Phosphatase 09/20/2022 76    • Total Protein 09/20/2022 7 7    • Albumin 09/20/2022 4 0    • Total Bilirubin 09/20/2022 0 59    • eGFR 09/20/2022 78    • Cholesterol 09/20/2022 231 (H)    • Triglycerides 09/20/2022 191 (H)    • HDL, Direct 09/20/2022 59    • LDL Calculated 09/20/2022 134 (H)    • TSH 3RD GENERATON 09/20/2022 0 963    • Vitamin B-12 09/20/2022 1,194 (H)    • Prostate Specific Antige* 09/20/2022 1 3    • PSA, Free 09/20/2022 0 40    • PSA, Free Pct 09/20/2022 30 8    • Vit D, 25-Hydroxy 09/20/2022 21 6 (L)          Radiology Results:   No results found

## 2023-01-26 ENCOUNTER — OFFICE VISIT (OUTPATIENT)
Dept: FAMILY MEDICINE CLINIC | Facility: CLINIC | Age: 68
End: 2023-01-26

## 2023-01-26 ENCOUNTER — PATIENT OUTREACH (OUTPATIENT)
Dept: FAMILY MEDICINE CLINIC | Facility: CLINIC | Age: 68
End: 2023-01-26

## 2023-01-26 DIAGNOSIS — U07.1 COVID-19 VIRUS INFECTION: Primary | ICD-10-CM

## 2023-01-26 RX ORDER — NIRMATRELVIR AND RITONAVIR 300-100 MG
3 KIT ORAL 2 TIMES DAILY
Qty: 30 TABLET | Refills: 0 | Status: SHIPPED | OUTPATIENT
Start: 2023-01-26 | End: 2023-01-31

## 2023-01-26 NOTE — PROGRESS NOTES
I received a call from the patient stating he believes he has Covid  I sent a note to clerical to see if the patient can be scheduled for a virtual appointment today; appointment scheduled for 140pm   I will continue to follow

## 2023-01-26 NOTE — ASSESSMENT & PLAN NOTE
-A few day onset of viral symptoms  -Home COVID test positive  -Recommend 5 days of isolation precautions and strict mask wearing  -Will provide antiviral COVID medication(risk/benefit/and adverse effect provided to patient)  -Recent labs reviewed  -Recommend holding statin while on antiviral therapy

## 2023-01-26 NOTE — PROGRESS NOTES
COVID-19 Outpatient Progress Note    Assessment/Plan:    Problem List Items Addressed This Visit        Other    COVID-19 virus infection - Primary     -A few day onset of viral symptoms  -Home COVID test positive  -Recommend 5 days of isolation precautions and strict mask wearing  -Will provide antiviral COVID medication(risk/benefit/and adverse effect provided to patient)  -Recent labs reviewed  -Recommend holding statin while on antiviral therapy           Relevant Medications    nirmatrelvir & ritonavir (Paxlovid, 300/100,) tablet therapy pack      Disposition:     Risks and benefits of COVID-19 vaccination was discussed with patient  Recommend 5 days of isolation precautions from onset of symptoms  Can discontinue isolation once afebrile for more than 24 hours and symptoms are improving  Patient meets criteria for PAXLOVID and they have been counseled appropriately according to EUA documentation released by the FDA  After discussion, patient agrees to treatment  Chauncey Daisy is an investigational medicine used to treat mild-to-moderate COVID-19 in adults and children (15years of age and older weighing at least 80 pounds (40 kg)) with positive results of direct SARS-CoV-2 viral testing, and who are at high risk for progression to severe COVID-19, including hospitalization or death  PAXLOVID is investigational because it is still being studied  There is limited information about the safety and effectiveness of using PAXLOVID to treat people with mild-to-moderate COVID-19  The FDA has authorized the emergency use of PAXLOVID for the treatment of mild-tomoderate COVID-19 in adults and children (15years of age and older weighing at least 80 pounds (40 kg)) with a positive test for the virus that causes COVID-19, and who are at high risk for progression to severe COVID-19, including hospitalization or death, under an EUA  What should I tell my healthcare provider before I take PAXLOVID?     Tell your healthcare provider if you:  - Have any allergies  - Have liver or kidney disease  - Are pregnant or plan to become pregnant  - Are breastfeeding a child  - Have any serious illnesses    Tell your healthcare provider about all the medicines you take, including prescription and over-the-counter medicines, vitamins, and herbal supplements  Some medicines may interact with PAXLOVID and may cause serious side effects  Keep a list of your medicines to show your healthcare provider and pharmacist when you get a new medicine  You can ask your healthcare provider or pharmacist for a list of medicines that interact with PAXLOVID  Do not start taking a new medicine without telling your healthcare provider  Your healthcare provider can tell you if it is safe to take PAXLOVID with other medicines  Tell your healthcare provider if you are taking combined hormonal contraceptive  PAXLOVID may affect how your birth control pills work  Females who are able to become pregnant should use another effective alternative form of contraception or an additional barrier method of contraception  Talk to your healthcare provider if you have any questions about contraceptive methods that might be right for you  How do I take PAXLOVID? PAXLOVID consists of 2 medicines: nirmatrelvir and ritonavir  - Take 2 pink tablets of nirmatrelvir with 1 white tablet of ritonavir by mouth 2 times each day (in the morning and in the evening) for 5 days  For each dose, take all 3 tablets at the same time  - If you have kidney disease, talk to your healthcare provider  You may need a different dose  - Swallow the tablets whole  Do not chew, break, or crush the tablets  - Take PAXLOVID with or without food  - Do not stop taking PAXLOVID without talking to your healthcare provider, even if you feel better  - If you miss a dose of PAXLOVID within 8 hours of the time it is usually taken, take it as soon as you remember   If you miss a dose by more than 8 hours, skip the missed dose and take the next dose at your regular time  Do not take 2 doses of PAXLOVID at the same time  - If you take too much PAXLOVID, call your healthcare provider or go to the nearest hospital emergency room right away  - If you are taking a ritonavir- or cobicistat-containing medicine to treat hepatitis C or Human Immunodeficiency Virus (HIV), you should continue to take your medicine as prescribed by your healthcare provider   - Talk to your healthcare provider if you do not feel better or if you feel worse after 5 days  Who should generally not take PAXLOVID? Do not take PAXLOVID if:  You are allergic to nirmatrelvir, ritonavir, or any of the ingredients in PAXLOVID  You are taking any of the following medicines:  - Alfuzosin  - Pethidine, piroxicam, propoxyphene  - Ranolazine  - Amiodarone, dronedarone, flecainide, propafenone, quinidine  - Colchicine  - Lurasidone, pimozide, clozapine  - Dihydroergotamine, ergotamine, methylergonovine  - Lovastatin, simvastatin  - Sildenafil (Revatio®) for pulmonary arterial hypertension (PAH)  - Triazolam, oral midazolam  - Apalutamide  - Carbamazepine, phenobarbital, phenytoin  - Rifampin  - St  Chadwick’s Wort (hypericum perforatum)    What are the important possible side effects of PAXLOVID? Possible side effects of PAXLOVID are:  - Liver Problems  Tell your healthcare provider right away if you have any of these signs and symptoms of liver problems: loss of appetite, yellowing of your skin and the whites of eyes (jaundice), dark-colored urine, pale colored stools and itchy skin, stomach area (abdominal) pain  - Resistance to HIV Medicines  If you have untreated HIV infection, PAXLOVID may lead to some HIV medicines not working as well in the future  - Other possible side effects include: altered sense of taste, diarrhea, high blood pressure, or muscle aches    These are not all the possible side effects of PAXLOVID   Not many people have taken PAXLOVID  Serious and unexpected side effects may happen  Dedra Never is still being studied, so it is possible that all of the risks are not known at this time  What other treatment choices are there? Like Asad Mehta may allow for the emergency use of other medicines to treat people with COVID-19  Go to https://Ustream/ for information on the emergency use of other medicines that are authorized by FDA to treat people with COVID-19  Your healthcare provider may talk with you about clinical trials for which you may be eligible  It is your choice to be treated or not to be treated with PAXLOVID  Should you decide not to receive it or for your child not to receive it, it will not change your standard medical care  What if I am pregnant or breastfeeding? There is no experience treating pregnant women or breastfeeding mothers with PAXLOVID  For a mother and unborn baby, the benefit of taking PAXLOVID may be greater than the risk from the treatment  If you are pregnant, discuss your options and specific situation with your healthcare provider  It is recommended that you use effective barrier contraception or do not have sexual activity while taking PAXLOVID  If you are breastfeeding, discuss your options and specific situation with your healthcare provider  How do I report side effects with PAXLOVID? Contact your healthcare provider if you have any side effects that bother you or do not go away  Report side effects to FDA MedWatch at www fda gov/medwatch or call 5-137-ITW0067 or you can report side effects to [facility]  at the contact information provided below  Website Fax number Telephone number   [employer] [phone]     How should I store Dedra Never?     Store PAXLOVID tablets at room temperature between 68°F to 77°F (20°C to 25°C)     Full fact sheet for patients, parents, and caregivers can be found at: Nico ellsworth    I have spent 20 minutes directly with the patient  Greater than 50% of this time was spent in counseling/coordination of care regarding: risks and benefits of treatment options, instructions for management, patient and family education and impressions  Encounter provider: [address] [place] [address]     Provider located at: [address] [address] [phone]     Recent Visits  No visits were found meeting these conditions  Showing recent visits within past 7 days and meeting all other requirements  Today's Visits  Date Type Provider Dept   01/26/23 Office Visit [facility]   Showing today's visits and meeting all other requirements  Future Appointments  No visits were found meeting these conditions  Showing future appointments within next 150 days and meeting all other requirements     This virtual check-in was done via telephone and he agrees to proceed  Patient agrees to participate in a virtual check in via telephone or video visit instead of presenting to the office to address urgent/immediate medical needs  Patient is aware this is a billable service  He acknowledged consent and understanding of privacy and security of the video platform  The patient has agreed to participate and understands they can discontinue the visit at any time  After connecting through Telephone, the patient was identified by name and date of birth  Victorino Pablo was informed that this was a telemedicine visit and that the exam was being conducted confidentially over secure lines  My office door was closed  No one else was in the room  Victorino Pablo acknowledged consent and understanding of privacy and security of the telemedicine visit   I informed the patient that I have reviewed his record in [address] [place] [address] and presented the opportunity for him to ask any questions regarding the visit today  The patient agreed to participate  It was my intent to perform this visit via video technology but the patient was not able to do a video connection so the visit was completed via audio telephone only  Verification of patient location:  Patient is located in the following state in which I hold an active license: [place]    Subjective:   Dulce Le is a 78 y o  male who is concerned about COVID-19  Patient's symptoms include fatigue, nasal congestion and sore throat  Patient denies fever, chills, cough, shortness of breath, chest tightness, abdominal pain, nausea and vomiting      - Date of symptom onset: 1/23/2023  - Date of exposure: 1/26/2023      COVID-19 vaccination status: Fully vaccinated (primary series)    Exposure:   Contact with a person who is under investigation (PUI) for or who is positive for COVID-19 within the last 14 days?: No    Hospitalized recently for fever and/or lower respiratory symptoms?: No      Currently a healthcare worker that is involved in direct patient care?: No      Works in a special setting where the risk of COVID-19 transmission may be high? (this may include long-term care, correctional and FDC facilities; homeless shelters; assisted-living facilities and group homes ): No      Resident in a special setting where the risk of COVID-19 transmission may be high? (this may include long-term care, correctional and FDC facilities; homeless shelters; assisted-living facilities and group homes ): No      No results found for: [address] [facility] 98, 185 [facility], [address] [facility] 116, 350 [facility], 700 [facility]    Review of Systems   Constitutional: Positive for fatigue  Negative for chills and fever  HENT: Positive for congestion and sore throat  Respiratory: Negative for cough, chest tightness and shortness of breath      Gastrointestinal: Negative for abdominal pain, nausea and vomiting       Current Outpatient Medications on File Prior to Visit   Medication Sig   • Alcohol Swabs (Alcohol Pads) 70 % PADS Use daily in the early morning   • aspirin (Aspirin Low Dose) 81 mg EC tablet Take 1 tablet (81 mg total) by mouth daily   • atorvastatin (LIPITOR) 10 mg tablet Take 1 tablet (10 mg total) by mouth daily   • bisacodyl (DULCOLAX) 5 mg EC tablet Take as instructed by GI office for bowel prep for colonoscopy   • cholecalciferol (VITAMIN D3) 1,000 units tablet Take 1 tablet (1,000 Units total) by mouth daily   • Continuous Blood Gluc  (FreeStyle Angella 14 Day [place]) AYLA Use 1 Device continuous   • Continuous Blood Gluc Sensor (FreeStyle Angella 14 Day Sensor) MISC Use 1 Device continuous   • Diclofenac Sodium (VOLTAREN) 1 % Apply 2 g topically 4 (four) times a day (Patient not taking: Reported on 1/18/2023)   • Dulaglutide 0 75 MG/0 5ML SOPN Inject 0 5 mL (0 75 mg total) under the skin once a week   • gabapentin (Neurontin) 100 mg capsule Take 1 capsule (100 mg total) by mouth 3 (three) times a day   • gabapentin (NEURONTIN) 800 mg tablet Take 1 tablet (800 mg total) by mouth 3 (three) times a day   • glucose blood (FREESTYLE LITE) test strip Check blood sugar 3 times a day   • glucose monitoring kit (FREESTYLE) monitoring kit Use 1 each daily in the early morning   • insulin detemir (Levemir FlexTouch) 100 Units/mL injection pen Inject 15 Units under the skin daily at bedtime   • Insulin Pen Needle 32G X 4 MM MISC Use daily at bedtime   • Lancets (freestyle) lancets Use as instructed   • metFORMIN (GLUCOPHAGE) 1000 MG tablet Take 1 tablet (1,000 mg total) by mouth 2 (two) times a day with meals   • pantoprazole (PROTONIX) 40 mg tablet take 1 tablet by mouth twice a day (Patient not taking: Reported on 1/18/2023)   • pantoprazole (PROTONIX) 40 mg tablet take 1 tablet by mouth every 12 hours for 14 days (Patient not taking: Reported on 1/18/2023)   • polyethylene glycol (GOLYTELY) 4000 mL solution Take as instructed by GI office for bowel prep   • saccharomyces boulardii (FLORASTOR) 250 mg capsule Take 1 capsule (250 mg total) by mouth 2 (two) times a day (Patient not taking: Reported on 1/18/2023)   • sildenafil (VIAGRA) 25 MG tablet Take 1 tablet (25 mg total) by mouth daily as needed for erectile dysfunction (Patient not taking: Reported on 4/27/2022)   • sucralfate (CARAFATE) 1 g tablet take 1 tablet by mouth four times a day   • traZODone (DESYREL) 50 mg tablet Take 1 tablet (50 mg total) by mouth daily at bedtime       Objective: There were no vitals taken for this visit       Physical Exam  [address]

## 2023-02-09 ENCOUNTER — PATIENT OUTREACH (OUTPATIENT)
Dept: FAMILY MEDICINE CLINIC | Facility: CLINIC | Age: 68
End: 2023-02-09

## 2023-02-09 NOTE — PROGRESS NOTES
I called the patient to follow up  He states he is feeling well  He notes he quarantined for 6 days after being diagnosed with Covid a few weeks ago and feels fine now  The patient should be receiving his shipment of sensors in a few days  He has not been checking his blood sugars because he does not want to stick his finger  The patient stated he is taking his meds as prescribed and watching his diet  The patient was happy to announce he is "2 years clean"  I congratulated him on this accomplishment  The patient will call with any questions or concerns  I will continue to follow

## 2023-02-28 ENCOUNTER — PATIENT OUTREACH (OUTPATIENT)
Dept: FAMILY MEDICINE CLINIC | Facility: CLINIC | Age: 68
End: 2023-02-28

## 2023-02-28 DIAGNOSIS — E11.69 TYPE 2 DIABETES MELLITUS WITH OTHER SPECIFIED COMPLICATION, UNSPECIFIED WHETHER LONG TERM INSULIN USE (HCC): ICD-10-CM

## 2023-02-28 DIAGNOSIS — E11.49 OTHER DIABETIC NEUROLOGICAL COMPLICATION ASSOCIATED WITH TYPE 2 DIABETES MELLITUS (HCC): ICD-10-CM

## 2023-02-28 RX ORDER — DULAGLUTIDE 0.75 MG/.5ML
INJECTION, SOLUTION SUBCUTANEOUS
Qty: 6 ML | Refills: 0 | Status: SHIPPED | OUTPATIENT
Start: 2023-02-28

## 2023-02-28 NOTE — PROGRESS NOTES
I received a call from the patient asking for a refill of Trulicity  Chart reviewed; there is an order pending  I asked the patient to call me if he does receive his prescription  I will continue to follow

## 2023-03-10 ENCOUNTER — PATIENT OUTREACH (OUTPATIENT)
Dept: FAMILY MEDICINE CLINIC | Facility: CLINIC | Age: 68
End: 2023-03-10

## 2023-03-10 NOTE — PROGRESS NOTES
I received a message from the patient stating he needs to see Podiatry  Chart reviewed  Patient last saw Podiatry 12/21 (Dr Luis Alvares)  PCP placed a referral 8/3/22 to Dr Chapo Rojas  I called Dr Sophy Solomon office but received voicemail; message was left asking for a return call  I will follow up with Dr Sophy Solomon office if I do not receive a return call

## 2023-03-15 ENCOUNTER — PATIENT OUTREACH (OUTPATIENT)
Dept: FAMILY MEDICINE CLINIC | Facility: CLINIC | Age: 68
End: 2023-03-15

## 2023-03-15 NOTE — PROGRESS NOTES
I called the patient to remind him of his PCP appointment for tomorrow; he was unaware but plans on attending  I informed the patient I did not receive a response from Dr Sophy Solomon office  The patient saw  Podiatry in 12/21 and would like to return to them  [facility] stated [facility] have been trying to reach the patient without success  They suggested the patient call them directly; patient was informed and will call  I will continue to follow

## 2023-03-16 ENCOUNTER — PATIENT OUTREACH (OUTPATIENT)
Dept: FAMILY MEDICINE CLINIC | Facility: CLINIC | Age: 68
End: 2023-03-16

## 2023-03-16 NOTE — PROGRESS NOTES
I received a call from the patient stating he mixed up his appointment times and missed his PCP appointment this morning  The appointment was rescheduled for next week; I will call the patient with a reminder

## 2023-03-20 DIAGNOSIS — E11.49 OTHER DIABETIC NEUROLOGICAL COMPLICATION ASSOCIATED WITH TYPE 2 DIABETES MELLITUS (HCC): ICD-10-CM

## 2023-03-20 RX ORDER — GABAPENTIN 100 MG/1
CAPSULE ORAL
Qty: 90 CAPSULE | Refills: 0 | Status: SHIPPED | OUTPATIENT
Start: 2023-03-20

## 2023-03-23 ENCOUNTER — PATIENT OUTREACH (OUTPATIENT)
Dept: FAMILY MEDICINE CLINIC | Facility: CLINIC | Age: 68
End: 2023-03-23

## 2023-03-23 NOTE — PROGRESS NOTES
I called the patient but received voicemail  Message was left reminding him of his PCP appointment for tomorrow at 125  I will continue to follow  I received a message on my VM from the patient stating his appointment was tomorrow at 1140  I called the patient who did not answer  I sent him an email with the correct time for his appointment

## 2023-03-24 ENCOUNTER — OFFICE VISIT (OUTPATIENT)
Dept: FAMILY MEDICINE CLINIC | Facility: CLINIC | Age: 68
End: 2023-03-24

## 2023-03-24 VITALS
WEIGHT: 132 LBS | OXYGEN SATURATION: 97 % | HEART RATE: 91 BPM | TEMPERATURE: 97.8 F | DIASTOLIC BLOOD PRESSURE: 78 MMHG | SYSTOLIC BLOOD PRESSURE: 140 MMHG | RESPIRATION RATE: 19 BRPM | BODY MASS INDEX: 25.78 KG/M2

## 2023-03-24 DIAGNOSIS — E78.00 PURE HYPERCHOLESTEROLEMIA: ICD-10-CM

## 2023-03-24 DIAGNOSIS — E11.69 TYPE 2 DIABETES MELLITUS WITH OTHER SPECIFIED COMPLICATION, UNSPECIFIED WHETHER LONG TERM INSULIN USE (HCC): Primary | ICD-10-CM

## 2023-03-24 LAB — SL AMB POCT HEMOGLOBIN AIC: 8.2 (ref ?–6.5)

## 2023-03-24 NOTE — ASSESSMENT & PLAN NOTE
HgbA1C trending down  Continue metformin 4276CF BID and trulicity injection every 7 days  Encouraged patient to eat a low fat diet  Discussed the importance of cutting down on snacks and foods involving bread  Advised following with ophthalmology for blurry vision    Lab Results   Component Value Date    HGBA1C 8 2 (A) 03/24/2023

## 2023-03-24 NOTE — ASSESSMENT & PLAN NOTE
Encouraged eating low fat and low sugar diet, incorporating more vegetables and cutting down on high fat, fried, and high carb foods

## 2023-03-26 DIAGNOSIS — F41.9 ANXIETY: ICD-10-CM

## 2023-03-27 DIAGNOSIS — E11.49 OTHER DIABETIC NEUROLOGICAL COMPLICATION ASSOCIATED WITH TYPE 2 DIABETES MELLITUS (HCC): ICD-10-CM

## 2023-03-27 RX ORDER — TRAZODONE HYDROCHLORIDE 50 MG/1
TABLET ORAL
Qty: 30 TABLET | Refills: 5 | Status: SHIPPED | OUTPATIENT
Start: 2023-03-27

## 2023-03-27 RX ORDER — GABAPENTIN 100 MG/1
CAPSULE ORAL
Qty: 90 CAPSULE | Refills: 0 | OUTPATIENT
Start: 2023-03-27

## 2023-03-28 DIAGNOSIS — E11.69 TYPE 2 DIABETES MELLITUS WITH OTHER SPECIFIED COMPLICATION, UNSPECIFIED WHETHER LONG TERM INSULIN USE (HCC): ICD-10-CM

## 2023-03-28 RX ORDER — GABAPENTIN 800 MG/1
TABLET ORAL
Qty: 90 TABLET | Refills: 5 | Status: SHIPPED | OUTPATIENT
Start: 2023-03-28

## 2023-03-29 ENCOUNTER — PATIENT OUTREACH (OUTPATIENT)
Dept: FAMILY MEDICINE CLINIC | Facility: CLINIC | Age: 68
End: 2023-03-29

## 2023-03-29 ENCOUNTER — TELEPHONE (OUTPATIENT)
Dept: FAMILY MEDICINE CLINIC | Facility: CLINIC | Age: 68
End: 2023-03-29

## 2023-03-29 NOTE — TELEPHONE ENCOUNTER
PCP SIGNATURE NEEDED FOR [facility]   FORM RECEIVED VIA FAX AND PLACED IN PCP FOLDER TO BE DELIVERED AT ASSIGNED TIMES

## 2023-03-29 NOTE — PROGRESS NOTES
I received a message from the patient stating his pharmacy changed to [facility] and needing a refill of gabapentin  Chart reviewed: Fax received from [facility] and PCP refilled gabapentin yesterday  Emailed patient with the information  I will continue to follow

## 2023-04-19 ENCOUNTER — APPOINTMENT (OUTPATIENT)
Dept: LAB | Facility: CLINIC | Age: 68
End: 2023-04-19

## 2023-04-19 DIAGNOSIS — R25.1 TREMOR: ICD-10-CM

## 2023-04-19 DIAGNOSIS — M79.2 CHRONIC NEUROPATHIC PAIN: ICD-10-CM

## 2023-04-19 DIAGNOSIS — G89.29 CHRONIC NEUROPATHIC PAIN: ICD-10-CM

## 2023-04-19 DIAGNOSIS — M25.50 ARTHRALGIA: ICD-10-CM

## 2023-04-19 DIAGNOSIS — M25.50 ARTHRALGIA, UNSPECIFIED JOINT: ICD-10-CM

## 2023-04-19 LAB — 25(OH)D3 SERPL-MCNC: 14.7 NG/ML (ref 30–100)

## 2023-04-20 LAB
ALBUMIN SERPL ELPH-MCNC: 4.2 G/DL (ref 3.5–5)
ALBUMIN SERPL ELPH-MCNC: 59.2 % (ref 52–65)
ALPHA1 GLOB SERPL ELPH-MCNC: 0.36 G/DL (ref 0.1–0.4)
ALPHA1 GLOB SERPL ELPH-MCNC: 5 % (ref 2.5–5)
ALPHA2 GLOB SERPL ELPH-MCNC: 0.89 G/DL (ref 0.4–1.2)
ALPHA2 GLOB SERPL ELPH-MCNC: 12.6 % (ref 7–13)
ANA SER QL IA: NEGATIVE
B BURGDOR IGG+IGM SER-ACNC: 0.2 AI
BETA GLOB ABNORMAL SERPL ELPH-MCNC: 0.4 G/DL (ref 0.4–0.8)
BETA1 GLOB SERPL ELPH-MCNC: 5.7 % (ref 5–13)
BETA2 GLOB SERPL ELPH-MCNC: 6.4 % (ref 2–8)
BETA2+GAMMA GLOB SERPL ELPH-MCNC: 0.45 G/DL (ref 0.2–0.5)
CERULOPLASMIN SERPL-MCNC: 30.7 MG/DL (ref 16–31)
GAMMA GLOB ABNORMAL SERPL ELPH-MCNC: 0.79 G/DL (ref 0.5–1.6)
GAMMA GLOB SERPL ELPH-MCNC: 11.1 % (ref 12–22)
IGG/ALB SER: 1.45 {RATIO} (ref 1.1–1.8)
PROT PATTERN SERPL ELPH-IMP: ABNORMAL
PROT SERPL-MCNC: 7.1 G/DL (ref 6.4–8.2)
RHEUMATOID FACT SER QL LA: NEGATIVE

## 2023-04-21 DIAGNOSIS — E11.49 OTHER DIABETIC NEUROLOGICAL COMPLICATION ASSOCIATED WITH TYPE 2 DIABETES MELLITUS (HCC): ICD-10-CM

## 2023-04-21 LAB — VIT B12 SERPL-MCNC: 730 PG/ML (ref 100–900)

## 2023-04-23 RX ORDER — GABAPENTIN 100 MG/1
CAPSULE ORAL
Qty: 90 CAPSULE | Refills: 0 | Status: SHIPPED | OUTPATIENT
Start: 2023-04-23

## 2023-04-24 ENCOUNTER — PATIENT OUTREACH (OUTPATIENT)
Dept: FAMILY MEDICINE CLINIC | Facility: CLINIC | Age: 68
End: 2023-04-24

## 2023-04-24 NOTE — PROGRESS NOTES
I called the patient to remind him of his Podiatry appointment for tomorrow at 2pm   I will continue to follow

## 2023-04-25 ENCOUNTER — OFFICE VISIT (OUTPATIENT)
Dept: PODIATRY | Facility: CLINIC | Age: 68
End: 2023-04-25

## 2023-04-25 VITALS
HEART RATE: 74 BPM | SYSTOLIC BLOOD PRESSURE: 158 MMHG | WEIGHT: 130 LBS | HEIGHT: 60 IN | BODY MASS INDEX: 25.52 KG/M2 | DIASTOLIC BLOOD PRESSURE: 100 MMHG

## 2023-04-25 DIAGNOSIS — M21.611 BUNION, RIGHT: Primary | ICD-10-CM

## 2023-04-25 DIAGNOSIS — E11.9 DIABETES (HCC): ICD-10-CM

## 2023-04-25 DIAGNOSIS — F17.200 SMOKER: ICD-10-CM

## 2023-04-25 NOTE — PROGRESS NOTES
Assessment/Plan:       Diagnoses and all orders for this visit:    right Amber  Comments:  asymptomatic, monitor for now    Diabetes [facility])  -     Ambulatory Referral to Podiatry    Smoker      Diagnosis and options discussed with patient  Patient agreeable to the plan as stated below    -DM foot risk is low  Recommend annual DM foot care  -Discussed DM risk to lower extremities, proper shoe gear, and daily monitoring of feet    -Discussed weight loss and suitable exercise regiment  -Reviewed most recent PCP visit on 4/19/2023  He is on gabapentin  -Educated on A1C and the risks of poorly controlled Diabetes  Reviewed recent A1C:  Lab Results   Component Value Date    HGBA1C 8 2 (A) 03/24/2023    HGBA1C 8 6 (H) 04/03/2021     Reviewed recent bloodwork, JAZIEL< RF and lyme were negative    We discussed the relationship between cigarette smoking, atherosclerotic disease, and its effects on the lower extremity  I emphasized the importance of smoking cessation       Subjective:      Patient ID: Erma Roman is a 78 y o  male  Patient presents with thick discolored toenails  They have been deformed for years  He is a type 2 Diabetic, A1C 8 2  He states the nails cause pain  He is trying to quit smoking, down to 5-6 per day  The following portions of the patient's history were reviewed and updated as appropriate: allergies, current medications, past family history, past medical history, past social history, past surgical history and problem list     Review of Systems    Constitutional: Negative  Respiratory: Negative for cough and shortness of breath  Gastrointestinal: Negative for diarrhea, nausea and vomiting  Musculoskeletal: Negative for arthralgias, gait problem, joint swelling and myalgias  Skin: thick toenails  Neurological: Negative for weakness, numbness and headaches           Objective:      /100   Pulse 74   Ht 5' (1 524 m)   Wt 59 kg (130 lb)   BMI 25 39 kg/m²          Physical Exam  Vitals reviewed  Constitutional:       General: He is not in acute distress  Appearance: He is normal weight  He is not toxic-appearing  Cardiovascular:      Rate and Rhythm: Normal rate  Pulses: Normal pulses  no weak pulses          Dorsalis pedis pulses are 2+ on the right side and 2+ on the left side  Posterior tibial pulses are 2+ on the right side and 2+ on the left side  Pulmonary:      Effort: Pulmonary effort is normal  No respiratory distress  Musculoskeletal:      Right foot: Normal range of motion  Deformity and bunion present  No prominent metatarsal heads  Left foot: Normal range of motion  No deformity, bunion or prominent metatarsal heads  Feet:      Right foot:      Protective Sensation: 10 sites tested  10 sites sensed  Skin integrity: Skin integrity normal  No skin breakdown, erythema or dry skin  Toenail Condition: Right toenails are abnormally thick  Left foot:      Protective Sensation: 10 sites tested  10 sites sensed  Skin integrity: Skin integrity normal  No skin breakdown, erythema or dry skin  Toenail Condition: Left toenails are abnormally thick  Skin:     Capillary Refill: Capillary refill takes less than 2 seconds  Neurological:      Mental Status: He is alert  Diabetic Foot Exam    Patient's shoes and socks removed  Right Foot/Ankle   Right Foot Inspection  Skin Exam: skin intact  No dry skin, no erythema, no maceration and no abnormal color  Toe Exam: right toe deformity  No tenderness    Sensory   Vibration: intact  Proprioception: intact  Monofilament testing: intact    Vascular  Capillary refills: < 3 seconds  The right DP pulse is 2+  The right PT pulse is 2+  Right Toe  - Comprehensive Exam  Arch: pes planus  Hammertoes: fifth toe  Hallux valgus: yes      Left Foot/Ankle  Left Foot Inspection  Skin Exam: skin intact  No dry skin, no erythema, no maceration and normal color       Toe Exam: No tenderness and no left toe deformity  Sensory   Vibration: intact  Proprioception: intact  Monofilament testing: intact    Vascular  Capillary refills: < 3 seconds  The left DP pulse is 2+  The left PT pulse is 2+       Left Toe  - Comprehensive Exam  Arch: pes planus  Hammertoes: fifth toe  Hallux valgus: no      Assign Risk Category  Deformity present  No loss of protective sensation  No weak pulses  Risk: 0

## 2023-04-25 NOTE — PATIENT INSTRUCTIONS
Foot Care for People with Diabetes   WHAT YOU NEED TO KNOW:   Long-term high blood sugar levels can damage the blood vessels and nerves in your legs and feet  This damage makes it hard to feel pressure, pain, temperature, and touch  You may not be able to feel a cut or sore, or shoes that are too tight  Foot care is needed to prevent serious problems, such as an infection or amputation  Diabetes may cause your toes to become crooked or curved under  These changes may affect the way you walk and can lead to increased pressure on your foot  The pressure can decrease blood flow to your feet  Lack of blood flow increases your risk for a foot ulcer  DISCHARGE INSTRUCTIONS:   Call your care team provider if:   Your feet become numb, weak, or hard to move  You have pus draining from a sore on your foot  You have a wound on your foot that gets bigger, deeper, or does not heal     You see blisters, cuts, scratches, calluses, or sores on your foot  You have a fever, and your feet become red, warm, and swollen  Your toenails become thick, curled, or yellow  You find it hard to check your feet because your vision is poor  You have questions or concerns about your condition or care  Foot care:   Check your feet each day  Look at your whole foot, including the bottom, and between and under your toes  Check for wounds, corns, and calluses  Use a mirror to see the bottom of your feet  The skin on your feet may be shiny, tight, or darker than normal  Your feet may also be cold and pale  Feel your feet by running your hands along the tops, bottoms, sides, and between your toes  Redness, swelling, and warmth are signs of blood flow problems that can lead to a foot ulcer  Do not try to remove corns or calluses yourself  Do not ignore small problems, such as dry skin or small wounds  These can become life-threatening over time without proper care  Wash your feet each day with soap and warm water    Do not use hot water, because this can injure your foot  Dry your feet gently with a towel after you wash them  Dry between and under your toes  Apply lotion or a moisturizer on your dry feet  Ask your care team provider what lotions are best to use  Do not put lotion or moisturizer between your toes  Moisture between your toes could lead to skin breakdown  Cut your toenails correctly  File or cut your toenails straight across  Use a soft brush to clean around your toenails  If your toenails are very thick, you may need to have a care team provider or specialist cut them  Protect your feet  Do not walk barefoot or wear your shoes without socks  Check your shoes for rocks or other objects that can hurt your feet  Wear cotton socks to help keep your feet dry  Wear socks without toe seams, or wear them with the seams inside out  Change your socks each day  Do not wear socks that are dirty or damp  Wear shoes that fit well  Wear shoes that do not rub against any area of your feet  Your shoes should be ½ to ¾ inch (1 to 2 centimeters) longer than your feet  Your shoes should also have extra space around the widest part of your feet  Walking or athletic shoes with laces or straps that adjust are best  Ask your care team provider for help to choose shoes that fit you best  Ask your provider if you need to wear an insert, orthotic, or bandage on your feet  Do not smoke  Smoking can damage your blood vessels and put you at increased risk for foot ulcers  Ask your care team provider for information if you currently smoke and need help to quit  E-cigarettes or smokeless tobacco still contain nicotine  Talk to your care team provider before you use these products  Follow up with your diabetes care team provider or foot specialist as directed: You will need to have your feet checked at least 1 time each year  You may need a foot exam more often if you have nerve damage, foot deformities, or ulcers   Write down your questions so you remember to ask them during your visits  © Copyright [employer] 2022 Information is for End User's use only and may not be sold, redistributed or otherwise used for commercial purposes  The above information is an  only  It is not intended as medical advice for individual conditions or treatments  Talk to your doctor, nurse or pharmacist before following any medical regimen to see if it is safe and effective for you

## 2023-04-26 ENCOUNTER — TELEPHONE (OUTPATIENT)
Dept: FAMILY MEDICINE CLINIC | Facility: CLINIC | Age: 68
End: 2023-04-26

## 2023-05-03 ENCOUNTER — RA CDI HCC (OUTPATIENT)
Dept: OTHER | Facility: HOSPITAL | Age: 68
End: 2023-05-03

## 2023-05-03 NOTE — PROGRESS NOTES
[facility]  coding opportunities          Chart Reviewed number of suggestions sent to Provider: 2  E11 40  E11 65     Patients Insurance     Medicare Insurance: [facility]

## 2023-05-08 ENCOUNTER — TELEPHONE (OUTPATIENT)
Dept: GASTROENTEROLOGY | Facility: MEDICAL CENTER | Age: 68
End: 2023-05-08

## 2023-05-08 NOTE — TELEPHONE ENCOUNTER
Left message that procedure is on 05/11/2023! Left voice message for patient confirming upcoming procedure  Patient was instructed to call [phone] if they have any questions or concerns about the prep instructions or if they need to change or cancel the procedure

## 2023-05-09 ENCOUNTER — PATIENT OUTREACH (OUTPATIENT)
Dept: FAMILY MEDICINE CLINIC | Facility: CLINIC | Age: 68
End: 2023-05-09

## 2023-05-09 ENCOUNTER — TELEPHONE (OUTPATIENT)
Dept: GASTROENTEROLOGY | Facility: CLINIC | Age: 68
End: 2023-05-09

## 2023-05-09 NOTE — PROGRESS NOTES
I called the patient to remind him of his PCP appointment for tomorrow; he plans on attending  The patient stated he discontinued metformin due to side effect of diarrhea  He notes his blood sugars have been averaging 164  The patient stated he recently had a Podiatry appointment and had his nails trimmed  He was also told he does not have neuropathy  I also reminded the patient of his colonoscopy on 5/11 but he states he would like it rescheduled  I rescheduled it to 7/26 and sent email to the patient informing him  The patient stated he does not want to stay with [facility] as his pharmacy; he will inform PCP tomorrow  I will continue to follow

## 2023-05-09 NOTE — TELEPHONE ENCOUNTER
Patient needing to reschedule colonoscopy   Colonoscopy has been rescheduled for 7/26/23 with Dr Jaiyeola at [address] [place] [address]

## 2023-05-10 ENCOUNTER — TELEPHONE (OUTPATIENT)
Dept: FAMILY MEDICINE CLINIC | Facility: CLINIC | Age: 68
End: 2023-05-10

## 2023-05-10 ENCOUNTER — OFFICE VISIT (OUTPATIENT)
Dept: FAMILY MEDICINE CLINIC | Facility: CLINIC | Age: 68
End: 2023-05-10

## 2023-05-10 VITALS
SYSTOLIC BLOOD PRESSURE: 128 MMHG | HEIGHT: 60 IN | OXYGEN SATURATION: 98 % | WEIGHT: 130.6 LBS | TEMPERATURE: 98 F | DIASTOLIC BLOOD PRESSURE: 74 MMHG | BODY MASS INDEX: 25.64 KG/M2 | HEART RATE: 97 BPM | RESPIRATION RATE: 18 BRPM

## 2023-05-10 DIAGNOSIS — M25.511 CHRONIC PAIN OF BOTH SHOULDERS: Primary | ICD-10-CM

## 2023-05-10 DIAGNOSIS — M79.2 CHRONIC NEUROPATHIC PAIN: ICD-10-CM

## 2023-05-10 DIAGNOSIS — G89.29 CHRONIC PAIN OF BOTH SHOULDERS: Primary | ICD-10-CM

## 2023-05-10 DIAGNOSIS — M25.512 CHRONIC PAIN OF BOTH SHOULDERS: Primary | ICD-10-CM

## 2023-05-10 DIAGNOSIS — G89.29 CHRONIC NEUROPATHIC PAIN: ICD-10-CM

## 2023-05-10 RX ORDER — DULOXETIN HYDROCHLORIDE 30 MG/1
30 CAPSULE, DELAYED RELEASE ORAL 2 TIMES DAILY
Qty: 60 CAPSULE | Refills: 1 | Status: SHIPPED | OUTPATIENT
Start: 2023-05-10

## 2023-05-10 NOTE — ASSESSMENT & PLAN NOTE
Lab Results   Component Value Date    HGBA1C 8 2 (A) 03/24/2023   Reviewed low carb diet and exercise  Stopped Metformin

## 2023-05-10 NOTE — PATIENT INSTRUCTIONS
Decrease gabapentin to 3 times a day starting tomorrow for 2 days  Than decrease to twice a day and start Duloxetine once a day for 2 days    Than stop Gabapentin and increase Duloxetine to twice a day

## 2023-05-10 NOTE — TELEPHONE ENCOUNTER
PCP SIGNATURE NEEDED FOR [facility] ORDER  FORM RECEIVED VIA FAX AND PLACED IN PCP FOLDER TO BE DELIVERED AT ASSIGNED TIMES      [id number]

## 2023-05-10 NOTE — PROGRESS NOTES
Name: Silvana De La Cruz      : 1955      MRN: [id number]  Encounter Provider: Deepak Ervin MD  Encounter Date: 5/10/2023   Encounter department: [facility]     1  Chronic pain of both shoulders  -     Ambulatory Referral to Orthopedic Surgery; Future    2  Chronic neuropathic pain  Assessment & Plan: Will wean off Gabapentin and start on Duloxetine  Again sent for EMG     Orders:  -     DULoxetine (CYMBALTA) 30 mg delayed release capsule; Take 1 capsule (30 mg total) by mouth 2 (two) times a day  -     EMG 2 limb lower extremity; Future           Subjective      79 yo male with DM, neuropathic pain here today for follow up  Continues to complain of bilateral shoulder pain, not improved since starting Celebrex, pain is worse when lifting arms above shoulder level  Stopped Metformin as it was causing increased frequency of bowel movements  Review of Systems   Musculoskeletal: Positive for arthralgias  All other systems reviewed and are negative        Current Outpatient Medications on File Prior to Visit   Medication Sig   • Alcohol Swabs (Alcohol Pads) 70 % PADS Use daily in the early morning   • aspirin (Aspirin Low Dose) 81 mg EC tablet Take 1 tablet (81 mg total) by mouth daily   • atorvastatin (LIPITOR) 10 mg tablet Take 1 tablet (10 mg total) by mouth daily   • bisacodyl (DULCOLAX) 5 mg EC tablet Take as instructed by GI office for bowel prep for colonoscopy   • celecoxib (CeleBREX) 50 MG capsule 50 mg and 100 mg in the evvening   • cholecalciferol (VITAMIN D3) 1,000 units tablet Take 1 tablet (1,000 Units total) by mouth daily   • Continuous Blood Gluc Sensor (FreeStyle Angella 14 Day Sensor) MISC Use 1 Device continuous   • Diclofenac Sodium (VOLTAREN) 1 % Apply 2 g topically 4 (four) times a day (Patient not taking: Reported on 2023)   • dulaglutide (Trulicity) 3 87 EK/4 8XL injection Inject 0 5 mL (0 75 mg total) under the skin every 7 days   • ergocalciferol (VITAMIN D2) 50,000 units Take 1 capsule (50,000 Units total) by mouth once a week   • gabapentin (NEURONTIN) 100 mg capsule take 1 capsule by mouth three times a day   • gabapentin (NEURONTIN) 800 mg tablet Take 1 tablet (800 mg total) by mouth 4 (four) times a day as needed (increased pain) May take an extra dose at bedtime   • glucose blood (FREESTYLE LITE) test strip Check blood sugar 3 times a day   • glucose monitoring kit (FREESTYLE) monitoring kit Use 1 each daily in the early morning   • insulin detemir (Levemir FlexTouch) 100 Units/mL injection pen Inject 15 Units under the skin daily at bedtime   • Insulin Pen Needle 32G X 4 MM MISC Use daily at bedtime   • Lancets (freestyle) lancets Use as instructed   • polyethylene glycol (GOLYTELY) 4000 mL solution Take as instructed by GI office for bowel prep   • traZODone (DESYREL) 50 mg tablet take 1 tablet by mouth at bedtime   • [DISCONTINUED] metFORMIN (GLUCOPHAGE) 1000 MG tablet Take 1 tablet (1,000 mg total) by mouth 2 (two) times a day with meals   • [DISCONTINUED] pantoprazole (PROTONIX) 40 mg tablet take 1 tablet by mouth twice a day (Patient not taking: Reported on 1/18/2023)   • [DISCONTINUED] saccharomyces boulardii (FLORASTOR) 250 mg capsule Take 1 capsule (250 mg total) by mouth 2 (two) times a day (Patient not taking: Reported on 1/18/2023)   • [DISCONTINUED] sildenafil (VIAGRA) 25 MG tablet Take 1 tablet (25 mg total) by mouth daily as needed for erectile dysfunction (Patient not taking: Reported on 4/27/2022)   • [DISCONTINUED] sucralfate (CARAFATE) 1 g tablet take 1 tablet by mouth four times a day       Objective     /74 (BP Location: Left arm, Patient Position: Sitting, Cuff Size: Standard)   Pulse 97   Temp 98 °F (36 7 °C) (Temporal)   Resp 18   Ht 5' (1 524 m)   Wt 59 2 kg (130 lb 9 6 oz)   SpO2 98%   BMI 25 51 kg/m²     Physical Exam  Vitals and nursing note reviewed     Constitutional:       Appearance: He is well-developed  HENT:      Head: Normocephalic  Right Ear: External ear normal       Left Ear: External ear normal       Nose: Nose normal    Eyes:      Conjunctiva/sclera: Conjunctivae normal       Pupils: Pupils are equal, round, and reactive to light  Neck:      Thyroid: No thyromegaly  Cardiovascular:      Rate and Rhythm: Normal rate and regular rhythm  Heart sounds: Normal heart sounds  Pulmonary:      Effort: Pulmonary effort is normal       Breath sounds: Normal breath sounds  Abdominal:      Palpations: Abdomen is soft  Tenderness: There is no abdominal tenderness  There is no guarding or rebound  Musculoskeletal:         General: Tenderness present  Right shoulder: Tenderness present  Decreased range of motion  Left shoulder: Tenderness present  Decreased range of motion  Cervical back: Normal range of motion and neck supple  Skin:     General: Skin is dry  Neurological:      Mental Status: He is alert and oriented to person, place, and time  Deep Tendon Reflexes: Reflexes are normal and symmetric         Kala Noriega MD

## 2023-05-11 ENCOUNTER — OFFICE VISIT (OUTPATIENT)
Dept: OBGYN CLINIC | Facility: MEDICAL CENTER | Age: 68
End: 2023-05-11

## 2023-05-11 ENCOUNTER — APPOINTMENT (OUTPATIENT)
Dept: RADIOLOGY | Facility: MEDICAL CENTER | Age: 68
End: 2023-05-11

## 2023-05-11 VITALS
DIASTOLIC BLOOD PRESSURE: 76 MMHG | HEART RATE: 68 BPM | BODY MASS INDEX: 25.52 KG/M2 | SYSTOLIC BLOOD PRESSURE: 124 MMHG | HEIGHT: 60 IN | WEIGHT: 130 LBS

## 2023-05-11 DIAGNOSIS — M75.80 ROTATOR CUFF TENDINITIS, UNSPECIFIED LATERALITY: ICD-10-CM

## 2023-05-11 DIAGNOSIS — M25.512 LEFT SHOULDER PAIN, UNSPECIFIED CHRONICITY: ICD-10-CM

## 2023-05-11 DIAGNOSIS — M25.512 ACUTE PAIN OF LEFT SHOULDER: Primary | ICD-10-CM

## 2023-05-11 DIAGNOSIS — M19.011 PRIMARY OSTEOARTHRITIS OF BOTH SHOULDERS: ICD-10-CM

## 2023-05-11 DIAGNOSIS — M19.012 PRIMARY OSTEOARTHRITIS OF BOTH SHOULDERS: ICD-10-CM

## 2023-05-11 DIAGNOSIS — M25.511 ACUTE PAIN OF RIGHT SHOULDER: ICD-10-CM

## 2023-05-11 DIAGNOSIS — M25.511 RIGHT SHOULDER PAIN, UNSPECIFIED CHRONICITY: ICD-10-CM

## 2023-05-11 RX ORDER — METHYLPREDNISOLONE 4 MG/1
TABLET ORAL
Qty: 1 EACH | Refills: 0 | Status: SHIPPED | OUTPATIENT
Start: 2023-05-11

## 2023-05-11 NOTE — PROGRESS NOTES
Assessment/Plan:    Diagnoses and all orders for this visit:    Acute pain of left shoulder  -     XR shoulder 2+ vw left; Future  -     methylPREDNISolone 4 MG tablet therapy pack; Use as directed on package  -     Ambulatory Referral to Physical Therapy; Future    Primary osteoarthritis of both shoulders  -     Ambulatory Referral to Orthopedic Surgery  -     methylPREDNISolone 4 MG tablet therapy pack; Use as directed on package  -     Ambulatory Referral to Physical Therapy; Future    Acute pain of right shoulder  -     XR shoulder 2+ vw right; Future  -     methylPREDNISolone 4 MG tablet therapy pack; Use as directed on package  -     Ambulatory Referral to Physical Therapy; Future    Rotator cuff tendinitis, unspecified laterality  -     methylPREDNISolone 4 MG tablet therapy pack; Use as directed on package  -     Ambulatory Referral to Physical Therapy; Future    Patient with 1 month of bilateral shoulder pain x-rays obtained today reveal osteoarthritis bilateral glenohumeral joints right worse than left  Discussed treatment options patient would like to proceed with a Medrol Dosepak and declines steroid injections today  We will start formal physical therapy working on strengthening of the shoulders  Patient treats for DM, thus discussed risks of hyperglycemia from steroid injections  Patient understands and has elected to proceed with procedure  I have reviewed previous HA1c 8 2  Discussed speaking with Dr Kodak Santana regarding adjusting insulin if needed  Denies benefit from Celebrex    Return in about 6 weeks (around 6/22/2023)  Subjective:   Patient ID: Rebecca Herman is a 78 y o  male  New patient presents referred by PCP Dr Kodak Santana for 1 month of bilateral shoulder pain  Review of Systems    The following portions of the patient's chart were reviewed and updated as appropriate:    Allergy:  No Known Allergies    Medications:    Current Outpatient Medications:   •  Alcohol Swabs (Alcohol Pads) 70 % PADS, Use daily in the early morning, Disp: 100 each, Rfl: 5  •  aspirin (Aspirin Low Dose) 81 mg EC tablet, Take 1 tablet (81 mg total) by mouth daily, Disp: 90 tablet, Rfl: 2  •  atorvastatin (LIPITOR) 10 mg tablet, Take 1 tablet (10 mg total) by mouth daily, Disp: 90 tablet, Rfl: 3  •  bisacodyl (DULCOLAX) 5 mg EC tablet, Take as instructed by GI office for bowel prep for colonoscopy, Disp: 2 tablet, Rfl: 0  •  celecoxib (CeleBREX) 50 MG capsule, 50 mg and 100 mg in the evvening, Disp: 270 capsule, Rfl: 0  •  cholecalciferol (VITAMIN D3) 1,000 units tablet, Take 1 tablet (1,000 Units total) by mouth daily, Disp: 90 tablet, Rfl: 2  •  Continuous Blood Gluc Sensor (FreeStyle Angella 14 Day Sensor) MISC, Use 1 Device continuous, Disp: 6 each, Rfl: 1  •  dulaglutide (Trulicity) 5 18 IH/6 1OC injection, Inject 0 5 mL (0 75 mg total) under the skin every 7 days, Disp: 6 mL, Rfl: 1  •  DULoxetine (CYMBALTA) 30 mg delayed release capsule, Take 1 capsule (30 mg total) by mouth 2 (two) times a day, Disp: 60 capsule, Rfl: 1  •  ergocalciferol (VITAMIN D2) 50,000 units, Take 1 capsule (50,000 Units total) by mouth once a week, Disp: 12 capsule, Rfl: 1  •  gabapentin (NEURONTIN) 100 mg capsule, take 1 capsule by mouth three times a day, Disp: 90 capsule, Rfl: 0  •  gabapentin (NEURONTIN) 800 mg tablet, Take 1 tablet (800 mg total) by mouth 4 (four) times a day as needed (increased pain) May take an extra dose at bedtime, Disp: 100 tablet, Rfl: 5  •  glucose monitoring kit (FREESTYLE) monitoring kit, Use 1 each daily in the early morning, Disp: 1 each, Rfl: 0  •  Lancets (freestyle) lancets, Use as instructed, Disp: 100 each, Rfl: 5  •  methylPREDNISolone 4 MG tablet therapy pack, Use as directed on package, Disp: 1 each, Rfl: 0  •  polyethylene glycol (GOLYTELY) 4000 mL solution, Take as instructed by GI office for bowel prep, Disp: 4000 mL, Rfl: 0  •  traZODone (DESYREL) 50 mg tablet, take 1 tablet by mouth at bedtime, Disp: 30 tablet, Rfl: 5  •  Diclofenac Sodium (VOLTAREN) 1 %, Apply 2 g topically 4 (four) times a day (Patient not taking: Reported on 1/18/2023), Disp: 2 g, Rfl: 2  •  glucose blood (FREESTYLE LITE) test strip, Check blood sugar 3 times a day, Disp: 100 each, Rfl: 11  •  insulin detemir (Levemir FlexTouch) 100 Units/mL injection pen, Inject 15 Units under the skin daily at bedtime, Disp: 15 mL, Rfl: 2  •  Insulin Pen Needle 32G X 4 MM MISC, Use daily at bedtime, Disp: 100 each, Rfl: 5    Patient Active Problem List   Diagnosis   • Type 2 diabetes mellitus with other specified complication (HCC)   • Diabetic neuropathy ([facility] )   • History of heroin abuse ([facility] )   • Anxiety   • Chronic gastritis   • Asthma due to environmental allergies   • JESSICA (obstructive sleep apnea)   • Erectile dysfunction due to diseases classified elsewhere   • Bunion, right   • Smoker   • Functional diarrhea   • Chronic neuropathic pain   • Pure hypercholesterolemia   • Muscle spasm   • Vaccine refused by patient   • COVID-19 virus infection       Objective:  /76   Pulse 68   Ht 5' (1 524 m)   Wt 59 kg (130 lb)   BMI 25 39 kg/m²     Right Shoulder Exam     Range of Motion   Active abduction: abnormal   External rotation: normal   Internal rotation 0 degrees: abnormal     Muscle Strength   External rotation: 5/5       Left Shoulder Exam     Range of Motion   Active abduction: abnormal   External rotation: normal   Internal rotation 0 degrees: abnormal     Muscle Strength   External rotation: 5/5             Physical Exam      Neurologic Exam    Procedures    I have personally reviewed pertinent films in PACS and my interpretation is Xrays B/L Shoulder reveal degenerative changes and osteoarthritis b/l shoulders R>L  Past Medical History:   Diagnosis Date   • Diabetes mellitus ([facility] )    • Hyperlipidemia        History reviewed  No pertinent surgical history      Social History     Socioeconomic History   • Marital status: Single     Spouse name: Not on file   • Number of children: Not on file   • Years of education: Not on file   • Highest education level: Not on file   Occupational History   • Not on file   Tobacco Use   • Smoking status: Every Day     Packs/day: 0 25     Types: Cigarettes   • Smokeless tobacco: Never   Vaping Use   • Vaping Use: Never used   Substance and Sexual Activity   • Alcohol use: Not Currently   • Drug use: Never   • Sexual activity: Not Currently   Other Topics Concern   • Not on file   Social History Narrative   • Not on file     Social Determinants of Health     Financial Resource Strain: Low Risk    • Difficulty of Paying Living Expenses: Not very hard   Food Insecurity: Not on file   Transportation Needs: No Transportation Needs   • Lack of Transportation (Medical): No   • Lack of Transportation (Non-Medical): No   Physical Activity: Not on file   Stress: Not on file   Social Connections: Not on file   Intimate Partner Violence: Not on file   Housing Stability: Not on file       History reviewed  No pertinent family history

## 2023-05-12 ENCOUNTER — PATIENT OUTREACH (OUTPATIENT)
Dept: FAMILY MEDICINE CLINIC | Facility: CLINIC | Age: 68
End: 2023-05-12

## 2023-05-12 NOTE — PROGRESS NOTES
I received a message on my voicemail from the patient stating he was prescribed steroids and was told to ask PCP if his DM meds need to be adjusted during this time; note sent to PCP

## 2023-05-12 NOTE — PROGRESS NOTES
I called patient to inform him of PCP's note  The patient stated he has been weaning the gabapentin as ordered and should be starting duloxetine but did not hear anything from his pharmacy  I called the pharmacy and was told the med will be ready this afternoon >2pm   I called the patient to inform him and he was appreciative of the help

## 2023-05-12 NOTE — PROGRESS NOTES
There is nothing we can adjust short term  He should be more mindful of his diet   BG will go back to baseline once he finishes the steroid taper

## 2023-05-13 NOTE — TELEPHONE ENCOUNTER
FAXED ON 5/12/23 TO [facility]  at [phone]  FAX CONFIRMATION RECEIVED   SCANNED INTO CHART

## 2023-05-17 ENCOUNTER — PATIENT OUTREACH (OUTPATIENT)
Dept: FAMILY MEDICINE CLINIC | Facility: CLINIC | Age: 68
End: 2023-05-17

## 2023-05-17 NOTE — PROGRESS NOTES
I received a call from the patient stating he has not been feeling well for ~6 days  He notes he feels like he is in withdrawal with symptoms of decreased energy, lack of interest in doing things and a possible fever; note sent to PCP  The patient stated he cancelled a camping trip for this weekend  The patient reports blood sugars at 68 for 3 days  He stated he drank OJ and ate oatmeal and his blood sugar was 246  He questions if his symptoms are the result of weaning too quickly off gabapentin  He stated he weaned as directed and is now currently taking duloxetine bid  I asked the patient to call if no better in 2 days and provided ED precautions

## 2023-06-20 DIAGNOSIS — E11.69 TYPE 2 DIABETES MELLITUS WITH OTHER SPECIFIED COMPLICATION, WITH LONG-TERM CURRENT USE OF INSULIN (HCC): ICD-10-CM

## 2023-06-20 DIAGNOSIS — Z79.4 TYPE 2 DIABETES MELLITUS WITH OTHER SPECIFIED COMPLICATION, WITH LONG-TERM CURRENT USE OF INSULIN (HCC): ICD-10-CM

## 2023-06-20 RX ORDER — ASPIRIN 81 MG/1
TABLET, COATED ORAL
Qty: 90 TABLET | Refills: 11 | Status: SHIPPED | OUTPATIENT
Start: 2023-06-20

## 2023-06-21 ENCOUNTER — PATIENT OUTREACH (OUTPATIENT)
Dept: FAMILY MEDICINE CLINIC | Facility: CLINIC | Age: 68
End: 2023-06-21

## 2023-06-21 NOTE — PROGRESS NOTES
06/21/23    first attempt to contact patient  no answer    Left message       If Pt contacts office, please assist Pt with scheduling an appt      Appt Note: f/u DM (IRIS Exam)

## 2023-06-21 NOTE — PROGRESS NOTES
I called the patient to follow up  He states he is feeling well  He reports he went through withdrawal for over a week  from weaning off gabapentin  and then had a pinched nerve  The patient notes much improvement in his pain symptoms with Cymbalta  The patient reports his 90 day average of blood sugars was 164  He did admit to not adhering to his diet over Father's Day  The patient states he is checking his feet daily and denies any skin breakdown  I reminded the patient of his Ortho appointment for tomorrow at noon; he plans on attending  Patient is due for A1C; note sent to PCP  He is also due for a PCP appointment in August; note sent to clerical     I will continue to follow

## 2023-06-28 DIAGNOSIS — M79.2 CHRONIC NEUROPATHIC PAIN: ICD-10-CM

## 2023-06-28 DIAGNOSIS — G89.29 CHRONIC NEUROPATHIC PAIN: ICD-10-CM

## 2023-06-30 RX ORDER — DULOXETIN HYDROCHLORIDE 30 MG/1
CAPSULE, DELAYED RELEASE ORAL
Qty: 60 CAPSULE | Refills: 1 | Status: SHIPPED | OUTPATIENT
Start: 2023-06-30

## 2023-07-10 ENCOUNTER — TELEPHONE (OUTPATIENT)
Dept: GASTROENTEROLOGY | Facility: MEDICAL CENTER | Age: 68
End: 2023-07-10

## 2023-07-10 NOTE — TELEPHONE ENCOUNTER
Left a message attempting to confirm 7/26 procedure. Informed patient that he needs a  for procedure. Informed patient that bowel prep medication sent to [facility] on [address] and that instructions sent via [employer]. Also asked patient to call back in order to ask [facility]note [facility] and provided callback number.

## 2023-07-25 ENCOUNTER — PATIENT OUTREACH (OUTPATIENT)
Dept: FAMILY MEDICINE CLINIC | Facility: CLINIC | Age: 68
End: 2023-07-25

## 2023-07-25 RX ORDER — SODIUM CHLORIDE 9 MG/ML
125 INJECTION, SOLUTION INTRAVENOUS CONTINUOUS
Status: CANCELLED | OUTPATIENT
Start: 2023-07-25

## 2023-07-25 NOTE — PROGRESS NOTES
I called the patient to follow up. He states he has been feeling well. He reports his blood sugars have been stable with the following readings:  Past 7d average 123; past 14d average 125; past 30d average 146; past 90d average 161. The patient had a fasting reading of 113 today. He states he has been checking his feet daily and denies any skin breakdown. The patient reports his shoulder pain has slightly improved. He states he continues taking Cymbalta which has been helpful. He questions if this can cause shakiness. The patient notes he always had a tremor but it is getting worse; patient is scheduled for EMGs 8/8 . He also reports family members who shook as well. The patient also reports increased fatigue and having much less energy. The patient is aware of his colonoscopy for tomorrow and states he has his prep. I will continue to follow.

## 2023-07-26 ENCOUNTER — ANESTHESIA (OUTPATIENT)
Dept: GASTROENTEROLOGY | Facility: MEDICAL CENTER | Age: 68
End: 2023-07-26

## 2023-07-26 ENCOUNTER — ANESTHESIA EVENT (OUTPATIENT)
Dept: GASTROENTEROLOGY | Facility: MEDICAL CENTER | Age: 68
End: 2023-07-26

## 2023-07-26 ENCOUNTER — HOSPITAL ENCOUNTER (OUTPATIENT)
Dept: GASTROENTEROLOGY | Facility: MEDICAL CENTER | Age: 68
Setting detail: OUTPATIENT SURGERY
Discharge: HOME | End: 2023-07-26
Attending: INTERNAL MEDICINE
Payer: COMMERCIAL

## 2023-07-26 VITALS
OXYGEN SATURATION: 100 % | TEMPERATURE: 98.2 F | RESPIRATION RATE: 20 BRPM | DIASTOLIC BLOOD PRESSURE: 53 MMHG | BODY MASS INDEX: 25.52 KG/M2 | SYSTOLIC BLOOD PRESSURE: 91 MMHG | WEIGHT: 130 LBS | HEART RATE: 74 BPM | HEIGHT: 60 IN

## 2023-07-26 DIAGNOSIS — Z12.11 COLON CANCER SCREENING: ICD-10-CM

## 2023-07-26 LAB — GLUCOSE SERPL-MCNC: 128 MG/DL (ref 65–140)

## 2023-07-26 PROCEDURE — 82948 REAGENT STRIP/BLOOD GLUCOSE: CPT

## 2023-07-26 PROCEDURE — 45378 DIAGNOSTIC COLONOSCOPY: CPT | Performed by: INTERNAL MEDICINE

## 2023-07-26 RX ORDER — LIDOCAINE HYDROCHLORIDE 20 MG/ML
INJECTION, SOLUTION EPIDURAL; INFILTRATION; INTRACAUDAL; PERINEURAL AS NEEDED
Status: DISCONTINUED | OUTPATIENT
Start: 2023-07-26 | End: 2023-07-26

## 2023-07-26 RX ORDER — PROPOFOL 10 MG/ML
INJECTION, EMULSION INTRAVENOUS AS NEEDED
Status: DISCONTINUED | OUTPATIENT
Start: 2023-07-26 | End: 2023-07-26

## 2023-07-26 RX ORDER — SODIUM CHLORIDE 9 MG/ML
125 INJECTION, SOLUTION INTRAVENOUS CONTINUOUS
Status: DISCONTINUED | OUTPATIENT
Start: 2023-07-26 | End: 2023-07-30 | Stop reason: HOSPADM

## 2023-07-26 RX ADMIN — PROPOFOL 120 MG: 10 INJECTION, EMULSION INTRAVENOUS at 10:27

## 2023-07-26 RX ADMIN — SODIUM CHLORIDE 125 ML/HR: 0.9 INJECTION, SOLUTION INTRAVENOUS at 10:20

## 2023-07-26 RX ADMIN — LIDOCAINE HYDROCHLORIDE 5 ML: 20 INJECTION, SOLUTION EPIDURAL; INFILTRATION; INTRACAUDAL at 10:27

## 2023-07-26 NOTE — ANESTHESIA POSTPROCEDURE EVALUATION
Post-Op Assessment Note    CV Status:  Stable    Pain management: adequate     Mental Status:  Alert and awake   Hydration Status:  Euvolemic   PONV Controlled:  Controlled   Airway Patency:  Patent      Post Op Vitals Reviewed: Yes            No notable events documented.     BP      Temp      Pulse     Resp      SpO2      BP 91/53   Pulse 74   Temp 98.2 °F (36.8 °C) (Temporal)   Resp 20   Ht 5' (1.524 m)   Wt 59 kg (130 lb)   SpO2 100%   BMI 25.39 kg/m²

## 2023-07-26 NOTE — ANESTHESIA PREPROCEDURE EVALUATION
Procedure:  COLONOSCOPY    Relevant Problems   CARDIO   (+) Pure hypercholesterolemia      ENDO   (+) Type 2 diabetes mellitus with other specified complication (HCC)      NEURO/PSYCH   (+) Anxiety   (+) Chronic neuropathic pain   (+) Diabetic neuropathy (HCC)      PULMONARY   (+) Asthma due to environmental allergies   (+) JESSICA (obstructive sleep apnea)   (+) Smoker        Physical Exam    Airway    Mallampati score: II  TM Distance: >3 FB  Neck ROM: full     Dental   No notable dental hx     Cardiovascular  Cardiovascular exam normal    Pulmonary  Pulmonary exam normal     Other Findings        Anesthesia Plan  ASA Score- 2     Anesthesia Type- IV sedation with anesthesia with ASA Monitors. Additional Monitors:   Airway Plan:           Plan Factors-Exercise tolerance (METS): >4 METS. Chart reviewed. Patient is a current smoker. Patient instructed to abstain from smoking on day of procedure. Patient smoked on day of surgery. Obstructive sleep apnea risk education given perioperatively. Induction- intravenous. Postoperative Plan-     Informed Consent- Anesthetic plan and risks discussed with patient.

## 2023-07-26 NOTE — H&P
H&P EXAM - Outpatient Endoscopy   Destin Mondragon 78 y.o. male MRN: [id number]    [facility]   Encounter: [id number]      History and Physical -  [facility]  Destin Mondragon 78 y.o. male MRN: [id number]                  HPI: Destin Mondragon is a 78y.o. year old male who presents for colon cancer screening      REVIEW OF SYSTEMS: Per the HPI, and otherwise unremarkable. Historical Information   Past Medical History:   Diagnosis Date   • Diabetes mellitus ([address])    • Hyperlipidemia      Past Surgical History:   Procedure Laterality Date   • FRACTURE SURGERY      right arm     Social History   Social History     Substance and Sexual Activity   Alcohol Use Not Currently     Social History     Substance and Sexual Activity   Drug Use Never     Social History     Tobacco Use   Smoking Status Every Day   • Packs/day: 0.25   • Types: Cigarettes   Smokeless Tobacco Never     History reviewed. No pertinent family history. Meds/Allergies     (Not in a hospital admission)      No Known Allergies    Objective     /65   Pulse 63   Temp 98.2 °F (36.8 °C) (Temporal)   Resp 20   Ht 5' (1.524 m)   Wt 59 kg (130 lb)   SpO2 98%   BMI 25.39 kg/m²       PHYSICAL EXAM    Gen: NAD  CV: RRR  CHEST: Clear  ABD: soft, NT/ND  EXT: no edema      ASSESSMENT/PLAN:  This is a 78y.o. year old male here for colonoscopy, and he is stable and optimized for his procedure.

## 2023-07-27 ENCOUNTER — TELEPHONE (OUTPATIENT)
Dept: GASTROENTEROLOGY | Facility: MEDICAL CENTER | Age: 68
End: 2023-07-27

## 2023-07-27 DIAGNOSIS — Z12.11 COLON CANCER SCREENING: Primary | ICD-10-CM

## 2023-07-27 RX ORDER — BISACODYL 5 MG/1
5 TABLET, DELAYED RELEASE ORAL ONCE
Qty: 2 TABLET | Refills: 0 | Status: SHIPPED | OUTPATIENT
Start: 2023-07-27 | End: 2023-07-27

## 2023-07-27 RX ORDER — POLYETHYLENE GLYCOL 3350 17 G/17G
POWDER, FOR SOLUTION ORAL
Qty: 238 G | Refills: 0 | Status: SHIPPED | OUTPATIENT
Start: 2023-07-27

## 2023-07-27 NOTE — TELEPHONE ENCOUNTER
Scheduled date of colonoscopy (as of today): 8/11/23    Physician performing colonoscopy: Dr. Sabrina Miller    Location of colonoscopy: [facility]

## 2023-07-27 NOTE — TELEPHONE ENCOUNTER
The August procedure has been cancelled as the procedure must be in 3 months. Patient aware and agreeable to 10/27/23 at Dr. Caroline Ferguson. Informed patient that prep sent via [employer]. Patient requesting all prep medications be sent to his pharmacy. [address] [facility],[address] questions reviewed.

## 2023-07-27 NOTE — TELEPHONE ENCOUNTER
----- Message from Simon Nuñez MD sent at 7/26/2023 10:31 AM EDT -----  Please repeat schedule repeat colonoscopy in 3 months with 2-day bowel prep.   Thank you

## 2023-08-14 DIAGNOSIS — F41.9 ANXIETY: ICD-10-CM

## 2023-08-17 ENCOUNTER — PATIENT OUTREACH (OUTPATIENT)
Dept: FAMILY MEDICINE CLINIC | Facility: CLINIC | Age: 68
End: 2023-08-17

## 2023-08-17 NOTE — PROGRESS NOTES
I called the patient but received voicemail. Message was left reminding him of his PCP appointment for tomorrow. Email reminder was also sent. I will continue further outreach.

## 2023-08-19 RX ORDER — TRAZODONE HYDROCHLORIDE 50 MG/1
TABLET ORAL
Qty: 30 TABLET | Refills: 11 | OUTPATIENT
Start: 2023-08-19

## 2023-08-30 DIAGNOSIS — G89.29 CHRONIC NEUROPATHIC PAIN: ICD-10-CM

## 2023-08-30 DIAGNOSIS — M79.2 CHRONIC NEUROPATHIC PAIN: ICD-10-CM

## 2023-08-30 RX ORDER — DULOXETIN HYDROCHLORIDE 30 MG/1
CAPSULE, DELAYED RELEASE ORAL
Qty: 60 CAPSULE | Refills: 1 | Status: SHIPPED | OUTPATIENT
Start: 2023-08-30

## 2023-09-08 DIAGNOSIS — F41.9 ANXIETY: ICD-10-CM

## 2023-09-08 RX ORDER — TRAZODONE HYDROCHLORIDE 50 MG/1
TABLET ORAL
Qty: 30 TABLET | Refills: 4 | Status: SHIPPED | OUTPATIENT
Start: 2023-09-08

## 2023-09-14 ENCOUNTER — TELEPHONE (OUTPATIENT)
Dept: NEUROLOGY | Facility: CLINIC | Age: 68
End: 2023-09-14

## 2023-09-14 NOTE — TELEPHONE ENCOUNTER
Called and spoke to patient - confirmed upcoming appointment with Jay Brown on 09/19/23 11:45 am at the [facility]. Provided patient with apt date, time and location. Informed patient that check in is at least 15 minutes prior to apt time. The patient is not  having any issues or concerns at this time.

## 2023-09-15 ENCOUNTER — TELEPHONE (OUTPATIENT)
Dept: FAMILY MEDICINE CLINIC | Facility: CLINIC | Age: 68
End: 2023-09-15

## 2023-09-15 NOTE — TELEPHONE ENCOUNTER
PCP SIGNATURE NEEDED FOR [facility] FORM RECEIVED VIA FAX AND PLACED IN PCP FOLDER TO BE DELIVERED AT ASSIGNED TIMES.       New Rx request

## 2023-09-18 ENCOUNTER — PATIENT OUTREACH (OUTPATIENT)
Dept: FAMILY MEDICINE CLINIC | Facility: CLINIC | Age: 68
End: 2023-09-18

## 2023-09-18 NOTE — PROGRESS NOTES
I called the patient to follow up. He reports his blood sugars have been elevated with the following average readings:  Past 7d, 206; past 14d, 208; past 30d, 202 and last 90d, 173. The patient's blood sugar during this call was 188. The patient admits to noncompliance with his diet but also admits he stopped both metformin and trulicity ~6 weeks ago. The patient did state he will be restarting them but needs a new metformin order; request sent to PCP. The patient notes he cut his toenails and denies any skin breakdown. The patient reports he has been losing weight without trying. He states his clothing is loose and people have noticed the change. I informed the patient he missed his last PCP appointment; note sent to clerical.      I reminded the patient of his Neuro appointment for tomorrow but he did not have his EMGs. Patient stated he will cancel this appointment and reschedule after he has the study done. I will continue to follow.

## 2023-09-20 DIAGNOSIS — Z79.4 TYPE 2 DIABETES MELLITUS WITH OTHER SPECIFIED COMPLICATION, WITH LONG-TERM CURRENT USE OF INSULIN (HCC): Primary | ICD-10-CM

## 2023-09-20 DIAGNOSIS — E11.69 TYPE 2 DIABETES MELLITUS WITH OTHER SPECIFIED COMPLICATION, WITH LONG-TERM CURRENT USE OF INSULIN (HCC): Primary | ICD-10-CM

## 2023-09-28 ENCOUNTER — PATIENT OUTREACH (OUTPATIENT)
Dept: FAMILY MEDICINE CLINIC | Facility: CLINIC | Age: 68
End: 2023-09-28

## 2023-09-28 NOTE — PROGRESS NOTES
I called the patient to remind him of his PCP appointment tomorrow at [phone]; he plans on attending. I will continue to follow.

## 2023-09-29 ENCOUNTER — OFFICE VISIT (OUTPATIENT)
Dept: FAMILY MEDICINE CLINIC | Facility: CLINIC | Age: 68
End: 2023-09-29

## 2023-09-29 VITALS
OXYGEN SATURATION: 96 % | TEMPERATURE: 97.9 F | WEIGHT: 118.2 LBS | SYSTOLIC BLOOD PRESSURE: 126 MMHG | DIASTOLIC BLOOD PRESSURE: 76 MMHG | RESPIRATION RATE: 17 BRPM | BODY MASS INDEX: 23.2 KG/M2 | HEART RATE: 101 BPM | HEIGHT: 60 IN

## 2023-09-29 DIAGNOSIS — M25.512 CHRONIC LEFT SHOULDER PAIN: ICD-10-CM

## 2023-09-29 DIAGNOSIS — Z79.4 TYPE 2 DIABETES MELLITUS WITH OTHER SPECIFIED COMPLICATION, WITH LONG-TERM CURRENT USE OF INSULIN (HCC): Primary | ICD-10-CM

## 2023-09-29 DIAGNOSIS — Z11.59 NEED FOR HEPATITIS C SCREENING TEST: ICD-10-CM

## 2023-09-29 DIAGNOSIS — E11.69 TYPE 2 DIABETES MELLITUS WITH OTHER SPECIFIED COMPLICATION, WITH LONG-TERM CURRENT USE OF INSULIN (HCC): Primary | ICD-10-CM

## 2023-09-29 DIAGNOSIS — G89.29 CHRONIC LEFT SHOULDER PAIN: ICD-10-CM

## 2023-09-29 LAB — SL AMB POCT HEMOGLOBIN AIC: 9.2 (ref ?–6.5)

## 2023-09-29 PROCEDURE — 83036 HEMOGLOBIN GLYCOSYLATED A1C: CPT | Performed by: FAMILY MEDICINE

## 2023-09-29 PROCEDURE — 99214 OFFICE O/P EST MOD 30 MIN: CPT | Performed by: FAMILY MEDICINE

## 2023-09-29 NOTE — PROGRESS NOTES
Name: Doyle Platt      : 1955      MRN: [id number]  Encounter Provider: Abrahan Olmstead MD  Encounter Date: 2023   Encounter department: [address] [facility]     1. Type 2 diabetes mellitus with other specified complication, with long-term current use of insulin (HCC)  Comments:  Stop Metformin due to diarrhea. Restart Trulicity. Start Farxiga 10 mg daily   Orders:  -     POCT hemoglobin A1c  -     dapagliflozin (Farxiga) 10 MG tablet; Take 1 tablet (10 mg total) by mouth daily  -     Comprehensive metabolic panel; Future  -     CBC and differential; Future  -     Lipid panel; Future  -     Albumin / creatinine urine ratio; Future  -     TSH, 3rd generation with Free T4 reflex; Future    2. Need for hepatitis C screening test  -     Hepatitis C Antibody; Future    3. Chronic left shoulder pain  -     Ambulatory Referral to Physical Therapy; Future           Subjective      79 yo male with DM  States stopped Trulicity because he felt he was on too much medications  Shoulder pain mildly improved but still having decreased range of motion   Denies s/s of hypo or hyperglycemia     Review of Systems   Musculoskeletal: Positive for arthralgias. All other systems reviewed and are negative.       Current Outpatient Medications on File Prior to Visit   Medication Sig   • Alcohol Swabs (Alcohol Pads) 70 % PADS Use daily in the early morning   • Aspirin Low Dose 81 MG EC tablet TAKE ONE TABLET BY MOUTH DAILY AT 9AM   • atorvastatin (LIPITOR) 10 mg tablet Take 1 tablet (10 mg total) by mouth daily   • celecoxib (CeleBREX) 50 MG capsule 50 mg and 100 mg in the evvening   • cholecalciferol (VITAMIN D3) 1,000 units tablet Take 1 tablet (1,000 Units total) by mouth daily   • Continuous Blood Gluc Sensor (FreeStyle Angella 14 Day Sensor) MISC Use 1 Device continuous   • Diclofenac Sodium (VOLTAREN) 1 % Apply 2 g topically 4 (four) times a day (Patient not taking: Reported on 1/18/2023)   • dulaglutide (Trulicity) 7.03 CH/4.2HO injection Inject 0.5 mL (0.75 mg total) under the skin every 7 days   • DULoxetine (CYMBALTA) 30 mg delayed release capsule take 1 capsule by mouth twice a day   • ergocalciferol (VITAMIN D2) 50,000 units Take 1 capsule (50,000 Units total) by mouth once a week   • gabapentin (NEURONTIN) 100 mg capsule take 1 capsule by mouth three times a day (Patient not taking: Reported on 7/26/2023)   • gabapentin (NEURONTIN) 800 mg tablet Take 1 tablet (800 mg total) by mouth 4 (four) times a day as needed (increased pain) May take an extra dose at bedtime (Patient not taking: Reported on 7/26/2023)   • glucose blood (FREESTYLE LITE) test strip Check blood sugar 3 times a day   • glucose monitoring kit (FREESTYLE) monitoring kit Use 1 each daily in the early morning   • Insulin Pen Needle 32G X 4 MM MISC Use daily at bedtime   • Lancets (freestyle) lancets Use as instructed   • metFORMIN (GLUCOPHAGE) 1000 MG tablet Take 1 tablet (1,000 mg total) by mouth 2 (two) times a day with meals   • polyethylene glycol (GOLYTELY) 4000 mL solution Take 4,000 mL by mouth once for 1 dose   • traZODone (DESYREL) 50 mg tablet TAKE ONE TABLET BY MOUTH DAILY AT 9PM AT BEDTIME   • [DISCONTINUED] bisacodyl (DULCOLAX) 5 mg EC tablet Take 1 tablet (5 mg total) by mouth once for 1 dose   • [DISCONTINUED] insulin detemir (Levemir FlexTouch) 100 Units/mL injection pen Inject 15 Units under the skin daily at bedtime   • [DISCONTINUED] methylPREDNISolone 4 MG tablet therapy pack Use as directed on package   • [DISCONTINUED] polyethylene glycol (GLYCOLAX) 17 GM/SCOOP powder At 5pm take 5mgx2 dulcolax. At 6pm 32oz miralax in 64oz gatorade. Drink 8oz glass every 5 mins until 32oz finished. Drink remaining as rec.        Objective     /76 (BP Location: Left arm, Patient Position: Sitting, Cuff Size: Standard)   Pulse 101   Temp 97.9 °F (36.6 °C) (Temporal)   Resp 17   Ht 5' (1.524 m)   Wt 53.6 kg (118 lb 3.2 oz)   SpO2 96%   BMI 23.08 kg/m²     Physical Exam  Vitals and nursing note reviewed. Constitutional:       Appearance: He is well-developed. HENT:      Head: Normocephalic. Right Ear: External ear normal.      Left Ear: External ear normal.      Nose: Nose normal.   Eyes:      Conjunctiva/sclera: Conjunctivae normal.      Pupils: Pupils are equal, round, and reactive to light. Neck:      Thyroid: No thyromegaly. Cardiovascular:      Rate and Rhythm: Normal rate and regular rhythm. Heart sounds: Normal heart sounds. Pulmonary:      Effort: Pulmonary effort is normal.      Breath sounds: Normal breath sounds. Abdominal:      Palpations: Abdomen is soft. Tenderness: There is no abdominal tenderness. There is no guarding or rebound. Musculoskeletal:         General: Tenderness present. Right shoulder: Normal.      Left shoulder: Tenderness present. Decreased range of motion. Decreased strength. Cervical back: Normal range of motion and neck supple. Skin:     General: Skin is dry. Neurological:      Mental Status: He is alert and oriented to person, place, and time. Deep Tendon Reflexes: Reflexes are normal and symmetric.        Elaine Martinez MD

## 2023-10-04 DIAGNOSIS — E55.9 VITAMIN D DEFICIENCY: ICD-10-CM

## 2023-10-06 RX ORDER — ERGOCALCIFEROL 1.25 MG/1
50000 CAPSULE ORAL WEEKLY
Qty: 12 CAPSULE | Refills: 1 | Status: SHIPPED | OUTPATIENT
Start: 2023-10-06

## 2023-10-09 DIAGNOSIS — G89.29 CHRONIC NEUROPATHIC PAIN: ICD-10-CM

## 2023-10-09 DIAGNOSIS — M79.2 CHRONIC NEUROPATHIC PAIN: ICD-10-CM

## 2023-10-11 ENCOUNTER — ANESTHESIA (OUTPATIENT)
Dept: ANESTHESIOLOGY | Facility: HOSPITAL | Age: 68
End: 2023-10-11

## 2023-10-11 ENCOUNTER — ANESTHESIA EVENT (OUTPATIENT)
Dept: ANESTHESIOLOGY | Facility: HOSPITAL | Age: 68
End: 2023-10-11

## 2023-10-11 RX ORDER — DULOXETIN HYDROCHLORIDE 30 MG/1
CAPSULE, DELAYED RELEASE ORAL
Qty: 60 CAPSULE | Refills: 0 | Status: SHIPPED | OUTPATIENT
Start: 2023-10-11

## 2023-10-13 ENCOUNTER — PATIENT MESSAGE (OUTPATIENT)
Dept: GASTROENTEROLOGY | Facility: MEDICAL CENTER | Age: 68
End: 2023-10-13

## 2023-10-17 DIAGNOSIS — M79.2 CHRONIC NEUROPATHIC PAIN: ICD-10-CM

## 2023-10-17 DIAGNOSIS — G89.29 CHRONIC NEUROPATHIC PAIN: ICD-10-CM

## 2023-10-17 RX ORDER — DULOXETIN HYDROCHLORIDE 30 MG/1
CAPSULE, DELAYED RELEASE ORAL
Qty: 60 CAPSULE | Refills: 11 | OUTPATIENT
Start: 2023-10-17

## 2023-10-25 ENCOUNTER — PATIENT OUTREACH (OUTPATIENT)
Dept: FAMILY MEDICINE CLINIC | Facility: CLINIC | Age: 68
End: 2023-10-25

## 2023-10-26 RX ORDER — SODIUM CHLORIDE 9 MG/ML
125 INJECTION, SOLUTION INTRAVENOUS CONTINUOUS
Status: CANCELLED | OUTPATIENT
Start: 2023-10-26

## 2023-10-27 ENCOUNTER — ANESTHESIA (OUTPATIENT)
Dept: GASTROENTEROLOGY | Facility: MEDICAL CENTER | Age: 68
End: 2023-10-27

## 2023-10-27 ENCOUNTER — ANESTHESIA EVENT (OUTPATIENT)
Dept: GASTROENTEROLOGY | Facility: MEDICAL CENTER | Age: 68
End: 2023-10-27

## 2023-10-27 ENCOUNTER — HOSPITAL ENCOUNTER (OUTPATIENT)
Dept: GASTROENTEROLOGY | Facility: MEDICAL CENTER | Age: 68
Setting detail: OUTPATIENT SURGERY
Discharge: HOME | End: 2023-10-27
Payer: COMMERCIAL

## 2023-10-27 VITALS
BODY MASS INDEX: 23.16 KG/M2 | HEART RATE: 74 BPM | TEMPERATURE: 97.2 F | WEIGHT: 118 LBS | SYSTOLIC BLOOD PRESSURE: 130 MMHG | RESPIRATION RATE: 20 BRPM | DIASTOLIC BLOOD PRESSURE: 61 MMHG | OXYGEN SATURATION: 100 % | HEIGHT: 60 IN

## 2023-10-27 DIAGNOSIS — Z12.11 COLON CANCER SCREENING: ICD-10-CM

## 2023-10-27 LAB — GLUCOSE SERPL-MCNC: 112 MG/DL (ref 65–140)

## 2023-10-27 PROCEDURE — 82948 REAGENT STRIP/BLOOD GLUCOSE: CPT

## 2023-10-27 PROCEDURE — 45385 COLONOSCOPY W/LESION REMOVAL: CPT | Performed by: INTERNAL MEDICINE

## 2023-10-27 PROCEDURE — 88305 TISSUE EXAM BY PATHOLOGIST: CPT | Performed by: PATHOLOGY

## 2023-10-27 RX ORDER — SODIUM CHLORIDE 9 MG/ML
125 INJECTION, SOLUTION INTRAVENOUS CONTINUOUS
Status: DISCONTINUED | OUTPATIENT
Start: 2023-10-27 | End: 2023-10-31 | Stop reason: HOSPADM

## 2023-10-27 RX ORDER — LIDOCAINE HYDROCHLORIDE 20 MG/ML
INJECTION, SOLUTION EPIDURAL; INFILTRATION; INTRACAUDAL; PERINEURAL AS NEEDED
Status: DISCONTINUED | OUTPATIENT
Start: 2023-10-27 | End: 2023-10-27

## 2023-10-27 RX ORDER — PROPOFOL 10 MG/ML
INJECTION, EMULSION INTRAVENOUS AS NEEDED
Status: DISCONTINUED | OUTPATIENT
Start: 2023-10-27 | End: 2023-10-27

## 2023-10-27 RX ADMIN — LIDOCAINE HYDROCHLORIDE 50 MG: 20 INJECTION, SOLUTION EPIDURAL; INFILTRATION; INTRACAUDAL at 07:29

## 2023-10-27 RX ADMIN — PROPOFOL 50 MG: 10 INJECTION, EMULSION INTRAVENOUS at 07:37

## 2023-10-27 RX ADMIN — PROPOFOL 50 MG: 10 INJECTION, EMULSION INTRAVENOUS at 07:45

## 2023-10-27 RX ADMIN — SODIUM CHLORIDE 125 ML/HR: 0.9 INJECTION, SOLUTION INTRAVENOUS at 07:23

## 2023-10-27 RX ADMIN — PROPOFOL 50 MG: 10 INJECTION, EMULSION INTRAVENOUS at 07:41

## 2023-10-27 RX ADMIN — PROPOFOL 50 MG: 10 INJECTION, EMULSION INTRAVENOUS at 07:30

## 2023-10-27 RX ADMIN — PROPOFOL 50 MG: 10 INJECTION, EMULSION INTRAVENOUS at 07:33

## 2023-10-27 RX ADMIN — Medication 40 MG: at 07:33

## 2023-10-27 NOTE — ANESTHESIA PREPROCEDURE EVALUATION
Procedure:  COLONOSCOPY    Relevant Problems   CARDIO   (+) Pure hypercholesterolemia      ENDO   (+) Type 2 diabetes mellitus with other specified complication (HCC)      NEURO/PSYCH   (+) Anxiety   (+) Chronic neuropathic pain   (+) Diabetic neuropathy (HCC)      PULMONARY   (+) Asthma due to environmental allergies ((Childhood bronchial asthma))   (+) JESSICA (obstructive sleep apnea)   (+) Smoker        Physical Exam    Airway    Mallampati score: I  TM Distance: >3 FB  Neck ROM: full     Dental       Cardiovascular  Cardiovascular exam normal    Pulmonary  Pulmonary exam normal     Other Findings        Anesthesia Plan  ASA Score- 2     Anesthesia Type- IV sedation with anesthesia with ASA Monitors. Additional Monitors:     Airway Plan:            Plan Factors-    Chart reviewed. Patient summary reviewed. Induction- intravenous. Postoperative Plan-     Informed Consent- Anesthetic plan and risks discussed with patient.

## 2023-10-27 NOTE — H&P
H&P EXAM - Outpatient Endoscopy   Duy Camara 80 y.o. male MRN: [id number]    [address] [facility] ENDOSCOPY   Encounter: [id number]        History and Physical - [facility]  Duy Camara 80 y.o. male MRN: [id number]                  HPI: Duy Camara is a 80y.o. year old male who presents for colon cancer screening      REVIEW OF SYSTEMS: Per the HPI, and otherwise unremarkable. Historical Information   Past Medical History:   Diagnosis Date    Diabetes mellitus ([address])     Hyperlipidemia      Past Surgical History:   Procedure Laterality Date    FRACTURE SURGERY      right arm     Social History   Social History     Substance and Sexual Activity   Alcohol Use Not Currently     Social History     Substance and Sexual Activity   Drug Use Never     Social History     Tobacco Use   Smoking Status Every Day    Packs/day: 0.25    Types: Cigarettes   Smokeless Tobacco Never     No family history on file. Meds/Allergies     (Not in a hospital admission)      No Known Allergies    Objective     There were no vitals taken for this visit. PHYSICAL EXAM    Gen: NAD  CV: RRR  CHEST: Clear  ABD: soft, NT/ND  EXT: no edema      ASSESSMENT/PLAN:  This is a 80y.o. year old male here for colonoscopy, and he is stable and optimized for his procedure.

## 2023-10-27 NOTE — ANESTHESIA POSTPROCEDURE EVALUATION
Post-Op Assessment Note    CV Status:  Stable    Pain management: adequate     Mental Status:  Alert and awake   Hydration Status:  Euvolemic   PONV Controlled:  Controlled   Airway Patency:  Patent      Post Op Vitals Reviewed: Yes      Staff: Anesthesiologist         No notable events documented.     /61 (10/27/23 0805)    Temp     Pulse 74 (10/27/23 0805)   Resp 20 (10/27/23 0805)    SpO2 100 % (10/27/23 0805)

## 2023-11-03 PROCEDURE — 88305 TISSUE EXAM BY PATHOLOGIST: CPT | Performed by: PATHOLOGY

## 2023-11-06 DIAGNOSIS — M79.2 CHRONIC NEUROPATHIC PAIN: ICD-10-CM

## 2023-11-06 DIAGNOSIS — G89.29 CHRONIC NEUROPATHIC PAIN: ICD-10-CM

## 2023-11-06 RX ORDER — DULOXETIN HYDROCHLORIDE 30 MG/1
CAPSULE, DELAYED RELEASE ORAL
Qty: 60 CAPSULE | Refills: 0 | Status: SHIPPED | OUTPATIENT
Start: 2023-11-06

## 2023-11-14 ENCOUNTER — TELEPHONE (OUTPATIENT)
Age: 68
End: 2023-11-14

## 2023-11-14 NOTE — TELEPHONE ENCOUNTER
Patients GI provider:  Dr. Diana M Jaiyeola, MD       Number to return call: ( [phone]    Reason for call: Pt calling asking for tissues results. Was told to call office back.     Scheduled procedure/appointment date if applicable: Apt/procedure

## 2023-11-30 DIAGNOSIS — E11.49 OTHER DIABETIC NEUROLOGICAL COMPLICATION ASSOCIATED WITH TYPE 2 DIABETES MELLITUS (HCC): ICD-10-CM

## 2023-11-30 DIAGNOSIS — E11.69 TYPE 2 DIABETES MELLITUS WITH OTHER SPECIFIED COMPLICATION, UNSPECIFIED WHETHER LONG TERM INSULIN USE (HCC): ICD-10-CM

## 2023-11-30 RX ORDER — DULAGLUTIDE 0.75 MG/.5ML
INJECTION, SOLUTION SUBCUTANEOUS
Qty: 6 ML | Refills: 0 | Status: SHIPPED | OUTPATIENT
Start: 2023-11-30

## 2023-12-01 ENCOUNTER — PATIENT OUTREACH (OUTPATIENT)
Dept: FAMILY MEDICINE CLINIC | Facility: CLINIC | Age: 68
End: 2023-12-01

## 2023-12-01 NOTE — PROGRESS NOTES
I called the patient to follow up. He states he has been feeling well. He notes his blood sugars were elevated over the holiday. The patient reports the following BS: last 7 days 146; last 14 days 191; last 30 days 183; last 90 days 181. During this call his blood sugar was 123. The patient states he continues checking his feet daily and denies any skin breakdown. I reviewed the patient's DM meds with him. He states he is taking Trulicity and Bess but no longer than metformin. The patient states he is not getting relief from Cymbalta as he initially was; note sent to PCP. The patient stated he is taking it bid as ordered but admitted when he has breakthrough pain, he will take a third pill (he notes this is not a daily occurrence). I informed the patient he will be due for a PCP appointment. He agreed to schedule with clerical; note sent. I will continue to follow.

## 2023-12-07 ENCOUNTER — PATIENT OUTREACH (OUTPATIENT)
Dept: FAMILY MEDICINE CLINIC | Facility: CLINIC | Age: 68
End: 2023-12-07

## 2023-12-07 DIAGNOSIS — G89.29 CHRONIC NEUROPATHIC PAIN: ICD-10-CM

## 2023-12-07 DIAGNOSIS — M79.2 CHRONIC NEUROPATHIC PAIN: ICD-10-CM

## 2023-12-07 RX ORDER — DULOXETIN HYDROCHLORIDE 30 MG/1
CAPSULE, DELAYED RELEASE ORAL
Qty: 60 CAPSULE | Refills: 0 | Status: SHIPPED | OUTPATIENT
Start: 2023-12-07

## 2023-12-07 NOTE — PROGRESS NOTES
Even though the max dose of cymbalta is 120 mg per day, there is no evidence that doses greater than 60 mg per day confer additional benefit; higher doses are associated with a higher rate of side effects, so I don't think it would be a good idea to increase his dose.   When he gets his BW done (which he is due for and is ordered) we can see what his kidney function is an can restart Celebrex at a higher dose

## 2023-12-07 NOTE — PROGRESS NOTES
I called the patient to inform him PCP will not increasing Cymbalta but she recommended he have labs drawn to check kidney function. I will call the patient next month to be sure he has them drawn prior to his PCP appointment.

## 2023-12-07 NOTE — PROGRESS NOTES
No thanks, last HgA1c is only a little over 2 months, does not need to be repeated till at least 3 full  months

## 2023-12-15 ENCOUNTER — PATIENT OUTREACH (OUTPATIENT)
Dept: FAMILY MEDICINE CLINIC | Facility: CLINIC | Age: 68
End: 2023-12-15

## 2023-12-15 NOTE — PROGRESS NOTES
I received a call from the patient asking when his last PCP appointment was; info provided. He stated he needed this in order to receive [employer].

## 2024-01-03 ENCOUNTER — RA CDI HCC (OUTPATIENT)
Dept: OTHER | Facility: HOSPITAL | Age: 69
End: 2024-01-03

## 2024-01-03 NOTE — PROGRESS NOTES
HCC coding opportunities          Chart Reviewed number of suggestions sent to Provider: 2  E11.40  E11.65     Patients Insurance     Medicare Insurance: [employer]

## 2024-01-04 ENCOUNTER — PATIENT OUTREACH (OUTPATIENT)
Dept: FAMILY MEDICINE CLINIC | Facility: CLINIC | Age: 69
End: 2024-01-04

## 2024-01-04 NOTE — PROGRESS NOTES
I called the patient to remind him to have his labs drawn so the results are in to review at his appointment next week.  I will call him next week to remind him of this as well.

## 2024-01-05 ENCOUNTER — PATIENT OUTREACH (OUTPATIENT)
Dept: FAMILY MEDICINE CLINIC | Facility: CLINIC | Age: 69
End: 2024-01-05

## 2024-01-05 NOTE — PROGRESS NOTES
I received a message on my VM from the patient stating he needs to reschedule his 1/9 appointment; note sent to clerical.

## 2024-01-09 ENCOUNTER — TELEPHONE (OUTPATIENT)
Dept: FAMILY MEDICINE CLINIC | Facility: CLINIC | Age: 69
End: 2024-01-09

## 2024-01-09 NOTE — TELEPHONE ENCOUNTER
My name is Sherwin Ruiz. That's Sherwin Ruiz. My date of birth is [birth year]. My phone number is [phone]. I have an appointment for tomorrow that I won't be able to go, so I left a message on [employer], but I don't know if it got through because I got a text message from you guys. So that's basically it. Thank you Bye.      Per chart review, appointment was rescheduled for 1/12/24

## 2024-01-11 ENCOUNTER — PATIENT OUTREACH (OUTPATIENT)
Dept: FAMILY MEDICINE CLINIC | Facility: CLINIC | Age: 69
End: 2024-01-11

## 2024-01-11 NOTE — PROGRESS NOTES
I called the patient to remind him of his PCP appointment for tomorrow; he plans on attending and having his labs drawn prior to the appointment.  I will continue to follow.

## 2024-01-12 ENCOUNTER — OFFICE VISIT (OUTPATIENT)
Dept: FAMILY MEDICINE CLINIC | Facility: CLINIC | Age: 69
End: 2024-01-12

## 2024-01-12 ENCOUNTER — APPOINTMENT (OUTPATIENT)
Dept: LAB | Facility: CLINIC | Age: 69
End: 2024-01-12
Payer: COMMERCIAL

## 2024-01-12 VITALS
SYSTOLIC BLOOD PRESSURE: 120 MMHG | BODY MASS INDEX: 22.93 KG/M2 | OXYGEN SATURATION: 97 % | TEMPERATURE: 96.9 F | DIASTOLIC BLOOD PRESSURE: 60 MMHG | WEIGHT: 117.4 LBS | HEART RATE: 67 BPM

## 2024-01-12 DIAGNOSIS — E11.69 TYPE 2 DIABETES MELLITUS WITH OTHER SPECIFIED COMPLICATION, WITH LONG-TERM CURRENT USE OF INSULIN (HCC): ICD-10-CM

## 2024-01-12 DIAGNOSIS — Z11.59 NEED FOR HEPATITIS C SCREENING TEST: ICD-10-CM

## 2024-01-12 DIAGNOSIS — F41.9 ANXIETY: ICD-10-CM

## 2024-01-12 DIAGNOSIS — E11.49 OTHER DIABETIC NEUROLOGICAL COMPLICATION ASSOCIATED WITH TYPE 2 DIABETES MELLITUS (HCC): ICD-10-CM

## 2024-01-12 DIAGNOSIS — Z79.4 TYPE 2 DIABETES MELLITUS WITH OTHER SPECIFIED COMPLICATION, WITH LONG-TERM CURRENT USE OF INSULIN (HCC): ICD-10-CM

## 2024-01-12 DIAGNOSIS — M79.2 CHRONIC NEUROPATHIC PAIN: ICD-10-CM

## 2024-01-12 DIAGNOSIS — E55.9 VITAMIN D DEFICIENCY: ICD-10-CM

## 2024-01-12 DIAGNOSIS — E11.69 TYPE 2 DIABETES MELLITUS WITH OTHER SPECIFIED COMPLICATION, UNSPECIFIED WHETHER LONG TERM INSULIN USE (HCC): Primary | ICD-10-CM

## 2024-01-12 DIAGNOSIS — G89.29 CHRONIC NEUROPATHIC PAIN: ICD-10-CM

## 2024-01-12 LAB
ALBUMIN SERPL BCP-MCNC: 4.2 G/DL (ref 3.5–5)
ALP SERPL-CCNC: 75 U/L (ref 34–104)
ALT SERPL W P-5'-P-CCNC: 9 U/L (ref 7–52)
ANION GAP SERPL CALCULATED.3IONS-SCNC: 7 MMOL/L
AST SERPL W P-5'-P-CCNC: 11 U/L (ref 13–39)
BASOPHILS # BLD AUTO: 0.04 THOUSANDS/ÂΜL (ref 0–0.1)
BASOPHILS NFR BLD AUTO: 1 % (ref 0–1)
BILIRUB SERPL-MCNC: 0.54 MG/DL (ref 0.2–1)
BUN SERPL-MCNC: 10 MG/DL (ref 5–25)
CALCIUM SERPL-MCNC: 9.4 MG/DL (ref 8.4–10.2)
CHLORIDE SERPL-SCNC: 101 MMOL/L (ref 96–108)
CHOLEST SERPL-MCNC: 209 MG/DL
CO2 SERPL-SCNC: 29 MMOL/L (ref 21–32)
CREAT SERPL-MCNC: 0.87 MG/DL (ref 0.6–1.3)
CREAT UR-MCNC: 70.1 MG/DL
EOSINOPHIL # BLD AUTO: 0.15 THOUSAND/ÂΜL (ref 0–0.61)
EOSINOPHIL NFR BLD AUTO: 2 % (ref 0–6)
ERYTHROCYTE [DISTWIDTH] IN BLOOD BY AUTOMATED COUNT: 12.8 % (ref 11.6–15.1)
GFR SERPL CREATININE-BSD FRML MDRD: 89 ML/MIN/1.73SQ M
GLUCOSE P FAST SERPL-MCNC: 149 MG/DL (ref 65–99)
HCT VFR BLD AUTO: 47.8 % (ref 36.5–49.3)
HDLC SERPL-MCNC: 75 MG/DL
HGB BLD-MCNC: 15.9 G/DL (ref 12–17)
IMM GRANULOCYTES # BLD AUTO: 0.02 THOUSAND/UL (ref 0–0.2)
IMM GRANULOCYTES NFR BLD AUTO: 0 % (ref 0–2)
LDLC SERPL CALC-MCNC: 120 MG/DL (ref 0–100)
LYMPHOCYTES # BLD AUTO: 1.58 THOUSANDS/ÂΜL (ref 0.6–4.47)
LYMPHOCYTES NFR BLD AUTO: 24 % (ref 14–44)
MCH RBC QN AUTO: 29.3 PG (ref 26.8–34.3)
MCHC RBC AUTO-ENTMCNC: 33.3 G/DL (ref 31.4–37.4)
MCV RBC AUTO: 88 FL (ref 82–98)
MICROALBUMIN UR-MCNC: 37.2 MG/L
MICROALBUMIN/CREAT 24H UR: 53 MG/G CREATININE (ref 0–30)
MONOCYTES # BLD AUTO: 0.52 THOUSAND/ÂΜL (ref 0.17–1.22)
MONOCYTES NFR BLD AUTO: 8 % (ref 4–12)
NEUTROPHILS # BLD AUTO: 4.17 THOUSANDS/ÂΜL (ref 1.85–7.62)
NEUTS SEG NFR BLD AUTO: 65 % (ref 43–75)
NONHDLC SERPL-MCNC: 134 MG/DL
NRBC BLD AUTO-RTO: 0 /100 WBCS
PLATELET # BLD AUTO: 169 THOUSANDS/UL (ref 149–390)
PMV BLD AUTO: 11.5 FL (ref 8.9–12.7)
POTASSIUM SERPL-SCNC: 4.2 MMOL/L (ref 3.5–5.3)
PROT SERPL-MCNC: 6.8 G/DL (ref 6.4–8.4)
RBC # BLD AUTO: 5.43 MILLION/UL (ref 3.88–5.62)
SL AMB POCT HEMOGLOBIN AIC: 8 (ref ?–6.5)
SODIUM SERPL-SCNC: 137 MMOL/L (ref 135–147)
TRIGL SERPL-MCNC: 71 MG/DL
WBC # BLD AUTO: 6.48 THOUSAND/UL (ref 4.31–10.16)

## 2024-01-12 PROCEDURE — 82043 UR ALBUMIN QUANTITATIVE: CPT

## 2024-01-12 PROCEDURE — 84443 ASSAY THYROID STIM HORMONE: CPT

## 2024-01-12 PROCEDURE — 36415 COLL VENOUS BLD VENIPUNCTURE: CPT

## 2024-01-12 PROCEDURE — 85025 COMPLETE CBC W/AUTO DIFF WBC: CPT

## 2024-01-12 PROCEDURE — 83036 HEMOGLOBIN GLYCOSYLATED A1C: CPT | Performed by: FAMILY MEDICINE

## 2024-01-12 PROCEDURE — 86803 HEPATITIS C AB TEST: CPT

## 2024-01-12 PROCEDURE — 80053 COMPREHEN METABOLIC PANEL: CPT

## 2024-01-12 PROCEDURE — 82570 ASSAY OF URINE CREATININE: CPT

## 2024-01-12 PROCEDURE — 80061 LIPID PANEL: CPT

## 2024-01-12 PROCEDURE — 99214 OFFICE O/P EST MOD 30 MIN: CPT | Performed by: FAMILY MEDICINE

## 2024-01-12 RX ORDER — DULOXETIN HYDROCHLORIDE 30 MG/1
30 CAPSULE, DELAYED RELEASE ORAL 2 TIMES DAILY
Qty: 180 CAPSULE | Refills: 1 | Status: SHIPPED | OUTPATIENT
Start: 2024-01-12

## 2024-01-12 RX ORDER — ERGOCALCIFEROL 1.25 MG/1
50000 CAPSULE ORAL WEEKLY
Qty: 12 CAPSULE | Refills: 1 | Status: SHIPPED | OUTPATIENT
Start: 2024-01-12

## 2024-01-12 RX ORDER — TRAZODONE HYDROCHLORIDE 50 MG/1
50 TABLET ORAL
Qty: 90 TABLET | Refills: 4 | Status: SHIPPED | OUTPATIENT
Start: 2024-01-12

## 2024-01-12 RX ORDER — ATORVASTATIN CALCIUM 10 MG/1
10 TABLET, FILM COATED ORAL DAILY
Qty: 90 TABLET | Refills: 3 | Status: SHIPPED | OUTPATIENT
Start: 2024-01-12 | End: 2025-01-06

## 2024-01-12 NOTE — PROGRESS NOTES
Assessment/Plan:    Type 2 diabetes mellitus with other specified complication ([facility])    Lab Results   Component Value Date    HGBA1C 8.0 (A) 01/12/2024   HgA1c improved compared to prior  Patient concerned with significant weight loss however, last weight 10/2022 was 118 pounds currently 117  Await BW results  Continue Farxiga 10 mg daily and Metformin 1000 mg BID        Diagnoses and all orders for this visit:    Type 2 diabetes mellitus with other specified complication, unspecified whether long term insulin use ([facility])  -     POCT hemoglobin A1c  -     atorvastatin (LIPITOR) 10 mg tablet; Take 1 tablet (10 mg total) by mouth daily    Other diabetic neurological complication associated with type 2 diabetes mellitus (HCC)    Type 2 diabetes mellitus with other specified complication, with long-term current use of insulin ([facility])  Comments:  Stop Metformin due to diarrhea. Restart Trulicity. Start Farxiga 10 mg daily   Orders:  -     dapagliflozin (Farxiga) 10 MG tablet; Take 1 tablet (10 mg total) by mouth daily  -     metFORMIN (GLUCOPHAGE) 1000 MG tablet; Take 1 tablet (1,000 mg total) by mouth 2 (two) times a day with meals    Anxiety  -     traZODone (DESYREL) 50 mg tablet; Take 1 tablet (50 mg total) by mouth daily at bedtime    Chronic neuropathic pain  -     DULoxetine (CYMBALTA) 30 mg delayed release capsule; Take 1 capsule (30 mg total) by mouth 2 (two) times a day    Type 2 diabetes mellitus with other specified complication, with long-term current use of insulin ([facility])  -     dapagliflozin (Farxiga) 10 MG tablet; Take 1 tablet (10 mg total) by mouth daily  -     metFORMIN (GLUCOPHAGE) 1000 MG tablet; Take 1 tablet (1,000 mg total) by mouth 2 (two) times a day with meals    Vitamin D deficiency  -     ergocalciferol (VITAMIN D2) 50,000 units; Take 1 capsule (50,000 Units total) by mouth once a week          Subjective:      Patient ID: Sherwin Ruiz is a 68 y.o. male.    69 yo male with DM with neuropathy here  today for follow up  Patient's main concern today is non intentional weight loss, patient states his jeans and sweaters feel big on him. Patient does not weigh himself   Neuropathy pain improved with Cymbalta         The following portions of the patient's history were reviewed and updated as appropriate: He  has a past medical history of Diabetes mellitus (HCC) and Hyperlipidemia.  He   Patient Active Problem List    Diagnosis Date Noted   • COVID-19 virus infection 01/26/2023   • Vaccine refused by patient 11/29/2022   • Pure hypercholesterolemia 08/24/2022   • Muscle spasm 08/24/2022   • Functional diarrhea 04/28/2022   • Chronic neuropathic pain 04/28/2022   • Smoker 02/16/2022   • Erectile dysfunction due to diseases classified elsewhere 11/17/2021   • Bunion, right 11/17/2021   • Asthma due to environmental allergies 05/28/2021   • JESSICA (obstructive sleep apnea) 05/28/2021   • Type 2 diabetes mellitus with other specified complication ([facility]) 03/31/2021   • Diabetic neuropathy ([facility]) 03/31/2021   • History of heroin abuse ([facility]) 03/31/2021   • Anxiety 03/31/2021   • Chronic gastritis 03/31/2021     He  has a past surgical history that includes Fracture surgery.  His family history is not on file.  He  reports that he has been smoking cigarettes. He has never used smokeless tobacco. He reports that he does not currently use alcohol. He reports that he does not use drugs.  Current Outpatient Medications   Medication Sig Dispense Refill   • Alcohol Swabs (Alcohol Pads) 70 % PADS Use daily in the early morning 100 each 5   • atorvastatin (LIPITOR) 10 mg tablet Take 1 tablet (10 mg total) by mouth daily 90 tablet 3   • celecoxib (CeleBREX) 50 MG capsule 50 mg and 100 mg in the evvening 270 capsule 0   • cholecalciferol (VITAMIN D3) 1,000 units tablet Take 1 tablet (1,000 Units total) by mouth daily 90 tablet 2   • dapagliflozin (Farxiga) 10 MG tablet Take 1 tablet (10 mg total) by mouth daily 90 tablet 3   • DULoxetine  (CYMBALTA) 30 mg delayed release capsule Take 1 capsule (30 mg total) by mouth 2 (two) times a day 180 capsule 1   • ergocalciferol (VITAMIN D2) 50,000 units Take 1 capsule (50,000 Units total) by mouth once a week 12 capsule 1   • glucose monitoring kit (FREESTYLE) monitoring kit Use 1 each daily in the early morning 1 each 0   • Lancets (freestyle) lancets Use as instructed 100 each 5   • metFORMIN (GLUCOPHAGE) 1000 MG tablet Take 1 tablet (1,000 mg total) by mouth 2 (two) times a day with meals 180 tablet 3   • traZODone (DESYREL) 50 mg tablet Take 1 tablet (50 mg total) by mouth daily at bedtime 90 tablet 4   • gabapentin (NEURONTIN) 800 mg tablet Take 1 tablet (800 mg total) by mouth 4 (four) times a day as needed (increased pain) May take an extra dose at bedtime (Patient not taking: Reported on 7/26/2023) 100 tablet 5     No current facility-administered medications for this visit.     Current Outpatient Medications on File Prior to Visit   Medication Sig   • Alcohol Swabs (Alcohol Pads) 70 % PADS Use daily in the early morning   • celecoxib (CeleBREX) 50 MG capsule 50 mg and 100 mg in the evvening   • cholecalciferol (VITAMIN D3) 1,000 units tablet Take 1 tablet (1,000 Units total) by mouth daily   • glucose monitoring kit (FREESTYLE) monitoring kit Use 1 each daily in the early morning   • Lancets (freestyle) lancets Use as instructed   • [DISCONTINUED] dapagliflozin (Farxiga) 10 MG tablet Take 1 tablet (10 mg total) by mouth daily   • [DISCONTINUED] DULoxetine (CYMBALTA) 30 mg delayed release capsule TAKE ONE CAPSULE BY MOUTH TWICE DAILY @9am & 5pm   • [DISCONTINUED] ergocalciferol (VITAMIN D2) 50,000 units take 1 capsule by mouth every week   • [DISCONTINUED] metFORMIN (GLUCOPHAGE) 1000 MG tablet Take 1 tablet (1,000 mg total) by mouth 2 (two) times a day with meals   • [DISCONTINUED] traZODone (DESYREL) 50 mg tablet TAKE ONE TABLET BY MOUTH DAILY AT 9PM AT BEDTIME   • [DISCONTINUED] Trulicity 0.75  MG/0.5ML injection INJECT 0.5ML (0.75MG) SUBCUTANEOUSLY EVERY 7 DAYS IN THE ABDOMEN, THIGHS, OR UPPER ARM   • gabapentin (NEURONTIN) 800 mg tablet Take 1 tablet (800 mg total) by mouth 4 (four) times a day as needed (increased pain) May take an extra dose at bedtime (Patient not taking: Reported on 7/26/2023)   • [DISCONTINUED] Aspirin Low Dose 81 MG EC tablet TAKE ONE TABLET BY MOUTH DAILY AT 9AM (Patient not taking: Reported on 1/12/2024)   • [DISCONTINUED] atorvastatin (LIPITOR) 10 mg tablet Take 1 tablet (10 mg total) by mouth daily   • [DISCONTINUED] Diclofenac Sodium (VOLTAREN) 1 % Apply 2 g topically 4 (four) times a day (Patient not taking: Reported on 1/18/2023)   • [DISCONTINUED] gabapentin (NEURONTIN) 100 mg capsule take 1 capsule by mouth three times a day (Patient not taking: Reported on 7/26/2023)   • [DISCONTINUED] glucose blood (FREESTYLE LITE) test strip Check blood sugar 3 times a day   • [DISCONTINUED] Insulin Pen Needle 32G X 4 MM MISC Use daily at bedtime     No current facility-administered medications on file prior to visit.     He has No Known Allergies..    Review of Systems   Constitutional:  Positive for unexpected weight change.   Musculoskeletal:  Positive for arthralgias.   All other systems reviewed and are negative.        Objective:      /60 (BP Location: Right arm, Patient Position: Sitting, Cuff Size: Standard)   Pulse 67   Temp (!) 96.9 °F (36.1 °C) (Temporal)   Wt 53.3 kg (117 lb 6.4 oz)   SpO2 97%   BMI 22.93 kg/m²          Physical Exam  Vitals and nursing note reviewed.   Constitutional:       Appearance: He is well-developed.   HENT:      Head: Normocephalic.      Right Ear: External ear normal.      Left Ear: External ear normal.      Nose: Nose normal.   Eyes:      Conjunctiva/sclera: Conjunctivae normal.      Pupils: Pupils are equal, round, and reactive to light.   Neck:      Thyroid: No thyromegaly.   Cardiovascular:      Rate and Rhythm: Normal rate and  regular rhythm.      Heart sounds: Normal heart sounds.   Pulmonary:      Effort: Pulmonary effort is normal.      Breath sounds: Normal breath sounds.   Abdominal:      Palpations: Abdomen is soft.      Tenderness: There is no abdominal tenderness. There is no guarding or rebound.   Musculoskeletal:         General: Normal range of motion.      Cervical back: Normal range of motion and neck supple.   Skin:     General: Skin is dry.   Neurological:      Mental Status: He is alert and oriented to person, place, and time.      Deep Tendon Reflexes: Reflexes are normal and symmetric.

## 2024-01-12 NOTE — ASSESSMENT & PLAN NOTE
Lab Results   Component Value Date    HGBA1C 8.0 (A) 01/12/2024   HgA1c improved compared to prior  Patient concerned with significant weight loss however, last weight 10/2022 was 118 pounds currently 117  Await BW results  Continue Farxiga 10 mg daily and Metformin 1000 mg BID

## 2024-01-13 LAB
HCV AB SER QL: NORMAL
TSH SERPL DL<=0.05 MIU/L-ACNC: 1.74 UIU/ML (ref 0.45–4.5)

## 2024-02-15 ENCOUNTER — PATIENT OUTREACH (OUTPATIENT)
Dept: FAMILY MEDICINE CLINIC | Facility: CLINIC | Age: 69
End: 2024-02-15

## 2024-02-15 DIAGNOSIS — E11.69 TYPE 2 DIABETES MELLITUS WITH OTHER SPECIFIED COMPLICATION, UNSPECIFIED WHETHER LONG TERM INSULIN USE (HCC): Primary | ICD-10-CM

## 2024-02-15 NOTE — PROGRESS NOTES
I called the patient to follow up.  He states he has been feeling well.  He notes he has been very active with his [employer] and the community, feeding the poor, etc.  I commended him on this and asked him to continue staying active.    The patient ran out of supplies to check his blood sugar and had no readings for me.  He does not use his glucometer.  The patient stated he will be ordering his supplies and should receive them in 2 days.  The patient confirmed he is taking farxiga but has not received Trulicity; note sent to PCP.  The patient states he is checking his feet daily and denies any skin breakdown.    The patient will call with any questions or concerns.  Note sent to clerical to schedule PCP appointment.

## 2024-02-19 ENCOUNTER — PATIENT OUTREACH (OUTPATIENT)
Dept: FAMILY MEDICINE CLINIC | Facility: CLINIC | Age: 69
End: 2024-02-19

## 2024-02-19 NOTE — PROGRESS NOTES
I received a jackie from the patient asking when his last PCP appointment was. I informed him the appointment was 1/12/24.  I will continue to follow.

## 2024-03-12 ENCOUNTER — PATIENT OUTREACH (OUTPATIENT)
Dept: FAMILY MEDICINE CLINIC | Facility: CLINIC | Age: 69
End: 2024-03-12

## 2024-03-12 NOTE — PROGRESS NOTES
I called the patient to remind him of his PCP appointment for tomorrow at 1000; he plans on attending.  I will continue to follow.

## 2024-03-13 ENCOUNTER — OFFICE VISIT (OUTPATIENT)
Dept: FAMILY MEDICINE CLINIC | Facility: CLINIC | Age: 69
End: 2024-03-13

## 2024-03-13 VITALS
RESPIRATION RATE: 18 BRPM | BODY MASS INDEX: 22.97 KG/M2 | SYSTOLIC BLOOD PRESSURE: 116 MMHG | TEMPERATURE: 97.5 F | WEIGHT: 117 LBS | HEIGHT: 60 IN | HEART RATE: 110 BPM | OXYGEN SATURATION: 95 % | DIASTOLIC BLOOD PRESSURE: 68 MMHG

## 2024-03-13 DIAGNOSIS — E78.00 PURE HYPERCHOLESTEROLEMIA: ICD-10-CM

## 2024-03-13 DIAGNOSIS — Z28.21 IMMUNIZATION REFUSED: ICD-10-CM

## 2024-03-13 DIAGNOSIS — M79.10 MYALGIA DUE TO STATIN: ICD-10-CM

## 2024-03-13 DIAGNOSIS — E11.69 TYPE 2 DIABETES MELLITUS WITH OTHER SPECIFIED COMPLICATION, UNSPECIFIED WHETHER LONG TERM INSULIN USE (HCC): Primary | ICD-10-CM

## 2024-03-13 DIAGNOSIS — T46.6X5A MYALGIA DUE TO STATIN: ICD-10-CM

## 2024-03-13 PROCEDURE — 99214 OFFICE O/P EST MOD 30 MIN: CPT | Performed by: FAMILY MEDICINE

## 2024-03-13 PROCEDURE — G2211 COMPLEX E/M VISIT ADD ON: HCPCS | Performed by: FAMILY MEDICINE

## 2024-03-13 RX ORDER — EZETIMIBE 10 MG/1
10 TABLET ORAL DAILY
Qty: 90 TABLET | Refills: 1 | Status: SHIPPED | OUTPATIENT
Start: 2024-03-13

## 2024-03-13 NOTE — ASSESSMENT & PLAN NOTE
Lab Results   Component Value Date    HGBA1C 8.0 (A) 01/12/2024   Continue Farxiga and Metformin  Reviewed low carb diet

## 2024-03-13 NOTE — PROGRESS NOTES
Assessment/Plan:    Type 2 diabetes mellitus with other specified complication (HCC)    Lab Results   Component Value Date    HGBA1C 8.0 (A) 01/12/2024   Continue Farxiga and Metformin  Reviewed low carb diet        Diagnoses and all orders for this visit:    Type 2 diabetes mellitus with other specified complication, unspecified whether long term insulin use (HCC)    Pure hypercholesterolemia  -     ezetimibe (ZETIA) 10 mg tablet; Take 1 tablet (10 mg total) by mouth daily    Myalgia due to statin  -     CK; Future    Immunization refused  Comments:  Age and season apropriate vaccines refused despite counseling          Subjective:      Patient ID: Sherwin Ruiz is a 68 y.o. male.    69 yo male with DM  Uses CGM but as not changed his sensor  Complains of feeling tired and achy, states his legs and arms hurt, worse when standing for long periods such as when doing the dishes         The following portions of the patient's history were reviewed and updated as appropriate: He  has a past medical history of Diabetes mellitus (HCC) and Hyperlipidemia.  He   Patient Active Problem List    Diagnosis Date Noted   • COVID-19 virus infection 01/26/2023   • Vaccine refused by patient 11/29/2022   • Pure hypercholesterolemia 08/24/2022   • Muscle spasm 08/24/2022   • Functional diarrhea 04/28/2022   • Chronic neuropathic pain 04/28/2022   • Smoker 02/16/2022   • Erectile dysfunction due to diseases classified elsewhere 11/17/2021   • Bunion, right 11/17/2021   • Asthma due to environmental allergies 05/28/2021   • JESSICA (obstructive sleep apnea) 05/28/2021   • Type 2 diabetes mellitus with other specified complication (HCC) 03/31/2021   • Diabetic neuropathy (HCC) 03/31/2021   • History of heroin abuse (HCC) 03/31/2021   • Anxiety 03/31/2021   • Chronic gastritis 03/31/2021     He  has a past surgical history that includes Fracture surgery.  His family history is not on file.  He  reports that he has been smoking cigarettes. He  has never used smokeless tobacco. He reports that he does not currently use alcohol. He reports that he does not use drugs.  Current Outpatient Medications   Medication Sig Dispense Refill   • ezetimibe (ZETIA) 10 mg tablet Take 1 tablet (10 mg total) by mouth daily 90 tablet 1   • Alcohol Swabs (Alcohol Pads) 70 % PADS Use daily in the early morning 100 each 5   • celecoxib (CeleBREX) 50 MG capsule 50 mg and 100 mg in the evvening 270 capsule 0   • cholecalciferol (VITAMIN D3) 1,000 units tablet Take 1 tablet (1,000 Units total) by mouth daily 90 tablet 2   • dapagliflozin (Farxiga) 10 MG tablet Take 1 tablet (10 mg total) by mouth daily 90 tablet 3   • dulaglutide (Trulicity) 1.5 MG/0.5ML injection Inject 0.5 mL (1.5 mg total) under the skin every 7 days 6 mL 1   • DULoxetine (CYMBALTA) 30 mg delayed release capsule Take 1 capsule (30 mg total) by mouth 2 (two) times a day 180 capsule 1   • ergocalciferol (VITAMIN D2) 50,000 units Take 1 capsule (50,000 Units total) by mouth once a week 12 capsule 1   • gabapentin (NEURONTIN) 800 mg tablet Take 1 tablet (800 mg total) by mouth 4 (four) times a day as needed (increased pain) May take an extra dose at bedtime (Patient not taking: Reported on 7/26/2023) 100 tablet 5   • glucose monitoring kit (FREESTYLE) monitoring kit Use 1 each daily in the early morning 1 each 0   • Lancets (freestyle) lancets Use as instructed 100 each 5   • metFORMIN (GLUCOPHAGE) 1000 MG tablet Take 1 tablet (1,000 mg total) by mouth 2 (two) times a day with meals 180 tablet 3   • traZODone (DESYREL) 50 mg tablet Take 1 tablet (50 mg total) by mouth daily at bedtime 90 tablet 4     No current facility-administered medications for this visit.     Current Outpatient Medications on File Prior to Visit   Medication Sig   • Alcohol Swabs (Alcohol Pads) 70 % PADS Use daily in the early morning   • celecoxib (CeleBREX) 50 MG capsule 50 mg and 100 mg in the evvening   • cholecalciferol (VITAMIN D3)  1,000 units tablet Take 1 tablet (1,000 Units total) by mouth daily   • dapagliflozin (Farxiga) 10 MG tablet Take 1 tablet (10 mg total) by mouth daily   • dulaglutide (Trulicity) 1.5 MG/0.5ML injection Inject 0.5 mL (1.5 mg total) under the skin every 7 days   • DULoxetine (CYMBALTA) 30 mg delayed release capsule Take 1 capsule (30 mg total) by mouth 2 (two) times a day   • ergocalciferol (VITAMIN D2) 50,000 units Take 1 capsule (50,000 Units total) by mouth once a week   • gabapentin (NEURONTIN) 800 mg tablet Take 1 tablet (800 mg total) by mouth 4 (four) times a day as needed (increased pain) May take an extra dose at bedtime (Patient not taking: Reported on 7/26/2023)   • glucose monitoring kit (FREESTYLE) monitoring kit Use 1 each daily in the early morning   • Lancets (freestyle) lancets Use as instructed   • metFORMIN (GLUCOPHAGE) 1000 MG tablet Take 1 tablet (1,000 mg total) by mouth 2 (two) times a day with meals   • traZODone (DESYREL) 50 mg tablet Take 1 tablet (50 mg total) by mouth daily at bedtime   • [DISCONTINUED] atorvastatin (LIPITOR) 10 mg tablet Take 1 tablet (10 mg total) by mouth daily     No current facility-administered medications on file prior to visit.     He has No Known Allergies..    Review of Systems   Musculoskeletal:  Positive for myalgias.   All other systems reviewed and are negative.        Objective:      /68 (BP Location: Left arm, Patient Position: Sitting, Cuff Size: Standard)   Pulse (!) 110   Temp 97.5 °F (36.4 °C) (Temporal)   Resp 18   Ht 5' (1.524 m)   Wt 53.1 kg (117 lb)   SpO2 95%   BMI 22.85 kg/m²          Physical Exam  Vitals and nursing note reviewed.   Constitutional:       Appearance: He is well-developed.   HENT:      Head: Normocephalic.      Right Ear: External ear normal.      Left Ear: External ear normal.      Nose: Nose normal.   Eyes:      Conjunctiva/sclera: Conjunctivae normal.      Pupils: Pupils are equal, round, and reactive to light.    Neck:      Thyroid: No thyromegaly.   Cardiovascular:      Rate and Rhythm: Normal rate and regular rhythm.      Heart sounds: Normal heart sounds.   Pulmonary:      Effort: Pulmonary effort is normal.      Breath sounds: Normal breath sounds.   Abdominal:      Palpations: Abdomen is soft.      Tenderness: There is no abdominal tenderness. There is no guarding or rebound.   Musculoskeletal:         General: Normal range of motion.      Cervical back: Normal range of motion and neck supple.   Skin:     General: Skin is dry.   Neurological:      Mental Status: He is alert and oriented to person, place, and time.      Deep Tendon Reflexes: Reflexes are normal and symmetric.

## 2024-04-18 ENCOUNTER — PATIENT OUTREACH (OUTPATIENT)
Dept: FAMILY MEDICINE CLINIC | Facility: CLINIC | Age: 69
End: 2024-04-18

## 2024-04-18 NOTE — PROGRESS NOTES
I called the patient to follow up.  He states he has been feeling well.   He is waiting for his sensors to arrive and has not been checking his blood sugars with his glucometer.  I asked that he be strict with his diet until the sensors arrive and he states he is.  The patient notes he continues checking his feet daily and denies any skin breakdown.   The patient states he is taking Farxiga but Trulicity has been on backorder and he has not received it in months; note sent to provider.    The patient is due for an appointment in June; note sent to clerical.  I reminded the patient he is due for his yearly eye and foot exams.    I will continue to follow.

## 2024-05-14 ENCOUNTER — TELEPHONE (OUTPATIENT)
Dept: FAMILY MEDICINE CLINIC | Facility: CLINIC | Age: 69
End: 2024-05-14

## 2024-05-14 NOTE — TELEPHONE ENCOUNTER
PCP SIGNATURE NEEDED FOR [facility]  FORM RECEIVED VIA FAX AND PLACED IN PCP FOLDER TO BE DELIVERED AT ASSIGNED TIMES.      Update information of New Provider.      Jelani Jackson this form is being forwarded to you due pt being Dr. Bledsoe pt. Pt has f/u appt with you scheduled for 6/17/24.Copy of form will be placed in your bin during AM or PM rounding.

## 2024-05-15 ENCOUNTER — TELEPHONE (OUTPATIENT)
Dept: FAMILY MEDICINE CLINIC | Facility: CLINIC | Age: 69
End: 2024-05-15

## 2024-05-23 ENCOUNTER — PATIENT OUTREACH (OUTPATIENT)
Dept: FAMILY MEDICINE CLINIC | Facility: CLINIC | Age: 69
End: 2024-05-23

## 2024-05-24 ENCOUNTER — PATIENT OUTREACH (OUTPATIENT)
Dept: FAMILY MEDICINE CLINIC | Facility: CLINIC | Age: 69
End: 2024-05-24

## 2024-05-24 NOTE — PROGRESS NOTES
I called the patient to follow up.  He states he has been feeling well.  He has been staying busy helping in the community at [facility].    The patient reports his fasting blood sugar today was 83 and notes his average for the past 30 days is 162.  He notes he is taking Farxiga but has been without Trulicity for months as it was out of stock.  Call placed to the patient's pharmacy. I was told Trulicity is back in stock.  They will call the patient to discuss how he wants the med shipped.  The patient states he is checking his feet daily and denies any skin breakdown.    I will call the patient in a few weeks to remind him of his upcoming PCP appointment.

## 2024-05-29 ENCOUNTER — TELEPHONE (OUTPATIENT)
Dept: FAMILY MEDICINE CLINIC | Facility: CLINIC | Age: 69
End: 2024-05-29

## 2024-05-29 NOTE — TELEPHONE ENCOUNTER
PCP SIGNATURE NEEDED FOR [facility]  FORM RECEIVED VIA FAX AND PLACED IN PCP FOLDER TO BE DELIVERED AT ASSIGNED TIMES.    [facility] Physician's Order Starts 6/22      Hi Dr Jackson I routed This encounter to you, because this patient has an caroline Schedule for 6/17 with you.

## 2024-06-05 NOTE — TELEPHONE ENCOUNTER
FAXED ON 06/05/24 TO [facility]  at [phone]. FAX CONFIRMATION RECEIVED.

## 2024-06-10 DIAGNOSIS — E55.9 VITAMIN D DEFICIENCY: ICD-10-CM

## 2024-06-10 RX ORDER — ERGOCALCIFEROL 1.25 MG/1
50000 CAPSULE ORAL WEEKLY
Qty: 12 CAPSULE | Refills: 0 | Status: SHIPPED | OUTPATIENT
Start: 2024-06-10

## 2024-06-14 ENCOUNTER — OFFICE VISIT (OUTPATIENT)
Dept: FAMILY MEDICINE CLINIC | Facility: CLINIC | Age: 69
End: 2024-06-14

## 2024-06-14 ENCOUNTER — PATIENT OUTREACH (OUTPATIENT)
Dept: FAMILY MEDICINE CLINIC | Facility: CLINIC | Age: 69
End: 2024-06-14

## 2024-06-14 VITALS
HEART RATE: 94 BPM | HEIGHT: 60 IN | BODY MASS INDEX: 22.78 KG/M2 | SYSTOLIC BLOOD PRESSURE: 137 MMHG | WEIGHT: 116 LBS | TEMPERATURE: 98 F | RESPIRATION RATE: 16 BRPM | DIASTOLIC BLOOD PRESSURE: 76 MMHG | OXYGEN SATURATION: 94 %

## 2024-06-14 DIAGNOSIS — G89.29 CHRONIC NEUROPATHIC PAIN: ICD-10-CM

## 2024-06-14 DIAGNOSIS — M25.512 CHRONIC LEFT SHOULDER PAIN: ICD-10-CM

## 2024-06-14 DIAGNOSIS — M79.2 CHRONIC NEUROPATHIC PAIN: ICD-10-CM

## 2024-06-14 DIAGNOSIS — M25.50 ARTHRALGIA, UNSPECIFIED JOINT: ICD-10-CM

## 2024-06-14 DIAGNOSIS — E11.69 TYPE 2 DIABETES MELLITUS WITH OTHER SPECIFIED COMPLICATION, UNSPECIFIED WHETHER LONG TERM INSULIN USE (HCC): Primary | ICD-10-CM

## 2024-06-14 DIAGNOSIS — E78.00 PURE HYPERCHOLESTEROLEMIA: ICD-10-CM

## 2024-06-14 DIAGNOSIS — G89.29 CHRONIC LEFT SHOULDER PAIN: ICD-10-CM

## 2024-06-14 DIAGNOSIS — E55.9 VITAMIN D DEFICIENCY: ICD-10-CM

## 2024-06-14 PROBLEM — J45.909 ASTHMA DUE TO ENVIRONMENTAL ALLERGIES: Status: RESOLVED | Noted: 2021-05-28 | Resolved: 2024-06-14

## 2024-06-14 PROBLEM — F11.11 HISTORY OF HEROIN ABUSE (HCC): Status: RESOLVED | Noted: 2021-03-31 | Resolved: 2024-06-14

## 2024-06-14 PROBLEM — Z28.20 VACCINE REFUSED BY PATIENT: Status: RESOLVED | Noted: 2022-11-29 | Resolved: 2024-06-14

## 2024-06-14 PROBLEM — M62.838 MUSCLE SPASM: Status: RESOLVED | Noted: 2022-08-24 | Resolved: 2024-06-14

## 2024-06-14 PROBLEM — K59.1 FUNCTIONAL DIARRHEA: Status: RESOLVED | Noted: 2022-04-28 | Resolved: 2024-06-14

## 2024-06-14 PROBLEM — U07.1 COVID-19 VIRUS INFECTION: Status: RESOLVED | Noted: 2023-01-26 | Resolved: 2024-06-14

## 2024-06-14 LAB — SL AMB POCT HEMOGLOBIN AIC: 8.3 (ref ?–6.5)

## 2024-06-14 PROCEDURE — 83036 HEMOGLOBIN GLYCOSYLATED A1C: CPT | Performed by: FAMILY MEDICINE

## 2024-06-14 PROCEDURE — 99214 OFFICE O/P EST MOD 30 MIN: CPT | Performed by: FAMILY MEDICINE

## 2024-06-14 RX ORDER — CELECOXIB 50 MG/1
CAPSULE ORAL
Qty: 270 CAPSULE | Refills: 0 | Status: SHIPPED | OUTPATIENT
Start: 2024-06-14 | End: 2024-06-25

## 2024-06-14 RX ORDER — DULOXETIN HYDROCHLORIDE 30 MG/1
30 CAPSULE, DELAYED RELEASE ORAL 2 TIMES DAILY
Qty: 180 CAPSULE | Refills: 1 | Status: SHIPPED | OUTPATIENT
Start: 2024-06-14

## 2024-06-14 RX ORDER — EZETIMIBE 10 MG/1
10 TABLET ORAL DAILY
Qty: 90 TABLET | Refills: 1 | Status: SHIPPED | OUTPATIENT
Start: 2024-06-14
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
## 2024-06-14 NOTE — ASSESSMENT & PLAN NOTE
Lab Results   Component Value Date/Time    CHOLESTEROL 209 (H) 01/12/2024 10:07 AM    TRIG 71 01/12/2024 10:07 AM    HDL 75 01/12/2024 10:07 AM    LDLCALC 120 (H) 01/12/2024 10:07 AM       Continue Zetia 10 mg daily  Low Fat Diet, regular Exercise

## 2024-06-14 NOTE — PROGRESS NOTES
I called the patient to remind him of his PCP appointment for this morning at 1120; he plans on attending.    I will continue to follow.

## 2024-06-18 ENCOUNTER — TELEPHONE (OUTPATIENT)
Dept: FAMILY MEDICINE CLINIC | Facility: CLINIC | Age: 69
End: 2024-06-18

## 2024-06-18 NOTE — TELEPHONE ENCOUNTER
PCP SIGNATURE NEEDED FOR CoverMyMeds Prior Authorization/Celecoxib 50MG Capsules  FORM RECEIVED VIA FAX AND PLACED IN PCP FOLDER TO BE DELIVERED AT ASSIGNED TIMES.    [employer]- Request For Medicare Prescription Drug Coverage Determination   no

## 2024-06-25 ENCOUNTER — TELEPHONE (OUTPATIENT)
Dept: FAMILY MEDICINE CLINIC | Facility: CLINIC | Age: 69
End: 2024-06-25

## 2024-06-25 ENCOUNTER — CLINICAL SUPPORT (OUTPATIENT)
Dept: FAMILY MEDICINE CLINIC | Facility: CLINIC | Age: 69
End: 2024-06-25

## 2024-06-25 DIAGNOSIS — E11.69 TYPE 2 DIABETES MELLITUS WITH OTHER SPECIFIED COMPLICATION, UNSPECIFIED WHETHER LONG TERM INSULIN USE (HCC): Primary | ICD-10-CM

## 2024-06-25 LAB
DME PARACHUTE DELIVERY DATE REQUESTED: NORMAL
DME PARACHUTE DELIVERY NOTE: NORMAL
DME PARACHUTE ITEM DESCRIPTION: NORMAL
DME PARACHUTE ORDER STATUS: NORMAL
DME PARACHUTE SUPPLIER NAME: NORMAL
DME PARACHUTE SUPPLIER PHONE: NORMAL

## 2024-06-25 RX ORDER — PRAVASTATIN SODIUM 10 MG
10 TABLET ORAL DAILY
Qty: 90 TABLET | Refills: 1 | Status: SHIPPED | OUTPATIENT
Start: 2024-06-25

## 2024-06-25 RX ORDER — ROSUVASTATIN CALCIUM 10 MG/1
10 TABLET, COATED ORAL DAILY
Qty: 30 TABLET | Refills: 2 | Status: CANCELLED | OUTPATIENT
Start: 2024-06-25

## 2024-06-25 NOTE — PROGRESS NOTES
[facility]  [address], [address]  [place] [id number] [phone]  Clinical Pharmacy Consultation    Encounter provider: Jonnathan Tolliver, Pharmacist    This patient was referred by PCP for pharmacy consultation. Thank you for the referral. Sending this note to PCP.    Assessment/Plan  1. Type 2 diabetes mellitus with other specified complication, unspecified whether long term insulin use (HCC)  -     Ambulatory referral to clinical pharmacy      Type 2 diabetes:    Current regimen:   Farxiga 10 mg daily   Trulicity 1.5 mg weekly   Visit focused on t2dm  Blood glucose marginally controlled   Most recent A1c below goal of <7%.   Currently asymptomatic and fairly adherant  with treatment plan  Had pill packs with him today   Medications previously tried: metformin - GI probs   Screening:  - Microalb:Cr is  above goal  --- candidate for RAASI - discuss this next visit     Counseled lifestyle improvement, to follow a diabetic diet, and decrease high carbohydrates snacks.   Counseled on medication compliance and exercise as tolerated.  [facility] medical order sent for reyna 3 upgrade per pt request    Trulicity just restarted likely A1c susana improve back < 8   No need to change meds today   F/u for reyna downlaod then can d/c     New DM regimen:   Farxiga 10 mg daily   Trulicity 1.5 mg weekly   Just restarted   Complicating: doesn't tolerate metformin   FUTURE: titrate glp1 if needed   RTC in in 4 weeks for assessment of glucose readings/medication review    Hyperlipidemia without clinical ASCVD event:  The 10-year ASCVD risk score (Juan TORRES, et al., 2019) is: 31.7%    Values used to calculate the score:      Age: 68 years      Sex: Male      Is Non- : No      Diabetic: Yes      Tobacco smoker: Yes      Systolic Blood Pressure: 137 mmHg      Is BP treated: No      HDL Cholesterol: 75 mg/dL      Total Cholesterol: 209 mg/dL   Most  recent lipid panel is  elevated above goal  2018 ACC/AHA blood cholesterol guidelines recommends moderate-intensity statin. Patient currently not on statin  Try pravastatin as pt had myalgia from atorv. Reviewed mechanism of action, adverse effects, benefits, risks, administration, frequency, and likely duration of therapy of pravastaitn.  Discussed potential for muscle pain etc    HTN:   BP goal: <130/80 mmHg   In-office BP below goal, HR acceptable.   129/80 HR 63    Other: Dexa consider - tremor today - nueropathy complaint brought up by pt - note sent to pcp, please see      FYI to PCP:   Will pend Rx for PCP signature/concurrence  Orders placed under CPA      Medication list was updated and discussed with patient.   Discontinued medications: none   Clinically significant drug interactions identified: none   Side effects from medication(s) reported: none    The patient communicates understanding of the treatment plan.    M*Modal Dragon software was used to dictate this note. It may contain errors with dictating incorrect words or incorrect spelling. Please contact the provider directly with any questions. Thank you for your understanding.    Subjective  Sherwin Ruiz is a 68 y.o. male who presents in-person for initial clinical pharmacist consult.  Reason for visit: diabetes.   No particular questions or concerns.  HPI   Familiar with dm care, follows with complex care, very kind     Social History     Tobacco Use   Smoking Status Every Day    Current packs/day: 0.25    Types: Cigarettes   Smokeless Tobacco Never   Tobacco Comments    Smokes about 5 cig/day        No Known Allergies       Current Outpatient Medications:     Alcohol Swabs (Alcohol Pads) 70 % PADS, Use daily in the early morning, Disp: 100 each, Rfl: 5    celecoxib (CeleBREX) 50 MG capsule, 50 mg and 100 mg in the evvening, Disp: 270 capsule, Rfl: 0    cholecalciferol (VITAMIN D3) 1,000 units tablet, Take 1 tablet (1,000 Units total) by mouth  daily, Disp: 90 tablet, Rfl: 2    dapagliflozin (Farxiga) 10 MG tablet, Take 1 tablet (10 mg total) by mouth daily, Disp: 90 tablet, Rfl: 3    dulaglutide (Trulicity) 1.5 MG/0.5ML injection, Inject 0.5 mL (1.5 mg total) under the skin every 7 days, Disp: 6 mL, Rfl: 1    DULoxetine (CYMBALTA) 30 mg delayed release capsule, Take 1 capsule (30 mg total) by mouth 2 (two) times a day, Disp: 180 capsule, Rfl: 1    ergocalciferol (VITAMIN D2) 50,000 units, Take 1 capsule (50,000 Units total) by mouth once a week, Disp: 12 capsule, Rfl: 0    ezetimibe (ZETIA) 10 mg tablet, Take 1 tablet (10 mg total) by mouth daily, Disp: 90 tablet, Rfl: 1    glucose monitoring kit (FREESTYLE) monitoring kit, Use 1 each daily in the early morning, Disp: 1 each, Rfl: 0    Lancets (freestyle) lancets, Use as instructed, Disp: 100 each, Rfl: 5    traZODone (DESYREL) 50 mg tablet, Take 1 tablet (50 mg total) by mouth daily at bedtime, Disp: 90 tablet, Rfl: 4    Objective  Vitals:   Wt Readings from Last 3 Encounters:   06/14/24 52.6 kg (116 lb)   03/13/24 53.1 kg (117 lb)   01/12/24 53.3 kg (117 lb 6.4 oz)     Temp Readings from Last 3 Encounters:   06/14/24 98 °F (36.7 °C) (Temporal)   03/13/24 97.5 °F (36.4 °C) (Temporal)   01/12/24 (!) 96.9 °F (36.1 °C) (Temporal)     BP Readings from Last 3 Encounters:   06/14/24 137/76   03/13/24 116/68   01/12/24 120/60     Pulse Readings from Last 3 Encounters:   06/14/24 94   03/13/24 (!) 110   01/12/24 67       Labs:   Lab Results   Component Value Date/Time    HGBA1C 8.3 (A) 06/14/2024 12:04 PM    HGBA1C 8.0 (A) 01/12/2024 10:39 AM    HGBA1C 9.2 (A) 09/29/2023 11:58 AM    HGBA1C 8.6 (H) 04/03/2021 10:16 AM       Lab Results   Component Value Date/Time    BUN 10 01/12/2024 10:07 AM    BUN 13 09/20/2022 12:05 PM    BUN 12 04/03/2021 10:16 AM    CREATININE 0.87 01/12/2024 10:07 AM    CREATININE 1.00 09/20/2022 12:05 PM    CREATININE 0.76 04/03/2021 10:16 AM    EGFR 89 01/12/2024 10:07 AM    EGFR 78  "09/20/2022 12:05 PM    EGFR 111 04/03/2021 10:16 AM       Lab Results   Component Value Date/Time    CREATININEUR 70.1 01/12/2024 10:07 AM    CREATININEUR 187.0 09/20/2022 12:05 PM    LABMICR 37.2 (H) 01/12/2024 10:07 AM    LABMICR 260.0 (H) 09/20/2022 12:05 PM    MICROALBCRE 53 (H) 01/12/2024 10:07 AM    MICROALBCRE 139 (H) 09/20/2022 12:05 PM       Lab Results   Component Value Date/Time    CHOLESTEROL 209 (H) 01/12/2024 10:07 AM    CHOLESTEROL 231 (H) 09/20/2022 12:05 PM    TRIG 71 01/12/2024 10:07 AM    TRIG 191 (H) 09/20/2022 12:05 PM    HDL 75 01/12/2024 10:07 AM    HDL 59 09/20/2022 12:05 PM    LDLCALC 120 (H) 01/12/2024 10:07 AM    LDLCALC 134 (H) 09/20/2022 12:05 PM       Eye Exam: No results found for: \"RIGHTDIABRET\", \"RIGHTMACEDEM\", \"RIGHTOTHERRE\", \"LEFTDIABRET\", \"LEFTMACEDEM\", \"LEFTOTHERRE\"  Jonnathan Tolliver Pharmacist    -----------------------------------------------------------------------------------------------    Pharmacist Tracking Tool  Reason For Outreach: Embedded Pharmacist  Demographics:  Intervention Method: In Person  Type of Intervention: New  Topics Addressed: Diabetes  Pharmacologic Interventions: Medication Initiation  Non-Pharmacologic Interventions: Care coordination, Chart update, Disease state education, and Medication/Device education  Time:  Direct Patient Care:  25  mins  Care Coordination:  20  mins  Recommendation Recipient: Patient/Caregiver  Outcome: Accepted   "

## 2024-06-25 NOTE — TELEPHONE ENCOUNTER
Met pt today for t2dm visit and CGM establish - pt was actually already wearing CGM in clinic but forgot to bring meter    ------    Pt reports uncontrolled neuropathy. Pain radiating down lower back into legs. Sometimes in shoulder and shooting down arms. Could consider treatment for this.Asked pt to make pcp aware     Of note, pt also had a fairly significant tremor that he reported as bothersome. Pt says hand shakes when eating soup etc. - essential tremor? Parkinsonian? Asked pt to f/u with PCP to address.     Joslyn KABA - saw pt with me, she noted we should consider a DXA scan as pt appears frail, complains of osteoarthritis pain, has lower body weight, etc - this sounded reasonable to me also    PRISCILAI - Thank you,     Jonnathan Tolliver, PharmD, BCACP  Ambulatory Care Clinical Pharmacist

## 2024-06-26 ENCOUNTER — PATIENT OUTREACH (OUTPATIENT)
Dept: FAMILY MEDICINE CLINIC | Facility: CLINIC | Age: 69
End: 2024-06-26

## 2024-06-26 NOTE — PROGRESS NOTES
I met the patient at his appointment with Jonnathan.  The patient stated he finally received Trulicity after being without it for months. He did not bring in his  to download his readings.   Jonnathan will attempt to upgrade the patient's Angella but since the patient is not on insulin, it is unknown if it will be approved.  Med rec was performed.  The patient will follow up with Jonnathan in one month.    I will continue to follow.

## 2024-07-15 ENCOUNTER — PATIENT OUTREACH (OUTPATIENT)
Dept: FAMILY MEDICINE CLINIC | Facility: CLINIC | Age: 69
End: 2024-07-15

## 2024-07-15 NOTE — PROGRESS NOTES
I received a call from the patient stating [facility] needs a confirmation regarding the upgrade to the Angella 3; note sent to Jonnathan.     I will continue to follow.

## 2024-07-16 NOTE — PROGRESS NOTES
Found on Gem:    We do have the upgrade order for Angella 3.0.  Patient did confirm the upgrade.  The Angella 3.0 order needs activated on our end then order can ship.

## 2024-07-17 LAB
DME PARACHUTE DELIVERY DATE ACTUAL: NORMAL
DME PARACHUTE DELIVERY DATE REQUESTED: NORMAL
DME PARACHUTE DELIVERY NOTE: NORMAL
DME PARACHUTE ITEM DESCRIPTION: NORMAL
DME PARACHUTE ORDER STATUS: NORMAL
DME PARACHUTE SUPPLIER NAME: NORMAL
DME PARACHUTE SUPPLIER PHONE: NORMAL

## 2024-07-22 ENCOUNTER — PATIENT OUTREACH (OUTPATIENT)
Dept: FAMILY MEDICINE CLINIC | Facility: CLINIC | Age: 69
End: 2024-07-22

## 2024-07-22 NOTE — PROGRESS NOTES
I called the patient to remind him of his appointment with Jonnathan for tomorrow.  The patient notes he did not yet receive the Angella 3. There have been no DM med changes at the patient's last appointment. It was agreed that the patient will reschedule his appointment from tomorrow until August; this way there would be adequate data to upload from the Angella; note sent to clerical.  Patient reports a recent BS reading of 110.    I will continue to follow.

## 2024-08-07 DIAGNOSIS — G89.29 CHRONIC NEUROPATHIC PAIN: ICD-10-CM

## 2024-08-07 DIAGNOSIS — E55.9 VITAMIN D DEFICIENCY: ICD-10-CM

## 2024-08-07 DIAGNOSIS — M79.2 CHRONIC NEUROPATHIC PAIN: ICD-10-CM

## 2024-08-07 RX ORDER — ERGOCALCIFEROL 1.25 MG/1
50000 CAPSULE ORAL WEEKLY
Qty: 12 CAPSULE | Refills: 0 | OUTPATIENT
Start: 2024-08-07

## 2024-08-07 RX ORDER — DULOXETIN HYDROCHLORIDE 30 MG/1
30 CAPSULE, DELAYED RELEASE ORAL 2 TIMES DAILY
Qty: 180 CAPSULE | Refills: 1 | OUTPATIENT
Start: 2024-08-07

## 2024-08-13 ENCOUNTER — PATIENT OUTREACH (OUTPATIENT)
Dept: FAMILY MEDICINE CLINIC | Facility: CLINIC | Age: 69
End: 2024-08-13

## 2024-08-13 NOTE — PROGRESS NOTES
I called the patient to see if he received the Angella 3.  He stated he did and it is working fine.  He reports the following blood sugar readings: lows were 69 and 67 and highs were 122, 119.  He did note  a few elevated readings after lunch when food options were not diabetic friendly.      The patient requests a reminder call for his upcoming appointment with Jonnathan; will call then.    I will continue to follow.

## 2024-08-26 ENCOUNTER — CLINICAL SUPPORT (OUTPATIENT)
Dept: FAMILY MEDICINE CLINIC | Facility: CLINIC | Age: 69
End: 2024-08-26

## 2024-08-26 DIAGNOSIS — E11.69 TYPE 2 DIABETES MELLITUS WITH OTHER SPECIFIED COMPLICATION, UNSPECIFIED WHETHER LONG TERM INSULIN USE (HCC): Primary | ICD-10-CM

## 2024-08-26 RX ORDER — PRAVASTATIN SODIUM 10 MG
10 TABLET ORAL DAILY
Qty: 90 TABLET | Refills: 1 | Status: SHIPPED | OUTPATIENT
Start: 2024-08-26

## 2024-08-26 NOTE — PROGRESS NOTES
[facility]  [address] [place] [address], [address]  [facility] [address] [phone]  Clinical Pharmacy Consultation    Encounter provider: Jonnathan Tolliver, Pharmacist    This patient was referred by PCP for pharmacy consultation. Thank you for the referral. Sending this note to PCP.    Assessment/Plan  1. Type 2 diabetes mellitus with other specified complication, unspecified whether long term insulin use (HCC)  -     pravastatin (PRAVACHOL) 10 mg tablet; Take 1 tablet (10 mg total) by mouth daily      Type 2 diabetes:    Current Diabetes Regimen:  Farxiga 10 mg daily   Trulicity 1.5 mg weekly   Visit focused on type 2 diabetes and progress made  Blood glucose very well controlled   Most recent A1c above goal of <7%.   Recent A1c was over 8%, however progress has been made, see reyna data below  Currently asymptomatic and adherent with treatment plan  Results of recent BG monitoring: Below                Of note, freestyle reyna usually predicts A1c falsely low.  Reviewed this with patient.  This is due to false hypoglycemia, likely this is happening above as patient denies any hypoglycemia symptoms    Counseled patient on the common signs/symptoms of hypoglycemia such as possible dizziness, blurred vision, hunger, sweatiness. Explained these symptoms usually start when BG approaches or drops below 70 ng/dL. Reviewed responsible hypoglycemia treatment and the rule of 15s. Have 15 grams of fast acting carbohydrates such as glucose tablets, hard candy, or orange juice to correct low BG. Check blood sugar again in 15 minutes. Repeat these steps until needed until blood glucose is stable > 70 ng/dL.  HOWEVER stressed fact that reyna is usually falsely low. Pt declines fingerstick to confirtm so will need to base off symptomatic vs asymptomatic at time of low BG on reyna     Confirms he is taking his regimen below, and has no side effects.  Continue    New DM regimen:   Farxiga 10  mg daily   Trulicity 1.5 mg weekly   FUTURE: Can titrate GLP-1 as needed  RTC in PRN     Hyperlipidemia without clinical ASCVD event:  The 10-year ASCVD risk score (Juan TORRES, et al., 2019) is: 31.7%    Values used to calculate the score:      Age: 68 years      Sex: Male      Is Non- : No      Diabetic: Yes      Tobacco smoker: Yes      Systolic Blood Pressure: 137 mmHg      Is BP treated: No      HDL Cholesterol: 75 mg/dL      Total Cholesterol: 209 mg/dL   Most recent lipid panel is within an acceptable range  2018 ACC/AHA blood cholesterol guidelines recommends moderate-intensity statin.  Patient states he is not sure if he is taking his cholesterol medication or not  Reviewed lipid panel with him on the chart, stressed the importance of restarting his pravastatin, sent refill    HTN:   BP goal: <140/90 mmHg   Blood pressure controlled at last follow-up      FYI to PCP:   Will pend Rx for PCP signature/concurrence  Orders placed under CPA    Medication list was updated and discussed with patient.   Discontinued medications: None   Clinically significant drug interactions identified: None   Side effects from medication(s) reported: None  Went through full medication list and restarted which medications were important at the patient was not taking    The patient communicates understanding of the treatment plan.    M*Modal Dragon software was used to dictate this note. It may contain errors with dictating incorrect words or incorrect spelling. Please contact the provider directly with any questions. Thank you for your understanding.    Subjective  Sherwin Ruiz is a 68 y.o. male who presents in-person for follow-up.  Reason for visit: diabetes.   No particular questions or concerns.  HPI     Social History     Tobacco Use   Smoking Status Every Day    Current packs/day: 0.25    Types: Cigarettes   Smokeless Tobacco Never   Tobacco Comments    Smokes about 5 cig/day        No Known Allergies        Current Outpatient Medications:     pravastatin (PRAVACHOL) 10 mg tablet, Take 1 tablet (10 mg total) by mouth daily, Disp: 90 tablet, Rfl: 1    Alcohol Swabs (Alcohol Pads) 70 % PADS, Use daily in the early morning, Disp: 100 each, Rfl: 5    cholecalciferol (VITAMIN D3) 1,000 units tablet, Take 1 tablet (1,000 Units total) by mouth daily, Disp: 90 tablet, Rfl: 2    dapagliflozin (Farxiga) 10 MG tablet, Take 1 tablet (10 mg total) by mouth daily, Disp: 90 tablet, Rfl: 3    dulaglutide (Trulicity) 1.5 MG/0.5ML injection, Inject 0.5 mL (1.5 mg total) under the skin every 7 days, Disp: 6 mL, Rfl: 1    DULoxetine (CYMBALTA) 30 mg delayed release capsule, Take 1 capsule (30 mg total) by mouth 2 (two) times a day, Disp: 180 capsule, Rfl: 1    ergocalciferol (VITAMIN D2) 50,000 units, Take 1 capsule (50,000 Units total) by mouth once a week, Disp: 12 capsule, Rfl: 0    ezetimibe (ZETIA) 10 mg tablet, Take 1 tablet (10 mg total) by mouth daily, Disp: 90 tablet, Rfl: 1    glucose monitoring kit (FREESTYLE) monitoring kit, Use 1 each daily in the early morning, Disp: 1 each, Rfl: 0    Lancets (freestyle) lancets, Use as instructed, Disp: 100 each, Rfl: 5    traZODone (DESYREL) 50 mg tablet, Take 1 tablet (50 mg total) by mouth daily at bedtime, Disp: 90 tablet, Rfl: 4    Objective  Vitals:   Wt Readings from Last 3 Encounters:   06/14/24 52.6 kg (116 lb)   03/13/24 53.1 kg (117 lb)   01/12/24 53.3 kg (117 lb 6.4 oz)     Temp Readings from Last 3 Encounters:   06/14/24 98 °F (36.7 °C) (Temporal)   03/13/24 97.5 °F (36.4 °C) (Temporal)   01/12/24 (!) 96.9 °F (36.1 °C) (Temporal)     BP Readings from Last 3 Encounters:   06/14/24 137/76   03/13/24 116/68   01/12/24 120/60     Pulse Readings from Last 3 Encounters:   06/14/24 94   03/13/24 (!) 110   01/12/24 67       Labs:   Lab Results   Component Value Date/Time    HGBA1C 8.3 (A) 06/14/2024 12:04 PM    HGBA1C 8.0 (A) 01/12/2024 10:39 AM    HGBA1C 9.2 (A) 09/29/2023 11:58  "AM    HGBA1C 8.6 (H) 04/03/2021 10:16 AM       Lab Results   Component Value Date/Time    BUN 10 01/12/2024 10:07 AM    BUN 13 09/20/2022 12:05 PM    BUN 12 04/03/2021 10:16 AM    CREATININE 0.87 01/12/2024 10:07 AM    CREATININE 1.00 09/20/2022 12:05 PM    CREATININE 0.76 04/03/2021 10:16 AM    EGFR 89 01/12/2024 10:07 AM    EGFR 78 09/20/2022 12:05 PM    EGFR 111 04/03/2021 10:16 AM       Lab Results   Component Value Date/Time    CREATININEUR 70.1 01/12/2024 10:07 AM    CREATININEUR 187.0 09/20/2022 12:05 PM    LABMICR 37.2 (H) 01/12/2024 10:07 AM    LABMICR 260.0 (H) 09/20/2022 12:05 PM    MICROALBCRE 53 (H) 01/12/2024 10:07 AM    MICROALBCRE 139 (H) 09/20/2022 12:05 PM       Lab Results   Component Value Date/Time    CHOLESTEROL 209 (H) 01/12/2024 10:07 AM    CHOLESTEROL 231 (H) 09/20/2022 12:05 PM    TRIG 71 01/12/2024 10:07 AM    TRIG 191 (H) 09/20/2022 12:05 PM    HDL 75 01/12/2024 10:07 AM    HDL 59 09/20/2022 12:05 PM    LDLCALC 120 (H) 01/12/2024 10:07 AM    LDLCALC 134 (H) 09/20/2022 12:05 PM       Eye Exam: No results found for: \"RIGHTDIABRET\", \"RIGHTMACEDEM\", \"RIGHTOTHERRE\", \"LEFTDIABRET\", \"LEFTMACEDEM\", \"LEFTOTHERRE\"  Kellie Remy    -----------------------------------------------------------------------------------------------    Pharmacist Tracking Tool  Reason For Outreach: Embedded Pharmacist  Demographics:  Intervention Method: In Person  Type of Intervention: Follow-Up  Topics Addressed: Diabetes  Pharmacologic Interventions: Med Rec  Non-Pharmacologic Interventions: Care coordination, Chart update, Disease state education, Medication Monitoring, and Personal CGM  Time:  Direct Patient Care:  20  mins  Care Coordination:  20  mins  Recommendation Recipient: Patient/Caregiver  Outcome: Accepted   "

## 2024-09-16 ENCOUNTER — PATIENT OUTREACH (OUTPATIENT)
Dept: FAMILY MEDICINE CLINIC | Facility: CLINIC | Age: 69
End: 2024-09-16

## 2024-09-16 NOTE — PROGRESS NOTES
I called the patient but received voicemail. Message was left reminding him of his PCP appointment for tomorrow at 120.  I will continue further outreach.

## 2024-09-17 ENCOUNTER — OFFICE VISIT (OUTPATIENT)
Dept: FAMILY MEDICINE CLINIC | Facility: CLINIC | Age: 69
End: 2024-09-17

## 2024-09-17 VITALS
HEIGHT: 60 IN | TEMPERATURE: 97.6 F | DIASTOLIC BLOOD PRESSURE: 72 MMHG | HEART RATE: 81 BPM | WEIGHT: 122.6 LBS | BODY MASS INDEX: 24.07 KG/M2 | OXYGEN SATURATION: 98 % | SYSTOLIC BLOOD PRESSURE: 114 MMHG

## 2024-09-17 DIAGNOSIS — R25.1 TREMORS OF NERVOUS SYSTEM: ICD-10-CM

## 2024-09-17 DIAGNOSIS — E11.69 TYPE 2 DIABETES MELLITUS WITH OTHER SPECIFIED COMPLICATION, UNSPECIFIED WHETHER LONG TERM INSULIN USE (HCC): Primary | ICD-10-CM

## 2024-09-17 DIAGNOSIS — E78.00 PURE HYPERCHOLESTEROLEMIA: ICD-10-CM

## 2024-09-17 DIAGNOSIS — F17.200 NICOTINE USE DISORDER: ICD-10-CM

## 2024-09-17 DIAGNOSIS — E55.9 VITAMIN D DEFICIENCY: ICD-10-CM

## 2024-09-17 LAB — SL AMB POCT HEMOGLOBIN AIC: 7.2 (ref ?–6.5)

## 2024-09-17 PROCEDURE — 83036 HEMOGLOBIN GLYCOSYLATED A1C: CPT | Performed by: FAMILY MEDICINE

## 2024-09-17 PROCEDURE — 99214 OFFICE O/P EST MOD 30 MIN: CPT | Performed by: FAMILY MEDICINE

## 2024-09-17 NOTE — ASSESSMENT & PLAN NOTE
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
## 2024-09-17 NOTE — ASSESSMENT & PLAN NOTE
Lab Results   Component Value Date    HGBA1C 7.2 (A) 09/17/2024     Stable, improved  Current medications:  Farxiga 10 mg daily, Trulicity 1.5 mg weekly  CGM: Freestyle Angella 3     Plan:  Continue Farxiga and Trulicity  2.   DM diet  3.   DM Eye exam pending  4.   DM foot exam completed 6/2024    Orders:    POCT hemoglobin A1c    CBC and differential; Future    Comprehensive metabolic panel; Future    Hemoglobin A1C; Future

## 2024-09-17 NOTE — ASSESSMENT & PLAN NOTE
Lab Results   Component Value Date/Time     CHOLESTEROL 209 (H) 01/12/2024 10:07 AM     TRIG 71 01/12/2024 10:07 AM     HDL 75 01/12/2024 10:07 AM     LDLCALC 120 (H) 01/12/2024 10:07 AM         Continue Zetia 10 mg daily  Low Fat Diet, regular Exercise  Orders:    Lipid panel; Future

## 2024-09-18 ENCOUNTER — TELEPHONE (OUTPATIENT)
Dept: ADMINISTRATIVE | Facility: OTHER | Age: 69
End: 2024-09-18

## 2024-09-18 NOTE — TELEPHONE ENCOUNTER
Upon review of the In Basket request we were able to identify that the patient had the requested item(s) completed internally. Internal labs/procedures/tests are not able to be linked to . HM will show as updated but a hyperlink to the document is not possible at this time. The item(s) requested can be found within the Chart Review tabs.     Any additional questions or concerns should be emailed to the Practice Liaisons via the appropriate education email address, please do not reply via In Basket.    Thank you  Hayde Jordan   [facility]

## 2024-09-18 NOTE — TELEPHONE ENCOUNTER
----- Message from Reshma Jackson MD sent at 9/17/2024  1:55 PM EDT -----  Regarding: Care Gap Request  09/17/24 1:55 PM    Hello, our patient Sherwin Ruiz has had Diabetic Foot Exam completed/performed. Please assist in updating the patient chart by pulling the document from Encounters Tab within Chart Review. The date of service is 6/14/2024.     Thank you,  Reshma Jackson MD  [facility]

## 2024-10-30 ENCOUNTER — PATIENT OUTREACH (OUTPATIENT)
Dept: FAMILY MEDICINE CLINIC | Facility: CLINIC | Age: 69
End: 2024-10-30

## 2024-10-30 NOTE — PROGRESS NOTES
I called the patient to follow up.  He states he has been feeling well.  He notes he is taking Farxiga and Trulicity as ordered.  The patient reports the following blood sugar readings: 173, 186, 176 but note in the pm they are elevated at 250, 243 and 231.  The patient admits his readings are elevated due to dietary indiscretions.  I encouraged him to get back on track.   The patient states he continues checking his feet for skin breakdown.    The patient reports a rash on his buttocks for over a month.  He notes he will try hydrocortisone cream to the area.  I asked that if this does not improve the rash or if it worsens, to let us know.    The patient will be due for a PCP follow up and repeat A1C in December; note sent to clerical.    I will continue to follow.

## 2024-11-01 NOTE — PROGRESS NOTES
third attempt to contact patient. no answer left message to return my call on answering machine    Letter send

## 2024-11-04 DIAGNOSIS — E11.69 TYPE 2 DIABETES MELLITUS WITH OTHER SPECIFIED COMPLICATION, UNSPECIFIED WHETHER LONG TERM INSULIN USE (HCC): ICD-10-CM

## 2024-11-12 RX ORDER — DULAGLUTIDE 1.5 MG/.5ML
INJECTION, SOLUTION SUBCUTANEOUS
Qty: 6 ML | Refills: 1 | Status: SHIPPED | OUTPATIENT
Start: 2024-11-12

## 2024-12-14 DIAGNOSIS — M79.2 CHRONIC NEUROPATHIC PAIN: ICD-10-CM

## 2024-12-14 DIAGNOSIS — G89.29 CHRONIC NEUROPATHIC PAIN: ICD-10-CM

## 2024-12-16 RX ORDER — DULOXETIN HYDROCHLORIDE 30 MG/1
30 CAPSULE, DELAYED RELEASE ORAL 2 TIMES DAILY
Qty: 60 CAPSULE | Refills: 5 | Status: SHIPPED | OUTPATIENT
Start: 2024-12-16

## 2024-12-17 ENCOUNTER — PATIENT OUTREACH (OUTPATIENT)
Dept: FAMILY MEDICINE CLINIC | Facility: CLINIC | Age: 69
End: 2024-12-17

## 2024-12-17 DIAGNOSIS — Z79.4 TYPE 2 DIABETES MELLITUS WITH OTHER SPECIFIED COMPLICATION, WITH LONG-TERM CURRENT USE OF INSULIN (HCC): ICD-10-CM

## 2024-12-17 DIAGNOSIS — E11.69 TYPE 2 DIABETES MELLITUS WITH OTHER SPECIFIED COMPLICATION, WITH LONG-TERM CURRENT USE OF INSULIN (HCC): ICD-10-CM

## 2024-12-17 RX ORDER — DAPAGLIFLOZIN 10 MG/1
10 TABLET, FILM COATED ORAL DAILY
Qty: 90 TABLET | Refills: 3 | Status: SHIPPED | OUTPATIENT
Start: 2024-12-17 | End: 2025-12-12

## 2024-12-17 NOTE — PROGRESS NOTES
I called the patient to follow up.  He states he has been feeling well.  He notes he is very involved in the community and has been busy which I commended him on.    The patient reports his blood sugars have been elevated due to dietary indiscretions and not injecting Trulicity.  He noted one reading was HIGH and majority have been >250.  I urged him to restart Trulicity and he promised he will start it today. He notes he is taking Farxiga.  I stressed the importance of getting back on track especially after the holidays and he agreed.    Note sent to staff to schedule PCP follow up.  I will call the patient prior to his appointment so he can have labs drawn.

## 2025-01-06 ENCOUNTER — TELEPHONE (OUTPATIENT)
Dept: FAMILY MEDICINE CLINIC | Facility: CLINIC | Age: 70
End: 2025-01-06

## 2025-01-23 ENCOUNTER — PATIENT OUTREACH (OUTPATIENT)
Dept: FAMILY MEDICINE CLINIC | Facility: CLINIC | Age: 70
End: 2025-01-23

## 2025-01-23 NOTE — PROGRESS NOTES
I called the patient to ask him to have labs drawn so the results can be reviewed at the time of his PCP appointment 1/31; he agreed.  I asked that he be fasting for the labs.

## 2025-01-24 ENCOUNTER — RA CDI HCC (OUTPATIENT)
Dept: OTHER | Facility: HOSPITAL | Age: 70
End: 2025-01-24

## 2025-01-29 ENCOUNTER — APPOINTMENT (OUTPATIENT)
Dept: LAB | Facility: CLINIC | Age: 70
End: 2025-01-29
Payer: COMMERCIAL

## 2025-01-29 DIAGNOSIS — M79.10 MYALGIA DUE TO STATIN: ICD-10-CM

## 2025-01-29 DIAGNOSIS — T46.6X5A MYALGIA DUE TO STATIN: ICD-10-CM

## 2025-01-29 DIAGNOSIS — R25.1 TREMORS OF NERVOUS SYSTEM: ICD-10-CM

## 2025-01-29 DIAGNOSIS — E78.00 PURE HYPERCHOLESTEROLEMIA: ICD-10-CM

## 2025-01-29 DIAGNOSIS — E11.69 TYPE 2 DIABETES MELLITUS WITH OTHER SPECIFIED COMPLICATION, UNSPECIFIED WHETHER LONG TERM INSULIN USE (HCC): ICD-10-CM

## 2025-01-29 DIAGNOSIS — E55.9 VITAMIN D DEFICIENCY: Primary | ICD-10-CM

## 2025-01-29 LAB
25(OH)D3 SERPL-MCNC: 30.2 NG/ML (ref 30–100)
BASOPHILS # BLD AUTO: 0.04 THOUSANDS/ΜL (ref 0–0.1)
BASOPHILS NFR BLD AUTO: 1 % (ref 0–1)
EOSINOPHIL # BLD AUTO: 0.15 THOUSAND/ΜL (ref 0–0.61)
EOSINOPHIL NFR BLD AUTO: 2 % (ref 0–6)
ERYTHROCYTE [DISTWIDTH] IN BLOOD BY AUTOMATED COUNT: 13.2 % (ref 11.6–15.1)
FOLATE SERPL-MCNC: 16.6 NG/ML
HCT VFR BLD AUTO: 49.8 % (ref 36.5–49.3)
HGB BLD-MCNC: 16.1 G/DL (ref 12–17)
IMM GRANULOCYTES # BLD AUTO: 0.03 THOUSAND/UL (ref 0–0.2)
IMM GRANULOCYTES NFR BLD AUTO: 0 % (ref 0–2)
LYMPHOCYTES # BLD AUTO: 2.02 THOUSANDS/ΜL (ref 0.6–4.47)
LYMPHOCYTES NFR BLD AUTO: 29 % (ref 14–44)
MCH RBC QN AUTO: 29.4 PG (ref 26.8–34.3)
MCHC RBC AUTO-ENTMCNC: 32.3 G/DL (ref 31.4–37.4)
MCV RBC AUTO: 91 FL (ref 82–98)
MONOCYTES # BLD AUTO: 0.79 THOUSAND/ΜL (ref 0.17–1.22)
MONOCYTES NFR BLD AUTO: 11 % (ref 4–12)
NEUTROPHILS # BLD AUTO: 3.93 THOUSANDS/ΜL (ref 1.85–7.62)
NEUTS SEG NFR BLD AUTO: 57 % (ref 43–75)
NRBC BLD AUTO-RTO: 0 /100 WBCS
PLATELET # BLD AUTO: 191 THOUSANDS/UL (ref 149–390)
PMV BLD AUTO: 11.5 FL (ref 8.9–12.7)
RBC # BLD AUTO: 5.47 MILLION/UL (ref 3.88–5.62)
VIT B12 SERPL-MCNC: 376 PG/ML (ref 180–914)
WBC # BLD AUTO: 6.96 THOUSAND/UL (ref 4.31–10.16)

## 2025-01-29 PROCEDURE — 82550 ASSAY OF CK (CPK): CPT

## 2025-01-29 PROCEDURE — 82746 ASSAY OF FOLIC ACID SERUM: CPT

## 2025-01-29 PROCEDURE — 83036 HEMOGLOBIN GLYCOSYLATED A1C: CPT

## 2025-01-29 PROCEDURE — 36415 COLL VENOUS BLD VENIPUNCTURE: CPT

## 2025-01-29 PROCEDURE — 82306 VITAMIN D 25 HYDROXY: CPT

## 2025-01-29 PROCEDURE — 80061 LIPID PANEL: CPT

## 2025-01-29 PROCEDURE — 85025 COMPLETE CBC W/AUTO DIFF WBC: CPT

## 2025-01-29 PROCEDURE — 80053 COMPREHEN METABOLIC PANEL: CPT

## 2025-01-29 PROCEDURE — 82607 VITAMIN B-12: CPT

## 2025-01-30 LAB
ALBUMIN SERPL BCG-MCNC: 4.3 G/DL (ref 3.5–5)
ALP SERPL-CCNC: 65 U/L (ref 34–104)
ALT SERPL W P-5'-P-CCNC: 13 U/L (ref 7–52)
ANION GAP SERPL CALCULATED.3IONS-SCNC: 12 MMOL/L (ref 4–13)
AST SERPL W P-5'-P-CCNC: 19 U/L (ref 13–39)
BILIRUB SERPL-MCNC: 0.61 MG/DL (ref 0.2–1)
BUN SERPL-MCNC: 10 MG/DL (ref 5–25)
CALCIUM SERPL-MCNC: 9.8 MG/DL (ref 8.4–10.2)
CHLORIDE SERPL-SCNC: 97 MMOL/L (ref 96–108)
CHOLEST SERPL-MCNC: 207 MG/DL (ref ?–200)
CK SERPL-CCNC: 65 U/L (ref 39–308)
CO2 SERPL-SCNC: 28 MMOL/L (ref 21–32)
CREAT SERPL-MCNC: 1.03 MG/DL (ref 0.6–1.3)
EST. AVERAGE GLUCOSE BLD GHB EST-MCNC: 203 MG/DL
GFR SERPL CREATININE-BSD FRML MDRD: 74 ML/MIN/1.73SQ M
GLUCOSE P FAST SERPL-MCNC: 127 MG/DL (ref 65–99)
HBA1C MFR BLD: 8.7 %
HDLC SERPL-MCNC: 106 MG/DL
LDLC SERPL CALC-MCNC: 63 MG/DL (ref 0–100)
NONHDLC SERPL-MCNC: 101 MG/DL
POTASSIUM SERPL-SCNC: 4.5 MMOL/L (ref 3.5–5.3)
PROT SERPL-MCNC: 7.2 G/DL (ref 6.4–8.4)
SODIUM SERPL-SCNC: 137 MMOL/L (ref 135–147)
TRIGL SERPL-MCNC: 190 MG/DL (ref ?–150)

## 2025-01-31 ENCOUNTER — OFFICE VISIT (OUTPATIENT)
Dept: FAMILY MEDICINE CLINIC | Facility: CLINIC | Age: 70
End: 2025-01-31

## 2025-01-31 VITALS
RESPIRATION RATE: 18 BRPM | HEART RATE: 86 BPM | OXYGEN SATURATION: 96 % | TEMPERATURE: 97.9 F | DIASTOLIC BLOOD PRESSURE: 70 MMHG | HEIGHT: 60 IN | BODY MASS INDEX: 24.54 KG/M2 | SYSTOLIC BLOOD PRESSURE: 122 MMHG | WEIGHT: 125 LBS

## 2025-01-31 DIAGNOSIS — E55.9 VITAMIN D DEFICIENCY: ICD-10-CM

## 2025-01-31 DIAGNOSIS — E11.69 TYPE 2 DIABETES MELLITUS WITH OTHER SPECIFIED COMPLICATION, UNSPECIFIED WHETHER LONG TERM INSULIN USE (HCC): Primary | ICD-10-CM

## 2025-01-31 DIAGNOSIS — M79.2 CHRONIC NEUROPATHIC PAIN: ICD-10-CM

## 2025-01-31 DIAGNOSIS — E78.2 MIXED HYPERLIPIDEMIA: ICD-10-CM

## 2025-01-31 DIAGNOSIS — G89.29 CHRONIC NEUROPATHIC PAIN: ICD-10-CM

## 2025-01-31 PROBLEM — E78.00 PURE HYPERCHOLESTEROLEMIA: Status: RESOLVED | Noted: 2022-08-24 | Resolved: 2025-01-31

## 2025-01-31 PROCEDURE — G2211 COMPLEX E/M VISIT ADD ON: HCPCS | Performed by: FAMILY MEDICINE

## 2025-01-31 PROCEDURE — 99214 OFFICE O/P EST MOD 30 MIN: CPT | Performed by: FAMILY MEDICINE

## 2025-01-31 RX ORDER — DULOXETIN HYDROCHLORIDE 30 MG/1
30 CAPSULE, DELAYED RELEASE ORAL 2 TIMES DAILY
Qty: 60 CAPSULE | Refills: 5 | Status: SHIPPED | OUTPATIENT
Start: 2025-01-31

## 2025-01-31 RX ORDER — EZETIMIBE 10 MG/1
10 TABLET ORAL DAILY
Qty: 90 TABLET | Refills: 1 | Status: SHIPPED | OUTPATIENT
Start: 2025-01-31

## 2025-01-31 RX ORDER — PRAVASTATIN SODIUM 10 MG
10 TABLET ORAL DAILY
Qty: 90 TABLET | Refills: 1 | Status: SHIPPED | OUTPATIENT
Start: 2025-01-31

## 2025-01-31 RX ORDER — CHOLECALCIFEROL (VITAMIN D3) 25 MCG
1000 TABLET ORAL DAILY
Qty: 90 TABLET | Refills: 2 | Status: SHIPPED | OUTPATIENT
Start: 2025-01-31

## 2025-01-31 RX ORDER — DULAGLUTIDE 1.5 MG/.5ML
1.5 INJECTION, SOLUTION SUBCUTANEOUS WEEKLY
Qty: 6 ML | Refills: 2 | Status: SHIPPED | OUTPATIENT
Start: 2025-01-31
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
## 2025-01-31 NOTE — ASSESSMENT & PLAN NOTE
Lab Results   Component Value Date/Time    CHOLESTEROL 207 (H) 01/29/2025 10:03 AM    TRIG 190 (H) 01/29/2025 10:03 AM     01/29/2025 10:03 AM    LDLCALC 63 01/29/2025 10:03 AM       Continue Ezetimibe 10 mg daily  Decrease carb and fat in diet  Regular Exercise    Orders:  •  ezetimibe (ZETIA) 10 mg tablet; Take 1 tablet (10 mg total) by mouth daily

## 2025-01-31 NOTE — ASSESSMENT & PLAN NOTE
Lab Results   Component Value Date    HGBA1C 8.7 (H) 01/29/2025     Increased Hemoglobin A1c, not at goal  Current medications:  Farxiga 10 mg daily, Trulicity 1.5 mg weekly  CGM: Freestyle Angella 3     Plan:  Continue Farxiga and Trulicity  2.   DM diet- pt retorts some dietary indiscretion during the winter holidays  3.   DM Eye exam pending  4.   DM foot exam completed 6/2024    Orders:  •  Dulaglutide (Trulicity) 1.5 MG/0.5ML SOAJ; Inject 1.5 mg under the skin once a week  •  pravastatin (PRAVACHOL) 10 mg tablet; Take 1 tablet (10 mg total) by mouth daily  •  Albumin / creatinine urine ratio; Future  •  Ambulatory Referral to Ophthalmology; Future  •  Ambulatory referral to Podiatry; Future

## 2025-01-31 NOTE — ASSESSMENT & PLAN NOTE
Orders:  •  DULoxetine (CYMBALTA) 30 mg delayed release capsule; Take 1 capsule (30 mg total) by mouth 2 (two) times a day

## 2025-02-04 ENCOUNTER — PATIENT OUTREACH (OUTPATIENT)
Dept: FAMILY MEDICINE CLINIC | Facility: CLINIC | Age: 70
End: 2025-02-04

## 2025-02-04 NOTE — PROGRESS NOTES
I called the patient to follow up.  His A1C increased to 8.7 but the patient admits this is due to dietary indiscretions over the holidays.  He is motivated to get back on track to decrease it.  He states he is otherwise feeling well without any concerns.    I will be closing the patient's case at this time and the patient is in agreement.  He was very appreciative of the help over the past few years.

## 2025-05-01 ENCOUNTER — APPOINTMENT (EMERGENCY)
Dept: CT IMAGING | Facility: HOSPITAL | Age: 70
End: 2025-05-01
Payer: COMMERCIAL

## 2025-05-01 ENCOUNTER — HOSPITAL ENCOUNTER (EMERGENCY)
Facility: HOSPITAL | Age: 70
Discharge: HOME | End: 2025-05-01
Attending: EMERGENCY MEDICINE
Payer: COMMERCIAL

## 2025-05-01 VITALS
SYSTOLIC BLOOD PRESSURE: 148 MMHG | RESPIRATION RATE: 18 BRPM | HEART RATE: 91 BPM | TEMPERATURE: 97.9 F | BODY MASS INDEX: 24.67 KG/M2 | OXYGEN SATURATION: 97 % | WEIGHT: 126.32 LBS | DIASTOLIC BLOOD PRESSURE: 78 MMHG

## 2025-05-01 DIAGNOSIS — L03.211 FACIAL CELLULITIS: ICD-10-CM

## 2025-05-01 DIAGNOSIS — K04.7 DENTAL ABSCESS: Primary | ICD-10-CM

## 2025-05-01 LAB
ALBUMIN SERPL BCG-MCNC: 4.1 G/DL (ref 3.5–5)
ALP SERPL-CCNC: 76 U/L (ref 34–104)
ALT SERPL W P-5'-P-CCNC: 12 U/L (ref 7–52)
ANION GAP SERPL CALCULATED.3IONS-SCNC: 9 MMOL/L (ref 4–13)
AST SERPL W P-5'-P-CCNC: 11 U/L (ref 13–39)
BASOPHILS # BLD AUTO: 0.03 THOUSANDS/ÂΜL (ref 0–0.1)
BASOPHILS NFR BLD AUTO: 0 % (ref 0–1)
BILIRUB SERPL-MCNC: 0.83 MG/DL (ref 0.2–1)
BUN SERPL-MCNC: 10 MG/DL (ref 5–25)
CALCIUM SERPL-MCNC: 9.3 MG/DL (ref 8.4–10.2)
CHLORIDE SERPL-SCNC: 98 MMOL/L (ref 96–108)
CO2 SERPL-SCNC: 27 MMOL/L (ref 21–32)
CREAT SERPL-MCNC: 0.74 MG/DL (ref 0.6–1.3)
EOSINOPHIL # BLD AUTO: 0.1 THOUSAND/ÂΜL (ref 0–0.61)
EOSINOPHIL NFR BLD AUTO: 1 % (ref 0–6)
ERYTHROCYTE [DISTWIDTH] IN BLOOD BY AUTOMATED COUNT: 12 % (ref 11.6–15.1)
GFR SERPL CREATININE-BSD FRML MDRD: 94 ML/MIN/1.73SQ M
GLUCOSE SERPL-MCNC: 235 MG/DL (ref 65–140)
HCT VFR BLD AUTO: 43.5 % (ref 36.5–49.3)
HGB BLD-MCNC: 15.1 G/DL (ref 12–17)
IMM GRANULOCYTES # BLD AUTO: 0.03 THOUSAND/UL (ref 0–0.2)
IMM GRANULOCYTES NFR BLD AUTO: 0 % (ref 0–2)
LYMPHOCYTES # BLD AUTO: 1.31 THOUSANDS/ÂΜL (ref 0.6–4.47)
LYMPHOCYTES NFR BLD AUTO: 15 % (ref 14–44)
MCH RBC QN AUTO: 30.9 PG (ref 26.8–34.3)
MCHC RBC AUTO-ENTMCNC: 34.7 G/DL (ref 31.4–37.4)
MCV RBC AUTO: 89 FL (ref 82–98)
MONOCYTES # BLD AUTO: 0.91 THOUSAND/ÂΜL (ref 0.17–1.22)
MONOCYTES NFR BLD AUTO: 11 % (ref 4–12)
NEUTROPHILS # BLD AUTO: 6.12 THOUSANDS/ÂΜL (ref 1.85–7.62)
NEUTS SEG NFR BLD AUTO: 73 % (ref 43–75)
NRBC BLD AUTO-RTO: 0 /100 WBCS
PLATELET # BLD AUTO: 155 THOUSANDS/UL (ref 149–390)
PMV BLD AUTO: 10.6 FL (ref 8.9–12.7)
POTASSIUM SERPL-SCNC: 4.1 MMOL/L (ref 3.5–5.3)
PROT SERPL-MCNC: 7.1 G/DL (ref 6.4–8.4)
RBC # BLD AUTO: 4.89 MILLION/UL (ref 3.88–5.62)
SODIUM SERPL-SCNC: 134 MMOL/L (ref 135–147)
WBC # BLD AUTO: 8.5 THOUSAND/UL (ref 4.31–10.16)

## 2025-05-01 PROCEDURE — 96365 THER/PROPH/DIAG IV INF INIT: CPT

## 2025-05-01 PROCEDURE — 36415 COLL VENOUS BLD VENIPUNCTURE: CPT

## 2025-05-01 PROCEDURE — 99284 EMERGENCY DEPT VISIT MOD MDM: CPT

## 2025-05-01 PROCEDURE — 96375 TX/PRO/DX INJ NEW DRUG ADDON: CPT

## 2025-05-01 PROCEDURE — 85025 COMPLETE CBC W/AUTO DIFF WBC: CPT

## 2025-05-01 PROCEDURE — 80053 COMPREHEN METABOLIC PANEL: CPT

## 2025-05-01 PROCEDURE — 70487 CT MAXILLOFACIAL W/DYE: CPT

## 2025-05-01 PROCEDURE — 99285 EMERGENCY DEPT VISIT HI MDM: CPT | Performed by: EMERGENCY MEDICINE

## 2025-05-01 RX ORDER — CLINDAMYCIN HYDROCHLORIDE 150 MG/1
300 CAPSULE ORAL EVERY 6 HOURS
Qty: 80 CAPSULE | Refills: 0 | Status: SHIPPED | OUTPATIENT
Start: 2025-05-01 | End: 2025-05-01 | Stop reason: SDUPTHER

## 2025-05-01 RX ORDER — CLINDAMYCIN HYDROCHLORIDE 150 MG/1
300 CAPSULE ORAL EVERY 6 HOURS
Qty: 80 CAPSULE | Refills: 0 | Status: SHIPPED | OUTPATIENT
Start: 2025-05-01 | End: 2025-05-11

## 2025-05-01 RX ORDER — CLINDAMYCIN PHOSPHATE 600 MG/50ML
600 INJECTION, SOLUTION INTRAVENOUS ONCE
Status: COMPLETED | OUTPATIENT
Start: 2025-05-01 | End: 2025-05-01

## 2025-05-01 RX ORDER — KETOROLAC TROMETHAMINE 30 MG/ML
15 INJECTION, SOLUTION INTRAMUSCULAR; INTRAVENOUS ONCE
Status: COMPLETED | OUTPATIENT
Start: 2025-05-01 | End: 2025-05-01

## 2025-05-01 RX ADMIN — KETOROLAC TROMETHAMINE 15 MG: 30 INJECTION, SOLUTION INTRAMUSCULAR; INTRAVENOUS at 17:55

## 2025-05-01 RX ADMIN — IOHEXOL 85 ML: 350 INJECTION, SOLUTION INTRAVENOUS at 19:10

## 2025-05-01 RX ADMIN — CLINDAMYCIN PHOSPHATE 600 MG: 600 INJECTION, SOLUTION INTRAVENOUS at 17:54

## 2025-05-01 NOTE — ED ATTENDING ATTESTATION
5/1/2025  I, Karsten Bajwa MD, saw and evaluated the patient. I have discussed the patient with the resident/non-physician practitioner and agree with the resident's/non-physician practitioner's findings, Plan of Care, and MDM as documented in the resident's/non-physician practitioner's note, except where noted. All available labs and Radiology studies were reviewed.  I was present for key portions of any procedure(s) performed by the resident/non-physician practitioner and I was immediately available to provide assistance.       At this point I agree with the current assessment done in the Emergency Department.  I have conducted an independent evaluation of this patient a history and physical is as follows:  Patient is a 68 yo DM, HLD, right lower facial/lip swelling and pain for past 2 days, denies dental pain but has not seen Dentist for a while, eating normally, no dysphagia, has happened once in past and he stuck a needle himself. On exam, conscious, alert, stable vitals, no trismus or drooling, normal speech, airway intact, firm swelling noted over right lower jaw/submandibular involving right lower lip. Impression: Facial Cellulitis, Periapical abscess, r/o deep infection/abscess, we will check labs, get CT face, will give pain meds, Abx.    ED Course     Labs non-acute. CT with findings c/w facial cellulitis, lower dental abscess. Empiric Abx, Toardol was give. Resident discussed the case with [facility], will see on Office in AM. Abx sent to Pharmacy. Pt advised to call [facility] office on AM for appointment. RTED for worsening swelling, fevers, trouble swallowing/breathing.    Critical Care Time  Procedures
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
## 2025-05-02 NOTE — DISCHARGE INSTRUCTIONS
You were seen in the emergency department for face swelling.  You have an infection.  Call the listed number at 8 AM in the morning and ask for priority appointment to be seen tomorrow.  Do not eat or drink after midnight.  You can take Tylenol 650 mg and ibuprofen 800 mg every 6 hours as needed for pain.  Return to the emergency department if you develop inability to swallow, throat feels like it is closing up, inability to close your mouth, or any new or concerning symptoms.     [phone]

## 2025-05-16 ENCOUNTER — OFFICE VISIT (OUTPATIENT)
Dept: FAMILY MEDICINE CLINIC | Facility: CLINIC | Age: 70
End: 2025-05-16

## 2025-05-16 VITALS
HEART RATE: 79 BPM | OXYGEN SATURATION: 95 % | DIASTOLIC BLOOD PRESSURE: 64 MMHG | SYSTOLIC BLOOD PRESSURE: 110 MMHG | HEIGHT: 60 IN | BODY MASS INDEX: 25.35 KG/M2 | WEIGHT: 129.1 LBS | RESPIRATION RATE: 16 BRPM | TEMPERATURE: 97.6 F

## 2025-05-16 DIAGNOSIS — G89.29 CHRONIC PAIN OF BOTH HIPS: ICD-10-CM

## 2025-05-16 DIAGNOSIS — R26.2 AMBULATORY DYSFUNCTION: ICD-10-CM

## 2025-05-16 DIAGNOSIS — M25.552 CHRONIC PAIN OF BOTH HIPS: ICD-10-CM

## 2025-05-16 DIAGNOSIS — R25.1 TREMORS OF NERVOUS SYSTEM: ICD-10-CM

## 2025-05-16 DIAGNOSIS — M25.551 CHRONIC PAIN OF BOTH HIPS: ICD-10-CM

## 2025-05-16 DIAGNOSIS — Z00.00 MEDICARE ANNUAL WELLNESS VISIT, SUBSEQUENT: Primary | ICD-10-CM

## 2025-05-16 DIAGNOSIS — E11.69 TYPE 2 DIABETES MELLITUS WITH OTHER SPECIFIED COMPLICATION, UNSPECIFIED WHETHER LONG TERM INSULIN USE (HCC): ICD-10-CM

## 2025-05-16 LAB
DME PARACHUTE DELIVERY DATE REQUESTED: NORMAL
DME PARACHUTE ITEM DESCRIPTION: NORMAL
DME PARACHUTE ORDER STATUS: NORMAL
DME PARACHUTE SUPPLIER NAME: NORMAL
DME PARACHUTE SUPPLIER PHONE: NORMAL
SL AMB POCT HEMOGLOBIN AIC: 10 (ref ?–6.5)

## 2025-05-16 PROCEDURE — 99215 OFFICE O/P EST HI 40 MIN: CPT | Performed by: FAMILY MEDICINE

## 2025-05-16 PROCEDURE — G0439 PPPS, SUBSEQ VISIT: HCPCS | Performed by: FAMILY MEDICINE

## 2025-05-16 PROCEDURE — 83036 HEMOGLOBIN GLYCOSYLATED A1C: CPT | Performed by: FAMILY MEDICINE

## 2025-05-16 PROCEDURE — G2211 COMPLEX E/M VISIT ADD ON: HCPCS | Performed by: FAMILY MEDICINE

## 2025-05-16 RX ORDER — MULTIVITAMIN
1 TABLET ORAL DAILY
Qty: 90 TABLET | Refills: 1 | Status: SHIPPED | OUTPATIENT
Start: 2025-05-16

## 2025-05-16 RX ORDER — INSULIN GLARGINE 100 [IU]/ML
10 INJECTION, SOLUTION SUBCUTANEOUS EVERY EVENING
Qty: 15 ML | Refills: 5 | Status: SHIPPED | OUTPATIENT
Start: 2025-05-16
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
## 2025-05-16 NOTE — ASSESSMENT & PLAN NOTE
Chronic tremors for over 20 years, but progressively increasing  Refer Neurology for further evaluation  Orders:  •  Ambulatory Referral to Neurology; Future  •  Multiple Vitamin (multivitamin) tablet; Take 1 tablet by mouth daily

## 2025-05-16 NOTE — ASSESSMENT & PLAN NOTE
Lab Results   Component Value Date    HGBA1C 10.0 (A) 05/16/2025    HGBA1C 8.7 (H) 01/29/2025    HGBA1C 7.2 (A) 09/17/2024     Increased Hemoglobin A1c, not at goal  Current medications:  Farxiga 10 mg daily, Trulicity 1.5 mg weekly  CGM: Freestyle Angella 3      Plan:  Start Lantus 10 units daily  Continue Farxiga    Stop Trulicity  2.   DM diet encouraged  3.   DM Eye exam pending  4.   DM foot exam- referred to Podiatry  Orders:  •  POCT hemoglobin A1c  •  Insulin Glargine Solostar (Lantus SoloStar) 100 UNIT/ML SOPN; Inject 0.1 mL (10 Units total) under the skin every evening  •  sitaGLIPtin (JANUVIA) 100 mg tablet; Take 1 tablet (100 mg total) by mouth daily

## 2025-05-19 ENCOUNTER — TELEPHONE (OUTPATIENT)
Dept: FAMILY MEDICINE CLINIC | Facility: CLINIC | Age: 70
End: 2025-05-19

## 2025-05-19 ENCOUNTER — PATIENT OUTREACH (OUTPATIENT)
Dept: FAMILY MEDICINE CLINIC | Facility: CLINIC | Age: 70
End: 2025-05-19

## 2025-05-19 DIAGNOSIS — E11.69 TYPE 2 DIABETES MELLITUS WITH OTHER SPECIFIED COMPLICATION, WITH LONG-TERM CURRENT USE OF INSULIN (HCC): Primary | ICD-10-CM

## 2025-05-19 DIAGNOSIS — G89.29 CHRONIC NEUROPATHIC PAIN: ICD-10-CM

## 2025-05-19 DIAGNOSIS — G47.33 OSA (OBSTRUCTIVE SLEEP APNEA): ICD-10-CM

## 2025-05-19 DIAGNOSIS — M79.2 CHRONIC NEUROPATHIC PAIN: ICD-10-CM

## 2025-05-19 DIAGNOSIS — Z79.4 TYPE 2 DIABETES MELLITUS WITH OTHER SPECIFIED COMPLICATION, WITH LONG-TERM CURRENT USE OF INSULIN (HCC): Primary | ICD-10-CM

## 2025-05-19 DIAGNOSIS — Z78.9 NEED FOR FOLLOW-UP BY SOCIAL WORKER: ICD-10-CM

## 2025-05-19 NOTE — TELEPHONE ENCOUNTER
IEB FORM RECEIVED ON 5/19/2025  GIVEN TO MURPHY MATOS TO BE COMPLETED, SIGNED BY MD/ (INCLUDE LISC. NUMBER), AND FAXED BACK IN 3 DAYS

## 2025-05-19 NOTE — TELEPHONE ENCOUNTER
----- Message from Reshma Jackson MD sent at 5/16/2025  4:04 PM EDT -----  Hi,Pt needs application for IEB started.Thanks,Dr. Jackson

## 2025-05-19 NOTE — PROGRESS NOTES
OP SW had received an in basket from Reshma Jackson MD on 5/15. Per in basket, patient needs waiver services. OP SW responded to pcp. OP SW placed referral for self. OP SW will continue to be available.

## 2025-05-21 ENCOUNTER — PATIENT OUTREACH (OUTPATIENT)
Dept: FAMILY MEDICINE CLINIC | Facility: CLINIC | Age: 70
End: 2025-05-21

## 2025-05-21 LAB
DME PARACHUTE DELIVERY DATE ACTUAL: NORMAL
DME PARACHUTE DELIVERY DATE REQUESTED: NORMAL
DME PARACHUTE ITEM DESCRIPTION: NORMAL
DME PARACHUTE ORDER STATUS: NORMAL
DME PARACHUTE SUPPLIER NAME: NORMAL
DME PARACHUTE SUPPLIER PHONE: NORMAL

## 2025-05-21 NOTE — PROGRESS NOTES
OP PEPE had contacted the patient via phone. OP PEPE left a voicemail. OP SW will attempt to call again at a later date and time. OP PEPE will continue to be available.

## 2025-05-22 ENCOUNTER — TELEPHONE (OUTPATIENT)
Dept: GASTROENTEROLOGY | Facility: MEDICAL CENTER | Age: 70
End: 2025-05-22

## 2025-05-22 NOTE — TELEPHONE ENCOUNTER
Pt reports he missed multiple calls from the GI office. Informed pt there are no chart indications that GI has reached out to him. Advised pt that his PCP's office has made attempts to contact him. Pt verbalized understanding and will contact his PCP.

## 2025-05-28 ENCOUNTER — PATIENT OUTREACH (OUTPATIENT)
Dept: FAMILY MEDICINE CLINIC | Facility: CLINIC | Age: 70
End: 2025-05-28

## 2025-05-28 NOTE — PROGRESS NOTES
OP PEPE had contacted the patient via phone. OP PEPE left a voicemail. OP PEPE notes this was the second phone call attempt. OP PEPE sent unable to reach letter via mail. OP PEPE closed referral. Please reconsult as needed.

## 2025-05-28 NOTE — LETTER
05/28/25    Dear Sherwin Ruiz,    I attempted to contact you by phone but was unable to reach you. I am the Outpatient Care Manager -  at [facility]. I am following up on a referral that was made by your PCP. Please give me a call at [phone].    Sincerely,         WALLY Franks

## 2025-05-29 ENCOUNTER — PATIENT OUTREACH (OUTPATIENT)
Dept: FAMILY MEDICINE CLINIC | Facility: CLINIC | Age: 70
End: 2025-05-29

## 2025-05-30 ENCOUNTER — PATIENT OUTREACH (OUTPATIENT)
Dept: FAMILY MEDICINE CLINIC | Facility: CLINIC | Age: 70
End: 2025-05-30

## 2025-05-30 DIAGNOSIS — E11.69 TYPE 2 DIABETES MELLITUS WITH OTHER SPECIFIED COMPLICATION, WITH LONG-TERM CURRENT USE OF INSULIN (HCC): Primary | ICD-10-CM

## 2025-05-30 DIAGNOSIS — Z79.4 TYPE 2 DIABETES MELLITUS WITH OTHER SPECIFIED COMPLICATION, WITH LONG-TERM CURRENT USE OF INSULIN (HCC): Primary | ICD-10-CM

## 2025-05-30 DIAGNOSIS — F17.200 NICOTINE USE DISORDER: ICD-10-CM

## 2025-05-30 PROCEDURE — G0511 CCM/BHI BY RHC/FQHC 20MIN MO: HCPCS | Performed by: FAMILY MEDICINE

## 2025-05-30 NOTE — PROGRESS NOTES
Chronic Care Management Program Consent:    Patient informed of availability of Chronic Care Management services. The services will billed monthly by their Primary Care Provider only. Patient is informed there may be a monthly cost sharing associated with the Chronic Care Management services. Patient is aware that financial counseling is available to assist with any co-pay questions or concerns.    Chronic Care Management services include:    24/7 access to care.  Comprehensive plan of care created by the provider.  Individualized care planning by the care manager(s).  Transitional care support.    The patient is informed that they have the right to stop Chronic Care Management services at anytime.       Patient consents to Chronic Care Management services? yes     Patient informed that consent is needed only once unless the patient switches qualifying providers.        I called the patient who is well known to me, introduced my role and explained the CCM program which he accepts.    The patient reported a blood sugar today of 88.  He states all readings have been <200.  I reviewed his DM meds and he states he is taking them as prescribed.  He noted his daughter is making sure he is taking them and sometimes will administer his insulin.  The patient states he is doing well with his diet and is checking his feet daily for skin breakdown. I provided encouragement to help reduce his A1C which is currently 10.  On my next outreach I will ask if the patient is interested in DM Education and to follow up again with Jonnathan.    The patient states he walks frequently but it is getting more difficult because of hip pain.  He notes he needs to rest often when walking. I reminded the patient to have hip x-rays obtained as PCP ordered.    The patient states he is having issues with his current pharmacy, [facility]. He states his daughter is helping him to obtain a new pharmacy.  He notes he has not received meds on time and he  stated Trulicity cost > $800.  Patient's benefits were reviewed and he does not have a secondary.  He is willing to have help from a CMOC to obtain a secondary insurance; referral placed.    I reminded the patient he is overdue for his annual eye exam. He states his daughter will help in scheduling an appointment.    The patient has my contact info and will call if he has questions or concerns.  I will follow up next month.

## 2025-06-04 ENCOUNTER — PATIENT OUTREACH (OUTPATIENT)
Dept: FAMILY MEDICINE CLINIC | Facility: CLINIC | Age: 70
End: 2025-06-04

## 2025-06-04 NOTE — PROGRESS NOTES
Outgoing Call  06/04/2025    CMOC called Sherwin Ruiz on this day regarding new referral received from RN to assist with Waiver Program application process.    Sherwin did not answer at this time. CMOC left voicemail to please call back at his convenience.    CMOC will continue to follow up.    Next outreach was scheduled on 06/10/2025.

## 2025-06-09 ENCOUNTER — PATIENT OUTREACH (OUTPATIENT)
Dept: FAMILY MEDICINE CLINIC | Facility: CLINIC | Age: 70
End: 2025-06-09

## 2025-06-09 NOTE — PROGRESS NOTES
Outgoing Call  06/09/2025    CMOC called Sherwin on this day regarding new referral received from RN to assist with Waiver Program application.    Sherwin's phone is not available to received calls at this time.    CMOC was unable to leave voicemail.    CMOC will continue to follow up.    Next outreach was scheduled on 06/13/2025.

## 2025-06-13 ENCOUNTER — PATIENT OUTREACH (OUTPATIENT)
Dept: FAMILY MEDICINE CLINIC | Facility: CLINIC | Age: 70
End: 2025-06-13

## 2025-06-13 DIAGNOSIS — Z79.4 TYPE 2 DIABETES MELLITUS WITH OTHER SPECIFIED COMPLICATION, WITH LONG-TERM CURRENT USE OF INSULIN (HCC): Primary | ICD-10-CM

## 2025-06-13 DIAGNOSIS — F17.200 NICOTINE USE DISORDER: ICD-10-CM

## 2025-06-13 DIAGNOSIS — E11.69 TYPE 2 DIABETES MELLITUS WITH OTHER SPECIFIED COMPLICATION, WITH LONG-TERM CURRENT USE OF INSULIN (HCC): Primary | ICD-10-CM

## 2025-06-13 NOTE — PROGRESS NOTES
Outgoing Call  06/13/2025    CMOC called Sherwin on this day regarding referral received from RN to assist with Waiver Program application.    Sherwin did not answer at this time. CMOC left voicemail to please call back.    Also CMOC sent contact letter.    CMOC will continue to follow up.    Next outreach was scheduled on 06/20/2025.

## 2025-06-13 NOTE — LETTER
June 13, 2025     Sherwin Joseph  [address]  [address]  [place] [address]      Dear Mr. Ruiz:    Please call KELTON Lewis ([phone]) as soon as possible so that we may reschedule your appointment. If you have already rescheduled your appointment, please disregard this letter.         Sincerely,        Farrah Lunsford        CC: No Recipients

## 2025-06-20 ENCOUNTER — PATIENT OUTREACH (OUTPATIENT)
Dept: FAMILY MEDICINE CLINIC | Facility: CLINIC | Age: 70
End: 2025-06-20

## 2025-06-20 DIAGNOSIS — E11.69 TYPE 2 DIABETES MELLITUS WITH OTHER SPECIFIED COMPLICATION, WITH LONG-TERM CURRENT USE OF INSULIN (HCC): Primary | ICD-10-CM

## 2025-06-20 DIAGNOSIS — Z79.4 TYPE 2 DIABETES MELLITUS WITH OTHER SPECIFIED COMPLICATION, WITH LONG-TERM CURRENT USE OF INSULIN (HCC): Primary | ICD-10-CM

## 2025-06-20 DIAGNOSIS — F17.200 NICOTINE USE DISORDER: ICD-10-CM

## 2025-06-20 NOTE — PROGRESS NOTES
Outgoing Call  06/20/2025    [facility] called Sherwin on this day regarding referral received from RN to assist with Waiver Program application    Sherwin phone is not receiving calls at this time.    [facility] was unable to leave voicemail.    Per Chart Review, CMOC had called few times and unable to contact Sherwin. Also [facility] sent contact letter on 06/13/2025. No answers or phone calls have been received yet.    [facility] will closed this referral today 06/20/2025 due unable to contact patient.    No follow up was scheduled at this time.

## 2025-06-24 ENCOUNTER — PATIENT OUTREACH (OUTPATIENT)
Dept: FAMILY MEDICINE CLINIC | Facility: CLINIC | Age: 70
End: 2025-06-24

## 2025-06-24 DIAGNOSIS — Z79.4 TYPE 2 DIABETES MELLITUS WITH OTHER SPECIFIED COMPLICATION, WITH LONG-TERM CURRENT USE OF INSULIN (HCC): Primary | ICD-10-CM

## 2025-06-24 DIAGNOSIS — F17.200 NICOTINE USE DISORDER: ICD-10-CM

## 2025-06-24 DIAGNOSIS — E11.69 TYPE 2 DIABETES MELLITUS WITH OTHER SPECIFIED COMPLICATION, WITH LONG-TERM CURRENT USE OF INSULIN (HCC): Primary | ICD-10-CM

## 2025-07-02 ENCOUNTER — PATIENT OUTREACH (OUTPATIENT)
Dept: FAMILY MEDICINE CLINIC | Facility: CLINIC | Age: 70
End: 2025-07-02

## 2025-07-02 DIAGNOSIS — F17.200 NICOTINE USE DISORDER: ICD-10-CM

## 2025-07-02 DIAGNOSIS — Z79.4 TYPE 2 DIABETES MELLITUS WITH OTHER SPECIFIED COMPLICATION, WITH LONG-TERM CURRENT USE OF INSULIN (HCC): Primary | ICD-10-CM

## 2025-07-02 DIAGNOSIS — E78.2 MIXED HYPERLIPIDEMIA: ICD-10-CM

## 2025-07-02 DIAGNOSIS — E11.69 TYPE 2 DIABETES MELLITUS WITH OTHER SPECIFIED COMPLICATION, WITH LONG-TERM CURRENT USE OF INSULIN (HCC): Primary | ICD-10-CM

## 2025-07-02 NOTE — PROGRESS NOTES
I called the patient but received his voicemail.  Message was left asking for a return call.      The patient returned my call.  He states he is not doing well because of hip pain.  He notes he cannot walk 2 blocks without stopping to rest.  The patient states he is taking OTC arthritis medication and Tylenol.  I encouraged the patient to obtain hip x-rays that his PCP ordered; he stated he will ask his daughter to take him.  He notes the pain is causing him to be more isolated.  The patient stated he was ordered PT but they do not provide transportation.      The patient reported his blood sugars have been elevated.  His notes the last 90d the average was 230.  He states he has been compliant with his diet.  I reviewed his DM meds and he states he is taking them as ordered. He states he is trying to get them under control.  I did not get to offer DM Education or scheduling with Jonnathan; will try on future outreach.      The patient has not yet obtained a secondary insurance. I informed him the [facility] has been unable to reach him.  He agreed to have her call him again; note sent to [facility].      The patient stated he has a new pharmacy, [facility], but is unsure which location.  He will find out from his daughter and let me know so I can add it to his chart.      I will continue to follow.      I received a call back from the patient.  He stated his new pharmacy is [facility] [address]; updated chart.    The patient also reported his tremors are worsening.  He requested I email him Neuro's phone number.    The patient stated if [facility] was trying to reach him, his phone most likely blocked the number as it does if the person is not on his contact list.  He asked that I email him her info so when she calls, he will know it is her calling.

## 2025-07-03 ENCOUNTER — PATIENT OUTREACH (OUTPATIENT)
Dept: FAMILY MEDICINE CLINIC | Facility: CLINIC | Age: 70
End: 2025-07-03

## 2025-07-03 DIAGNOSIS — Z79.4 TYPE 2 DIABETES MELLITUS WITH OTHER SPECIFIED COMPLICATION, WITH LONG-TERM CURRENT USE OF INSULIN (HCC): Primary | ICD-10-CM

## 2025-07-03 DIAGNOSIS — E11.69 TYPE 2 DIABETES MELLITUS WITH OTHER SPECIFIED COMPLICATION, WITH LONG-TERM CURRENT USE OF INSULIN (HCC): Primary | ICD-10-CM

## 2025-07-03 DIAGNOSIS — F17.200 NICOTINE USE DISORDER: ICD-10-CM

## 2025-07-03 NOTE — PROGRESS NOTES
Outgoing Call  07/03/2025    [facility] returned call to Sherwin Ruiz on this day.    Sherwin did not answer at this time. CMOC left voicemail to please call back.    Later on this day, [facility] received phone call from Sherwin.    CMOC introduced herself and her role. Sherwin agreed services and Home Visits.    CMOC explained Sherwin that [facility] will explained application process and complete referral for Home Care Services and Medical Assistance at the time of Home Visit. Sherwin expressed understanding.    [facility] scheduled Home Visit on 07/08/2025.    [facility] will continue to follow up.

## 2025-07-07 DIAGNOSIS — M79.2 CHRONIC NEUROPATHIC PAIN: ICD-10-CM

## 2025-07-07 DIAGNOSIS — G89.29 CHRONIC NEUROPATHIC PAIN: ICD-10-CM

## 2025-07-07 DIAGNOSIS — E78.2 MIXED HYPERLIPIDEMIA: ICD-10-CM

## 2025-07-07 DIAGNOSIS — E11.69 TYPE 2 DIABETES MELLITUS WITH OTHER SPECIFIED COMPLICATION, UNSPECIFIED WHETHER LONG TERM INSULIN USE (HCC): ICD-10-CM

## 2025-07-08 ENCOUNTER — PATIENT OUTREACH (OUTPATIENT)
Dept: FAMILY MEDICINE CLINIC | Facility: CLINIC | Age: 70
End: 2025-07-08

## 2025-07-08 DIAGNOSIS — F17.200 NICOTINE USE DISORDER: ICD-10-CM

## 2025-07-08 DIAGNOSIS — E11.69 TYPE 2 DIABETES MELLITUS WITH OTHER SPECIFIED COMPLICATION, WITHOUT LONG-TERM CURRENT USE OF INSULIN (HCC): Primary | ICD-10-CM

## 2025-07-08 RX ORDER — DULOXETIN HYDROCHLORIDE 30 MG/1
CAPSULE, DELAYED RELEASE ORAL
Qty: 60 CAPSULE | Refills: 5 | Status: SHIPPED | OUTPATIENT
Start: 2025-07-08

## 2025-07-08 RX ORDER — EZETIMIBE 10 MG/1
TABLET ORAL
Qty: 90 TABLET | Refills: 3 | Status: SHIPPED | OUTPATIENT
Start: 2025-07-08

## 2025-07-08 RX ORDER — PRAVASTATIN SODIUM 10 MG
TABLET ORAL
Qty: 90 TABLET | Refills: 3 | Status: SHIPPED | OUTPATIENT
Start: 2025-07-08

## 2025-07-09 ENCOUNTER — PATIENT OUTREACH (OUTPATIENT)
Dept: FAMILY MEDICINE CLINIC | Facility: CLINIC | Age: 70
End: 2025-07-09

## 2025-07-09 DIAGNOSIS — E11.69 TYPE 2 DIABETES MELLITUS WITH OTHER SPECIFIED COMPLICATION, WITH LONG-TERM CURRENT USE OF INSULIN (HCC): Primary | ICD-10-CM

## 2025-07-09 DIAGNOSIS — Z79.4 TYPE 2 DIABETES MELLITUS WITH OTHER SPECIFIED COMPLICATION, WITH LONG-TERM CURRENT USE OF INSULIN (HCC): Primary | ICD-10-CM

## 2025-07-09 DIAGNOSIS — F17.200 NICOTINE USE DISORDER: ICD-10-CM

## 2025-07-09 NOTE — PROGRESS NOTES
Outgoing Call  07/08/2025    [facility] called Sherwin to confirm Home Visit.    Sherwin did not answer at this time. CMOC left voicemail to please call back.    Later on this day, OC received phone call from Sherwin.    OC explained Sherwin that we can always place Waiver Program application over the phone. Sherwin agreed.    [facility] scheduled phone call for tomorrow 07/09/2025.    OC will continue to follow up.

## 2025-07-09 NOTE — PROGRESS NOTES
Outgoing Call  07/09/2025    CMOC called Sherwin on this day regarding Waiver Program application.    CMOC explained Sherwin application process. Sherwin expressed understanding.    CMOC called IEB. Jahaira from [facility] placed application. First in home interview was scheduled on 07/18/2025 between 9-11am with Anette.    Sherwin stated that he would like to switch [employer] Medicare Insurance for a better one. CMOC provided Jose's phone number and encouraged to make an appointment to discuss this matter. Sherwin will do.    Sherwin stated that he needs to get x rays done. CMOC did not find any orders for xrays. CMOC explained Sherwin that he should call the clinic for more information.    Sherwin stated that he need to make an appointment with podiatrist. CMOC also provided podiatrist phone number.    CMOC offered [employer] application for medical transportation. Sherwin stated that he does uses [employer]. Also Sherwin stated that he needs to change his [place] [facility]. CMOC encouraged Sherwin do it as soon is possible.    CMOC will continue to follow up.    Next outreach was scheduled on 07/21/2025.

## 2025-07-11 ENCOUNTER — TELEPHONE (OUTPATIENT)
Dept: FAMILY MEDICINE CLINIC | Facility: CLINIC | Age: 70
End: 2025-07-11

## 2025-07-11 NOTE — TELEPHONE ENCOUNTER
IEB FORM RECEIVED ON 7/11/2025    GIVEN TO MURPHY MATOS TO BE COMPLETED, SIGNED BY MD/ (INCLUDE LISC. NUMBER), AND FAXED BACK IN 3 DAYS

## 2025-07-11 NOTE — TELEPHONE ENCOUNTER
I have attempted to contact this patient by phone with the following results: left message to return my call on answering machine.    If pt contact office please assist in scheduling f/u appt for A1c. Thanks!

## 2025-07-14 NOTE — TELEPHONE ENCOUNTER
FORM COMPLETED, SIGNED BY MD/ (INCLUDE LISC. NUMBER) FAXED ON 07/14/25 TO ANTOINETTE at [phone]. FAX CONFIRMATION RECEIVED. FORM GIVEN TO MICHAEL TO SCAN

## 2025-07-28 ENCOUNTER — HOSPITAL ENCOUNTER (OUTPATIENT)
Dept: RADIOLOGY | Facility: HOSPITAL | Age: 70
Discharge: HOME | End: 2025-07-28
Payer: COMMERCIAL

## 2025-07-28 ENCOUNTER — PATIENT OUTREACH (OUTPATIENT)
Dept: FAMILY MEDICINE CLINIC | Facility: CLINIC | Age: 70
End: 2025-07-28

## 2025-07-28 DIAGNOSIS — M25.551 CHRONIC PAIN OF BOTH HIPS: ICD-10-CM

## 2025-07-28 DIAGNOSIS — G89.29 CHRONIC PAIN OF BOTH HIPS: ICD-10-CM

## 2025-07-28 DIAGNOSIS — Z79.4 TYPE 2 DIABETES MELLITUS WITH OTHER SPECIFIED COMPLICATION, WITH LONG-TERM CURRENT USE OF INSULIN (HCC): Primary | ICD-10-CM

## 2025-07-28 DIAGNOSIS — M25.552 CHRONIC PAIN OF BOTH HIPS: ICD-10-CM

## 2025-07-28 DIAGNOSIS — Z79.4 TYPE 2 DIABETES MELLITUS WITH OTHER SPECIFIED COMPLICATION, WITH LONG-TERM CURRENT USE OF INSULIN (HCC): ICD-10-CM

## 2025-07-28 DIAGNOSIS — F17.200 NICOTINE USE DISORDER: ICD-10-CM

## 2025-07-28 DIAGNOSIS — E11.69 TYPE 2 DIABETES MELLITUS WITH OTHER SPECIFIED COMPLICATION, WITH LONG-TERM CURRENT USE OF INSULIN (HCC): Primary | ICD-10-CM

## 2025-07-28 DIAGNOSIS — E78.2 MIXED HYPERLIPIDEMIA: ICD-10-CM

## 2025-07-28 DIAGNOSIS — E11.69 TYPE 2 DIABETES MELLITUS WITH OTHER SPECIFIED COMPLICATION, WITH LONG-TERM CURRENT USE OF INSULIN (HCC): ICD-10-CM

## 2025-07-28 DIAGNOSIS — R26.2 AMBULATORY DYSFUNCTION: ICD-10-CM

## 2025-07-28 PROCEDURE — 72110 X-RAY EXAM L-2 SPINE 4/>VWS: CPT

## 2025-07-28 PROCEDURE — 73521 X-RAY EXAM HIPS BI 2 VIEWS: CPT

## 2025-07-29 ENCOUNTER — TELEPHONE (OUTPATIENT)
Dept: FAMILY MEDICINE CLINIC | Facility: CLINIC | Age: 70
End: 2025-07-29

## 2025-07-29 RX ORDER — DAPAGLIFLOZIN 10 MG/1
10 TABLET, FILM COATED ORAL DAILY
Qty: 90 TABLET | Refills: 3 | Status: SHIPPED | OUTPATIENT
Start: 2025-07-29 | End: 2026-07-24

## 2025-08-04 ENCOUNTER — PATIENT OUTREACH (OUTPATIENT)
Dept: FAMILY MEDICINE CLINIC | Facility: CLINIC | Age: 70
End: 2025-08-04

## 2025-08-04 DIAGNOSIS — F17.200 NICOTINE USE DISORDER: ICD-10-CM

## 2025-08-04 DIAGNOSIS — E11.69 TYPE 2 DIABETES MELLITUS WITH OTHER SPECIFIED COMPLICATION, WITHOUT LONG-TERM CURRENT USE OF INSULIN (HCC): Primary | ICD-10-CM

## 2025-08-05 ENCOUNTER — PATIENT OUTREACH (OUTPATIENT)
Dept: FAMILY MEDICINE CLINIC | Facility: CLINIC | Age: 70
End: 2025-08-05

## 2025-08-05 ENCOUNTER — TELEPHONE (OUTPATIENT)
Dept: FAMILY MEDICINE CLINIC | Facility: CLINIC | Age: 70
End: 2025-08-05

## 2025-08-08 ENCOUNTER — PATIENT OUTREACH (OUTPATIENT)
Dept: FAMILY MEDICINE CLINIC | Facility: CLINIC | Age: 70
End: 2025-08-08

## 2025-08-08 DIAGNOSIS — F17.200 NICOTINE USE DISORDER: ICD-10-CM

## 2025-08-08 DIAGNOSIS — E11.69 TYPE 2 DIABETES MELLITUS WITH OTHER SPECIFIED COMPLICATION, WITHOUT LONG-TERM CURRENT USE OF INSULIN (HCC): Primary | ICD-10-CM

## 2025-08-12 ENCOUNTER — PATIENT OUTREACH (OUTPATIENT)
Dept: FAMILY MEDICINE CLINIC | Facility: CLINIC | Age: 70
End: 2025-08-12

## 2025-08-13 ENCOUNTER — PATIENT OUTREACH (OUTPATIENT)
Dept: FAMILY MEDICINE CLINIC | Facility: CLINIC | Age: 70
End: 2025-08-13

## 2025-08-22 ENCOUNTER — PATIENT OUTREACH (OUTPATIENT)
Dept: FAMILY MEDICINE CLINIC | Facility: CLINIC | Age: 70
End: 2025-08-22